# Patient Record
Sex: FEMALE | Race: WHITE | NOT HISPANIC OR LATINO | Employment: OTHER | URBAN - METROPOLITAN AREA
[De-identification: names, ages, dates, MRNs, and addresses within clinical notes are randomized per-mention and may not be internally consistent; named-entity substitution may affect disease eponyms.]

---

## 2017-02-03 ENCOUNTER — OFFICE VISIT (OUTPATIENT)
Dept: INTERNAL MEDICINE | Facility: CLINIC | Age: 82
End: 2017-02-03

## 2017-02-03 VITALS
BODY MASS INDEX: 26.5 KG/M2 | OXYGEN SATURATION: 98 % | HEIGHT: 63 IN | HEART RATE: 77 BPM | WEIGHT: 149.56 LBS | SYSTOLIC BLOOD PRESSURE: 122 MMHG | DIASTOLIC BLOOD PRESSURE: 70 MMHG | TEMPERATURE: 98.4 F

## 2017-02-03 DIAGNOSIS — R91.1 LUNG NODULE: Primary | ICD-10-CM

## 2017-02-03 DIAGNOSIS — J40 BRONCHITIS: ICD-10-CM

## 2017-02-03 DIAGNOSIS — Z00.00 MEDICARE ANNUAL WELLNESS VISIT, INITIAL: ICD-10-CM

## 2017-02-03 PROCEDURE — G0438 PPPS, INITIAL VISIT: HCPCS | Performed by: NURSE PRACTITIONER

## 2017-02-03 PROCEDURE — 99213 OFFICE O/P EST LOW 20 MIN: CPT | Performed by: NURSE PRACTITIONER

## 2017-02-03 RX ORDER — BENZONATATE 200 MG/1
200 CAPSULE ORAL 3 TIMES DAILY PRN
Qty: 30 CAPSULE | Refills: 0 | Status: SHIPPED | OUTPATIENT
Start: 2017-02-03 | End: 2017-04-05

## 2017-02-03 NOTE — PROGRESS NOTES
QUICK REFERENCE INFORMATION:  The ABCs of the Annual Wellness Visit    Initial Medicare Wellness Visit    HEALTH RISK ASSESSMENT    Recent Hospitalizations:  No recent hospitalization(s)..        Current Medical Providers:  Patient Care Team:  ED Mcgee as PCP - General (Family Medicine)  ED Mcgee as PCP - Internal Medicine (Internal Medicine)        Smoking Status:  History   Smoking Status   • Former Smoker   Smokeless Tobacco   • Never Used     Comment: years ago - too many years to remember       Alcohol Consumption:  History   Alcohol Use No       Depression Screen:   PHQ-9 Depression Screening 2/3/2017   Little interest or pleasure in doing things 0   Feeling down, depressed, or hopeless 2   Trouble falling or staying asleep, or sleeping too much 0   Feeling tired or having little energy 1   Poor appetite or overeating 0   Feeling bad about yourself - or that you are a failure or have let yourself or your family down 0   Trouble concentrating on things, such as reading the newspaper or watching television 0   Moving or speaking so slowly that other people could have noticed. Or the opposite - being so fidgety or restless that you have been moving around a lot more than usual 0   Thoughts that you would be better off dead, or of hurting yourself in some way 0   PHQ-9 Total Score 3   If you checked off any problems, how difficult have these problems made it for you to do your work, take care of things at home, or get along with other people? Somewhat difficult       Health Habits and Functional and Cognitive Screening:  Functional & Cognitive Status 2/3/2017   Do you have difficulty preparing food and eating? No   Do you have difficulty bathing yourself? No   Do you have difficulty getting dressed? No   Do you have difficulty using the toilet? No   Do you have difficulty moving around from place to place? No   In the past year have you fallen or experienced a near fall? No   Do you need help  using the phone?  No   Are you deaf or do you have serious difficulty hearing?  No   Do you need help with transportation? No   Do you need help shopping? No   Do you need help preparing meals?  No   Do you need help with housework?  No   Do you need help with laundry? No   Do you need help taking your medications? No   Do you need help managing money? No   Do you have difficulty concentrating, remembering or making decisions? No                  Does the patient have evidence of cognitive impairment? Yes    Asprin use counseling:yes    Finger Rub Hearing Test (right ear):passed  Finger Rub Hearing Test (left ear):passed    Recent Lab Results:    Visual Acuity:  No exam data present    Age-appropriate Screening Schedule:  Refer to the list below for future screening recommendations based on patient's age, sex and/or medical conditions. Orders for these recommended tests are listed in the plan section. The patient has been provided with a written plan.    Health Maintenance   Topic Date Due   • TDAP/TD VACCINES (1 - Tdap) 11/27/1954   • ZOSTER VACCINE  02/19/2016   • PNEUMOCOCCAL VACCINES (65+ LOW/MEDIUM RISK) (2 of 2 - PPSV23) 04/12/2017   • LIPID PANEL  10/14/2017   • MAMMOGRAM  12/19/2017   • COLONOSCOPY  03/16/2111   • INFLUENZA VACCINE  Completed        Subjective   History of Present Illness    Isabel Handy is a 81 y.o. female who presents for an Annual Wellness Visit. In addition, we addressed the following health issues:lung nodule, see attached note    The following portions of the patient's history were reviewed and updated as appropriate: allergies, current medications, past family history, past medical history, past social history, past surgical history and problem list.    Outpatient Medications Prior to Visit   Medication Sig Dispense Refill   • amitriptyline (ELAVIL) 25 MG tablet Take 1 tablet by mouth every night. 30 tablet 5   • aspirin 81 MG tablet Take 1 tablet by mouth daily.     •  atorvastatin (LIPITOR) 20 MG tablet Take 1 tablet by mouth Daily. 90 tablet 2   • estradiol (ESTRACE) 0.5 MG tablet Take 1/2 tablet daily 45 tablet 2   • levothyroxine (SYNTHROID, LEVOTHROID) 75 MCG tablet Take 1 tablet by mouth Daily. 90 tablet 2   • lisinopril (PRINIVIL,ZESTRIL) 10 MG tablet TAKE 1 TABLET BY MOUTH DAILY. 90 tablet 2   • meloxicam (MOBIC) 7.5 MG tablet Take 1 tablet by mouth daily as needed for mild pain (1-3). Take with food 30 tablet 3   • MULTIPLE VITAMINS PO Take 1 tablet by mouth daily.     • ondansetron (ZOFRAN) 4 MG tablet Take 4 mg by mouth Every 8 (Eight) Hours.     • Probiotic Product (SOLUBLE FIBER/PROBIOTICS) chewable tablet Chew 1 each Daily.     • promethazine (PHENERGAN) 25 MG tablet TAKE 1 TABLET BY MOUTH EVERY SIX HOURS AS NEEDED FOR 30 DAYS  5   • tobramycin-dexamethasone (TOBRADEX) ophthalmic ointment Apply 1 application to eye as needed.     • esomeprazole (NexIUM) 20 MG capsule Take 20 mg by mouth every morning before breakfast.     • clotrimazole (MYCELEX) 10 MG yonatan Take 1 tablet by mouth 5 (Five) Times a Day. 70 tablet 0   • oseltamivir (TAMIFLU) 75 MG capsule Take 1 capsule by mouth 2 (Two) Times a Day for 5 days. 10 capsule 0     No facility-administered medications prior to visit.        Patient Active Problem List   Diagnosis   • Gastroesophageal reflux disease   • Menopausal syndrome   • Osteoarthritis of cervical spine   • Carotid atherosclerosis   • Prediabetes   • Low back pain   • Insomnia   • Hypothyroidism   • Essential hypertension   • Hyperlipidemia   • Allergic rhinitis   • Palpitations   • Anxiety   • Vitamin D deficiency   • Lung nodule       Advanced Care Planning:  has NO advanced directive - information provided to the patient today    Identification of Risk Factors:  Risk factors include: cardiovascular risk.    Review of Systems    Compared to one year ago, the patient feels her physical health is the same.  Compared to one year ago, the patient feels  "her mental health is the same.    Objective     Physical Exam    Vitals:    02/03/17 1145   BP: 122/70   BP Location: Left arm   Patient Position: Sitting   Cuff Size: Adult   Pulse: 77   Temp: 98.4 °F (36.9 °C)   TempSrc: Oral   SpO2: 98%   Weight: 149 lb 9 oz (67.8 kg)   Height: 63\" (160 cm)       Body mass index is 26.49 kg/(m^2).  Discussed the patient's BMI with her. The BMI is above average; BMI management plan is completed.    Assessment/Plan   Patient Self-Management and Personalized Health Advice  The patient has been provided with information about: exercise and preventive services including:   · Advanced directives: has NO advanced directive - information provided to the patient today, Hepatitis B vaccine, TdaP vaccine, Zostavax vaccine (Herpes Zoster).  Prevnar 13- will get today    Visit Diagnoses:    ICD-10-CM ICD-9-CM   1. Lung nodule R91.1 793.11   2. Bronchitis J40 490       Orders Placed This Encounter   Procedures   • CT Chest With Contrast     Standing Status:   Future     Standing Expiration Date:   2/3/2018     Scheduling Instructions:      [facility]     Order Specific Question:   Reason for Exam:     Answer:   lung nodule   • Basic Metabolic Panel       Outpatient Encounter Prescriptions as of 2/3/2017   Medication Sig Dispense Refill   • amitriptyline (ELAVIL) 25 MG tablet Take 1 tablet by mouth every night. 30 tablet 5   • aspirin 81 MG tablet Take 1 tablet by mouth daily.     • atorvastatin (LIPITOR) 20 MG tablet Take 1 tablet by mouth Daily. 90 tablet 2   • estradiol (ESTRACE) 0.5 MG tablet Take 1/2 tablet daily 45 tablet 2   • levothyroxine (SYNTHROID, LEVOTHROID) 75 MCG tablet Take 1 tablet by mouth Daily. 90 tablet 2   • lisinopril (PRINIVIL,ZESTRIL) 10 MG tablet TAKE 1 TABLET BY MOUTH DAILY. 90 tablet 2   • meloxicam (MOBIC) 7.5 MG tablet Take 1 tablet by mouth daily as needed for mild pain (1-3). Take with food 30 tablet 3   • MULTIPLE VITAMINS PO Take 1 tablet by mouth daily.     • " ondansetron (ZOFRAN) 4 MG tablet Take 4 mg by mouth Every 8 (Eight) Hours.     • Probiotic Product (SOLUBLE FIBER/PROBIOTICS) chewable tablet Chew 1 each Daily.     • promethazine (PHENERGAN) 25 MG tablet TAKE 1 TABLET BY MOUTH EVERY SIX HOURS AS NEEDED FOR 30 DAYS  5   • tobramycin-dexamethasone (TOBRADEX) ophthalmic ointment Apply 1 application to eye as needed.     • benzonatate (TESSALON) 200 MG capsule Take 1 capsule by mouth 3 (Three) Times a Day As Needed for cough. 30 capsule 0   • esomeprazole (NexIUM) 20 MG capsule Take 20 mg by mouth every morning before breakfast.     • [DISCONTINUED] clotrimazole (MYCELEX) 10 MG yonatan Take 1 tablet by mouth 5 (Five) Times a Day. 70 tablet 0   • [DISCONTINUED] oseltamivir (TAMIFLU) 75 MG capsule Take 1 capsule by mouth 2 (Two) Times a Day for 5 days. 10 capsule 0     No facility-administered encounter medications on file as of 2/3/2017.        Reviewed use of high risk medication in the elderly: no  Reviewed for potential of harmful drug interactions in the elderly: no    Follow Up:  No Follow-up on file.     An After Visit Summary and PPPS with all of these plans were given to the patient.

## 2017-02-04 LAB
BUN SERPL-MCNC: 12 MG/DL (ref 8–23)
BUN/CREAT SERPL: 16 (ref 7–25)
CALCIUM SERPL-MCNC: 9.8 MG/DL (ref 8.6–10.5)
CHLORIDE SERPL-SCNC: 100 MMOL/L (ref 98–107)
CO2 SERPL-SCNC: 27.2 MMOL/L (ref 22–29)
CREAT SERPL-MCNC: 0.75 MG/DL (ref 0.57–1)
GLUCOSE SERPL-MCNC: 95 MG/DL (ref 65–99)
POTASSIUM SERPL-SCNC: 5.1 MMOL/L (ref 3.5–5.2)
SODIUM SERPL-SCNC: 139 MMOL/L (ref 136–145)

## 2017-02-06 ENCOUNTER — TELEPHONE (OUTPATIENT)
Dept: INTERNAL MEDICINE | Facility: CLINIC | Age: 82
End: 2017-02-06

## 2017-02-06 NOTE — TELEPHONE ENCOUNTER
----- Message from ED Mcgee sent at 2/6/2017  9:16 AM EST -----  Kidney function WNL, ok for CT scan  ----- Message -----     From: Interface, Reflab Results In     Sent: 2/4/2017   3:21 AM       To: ED Mcgee

## 2017-02-08 NOTE — PATIENT INSTRUCTIONS
Medicare Wellness  Personal Prevention Plan of Service     Date of Office Visit:  2017  Encounter Provider:  ED Morris  Place of Service:  [facility] INTERNAL MEDICINE  Patient Name: Isabel Handy  :  1935    As part of the Medicare Wellness portion of your visit today, we are providing you with this personalized preventive plan of services (PPPS). This plan is based upon recommendations of the [place] Preventive Services Task Force (USPSTF) and the Advisory Committee on Immunization Practices (ACIP).    This lists the preventive care services that should be considered, and provides dates of when you are due. Items listed as completed are up-to-date and do not require any further intervention.    Health Maintenance   Topic Date Due   • TDAP/TD VACCINES (1 - Tdap) 1956   • ZOSTER VACCINE  2016   • LIPID PANEL  10/14/2017   • MAMMOGRAM  2017   • MEDICARE ANNUAL WELLNESS  2018   • DXA SCAN  2018   • COLONOSCOPY  2019   • INFLUENZA VACCINE  Completed   • PNEUMOCOCCAL VACCINES (65+ LOW/MEDIUM RISK)  Completed         Isabel was seen today for annual exam and uri.    Diagnoses and all orders for this visit:    Lung nodule  -     CT Chest With Contrast; Future  -     Basic Metabolic Panel    Bronchitis  -     benzonatate (TESSALON) 200 MG capsule; Take 1 capsule by mouth 3 (Three) Times a Day As Needed for cough.        1.  Lung nodule-order CT chest with contrast, check BMP today  2.  Bronchitis-lungs clear, probably resolving bronchitis.  Recommend Mucinex OTC and will give Rx for Tessalon Perles.

## 2017-02-09 ENCOUNTER — HOSPITAL ENCOUNTER (OUTPATIENT)
Dept: CT IMAGING | Facility: HOSPITAL | Age: 82
Discharge: HOME | End: 2017-02-09
Admitting: NURSE PRACTITIONER

## 2017-02-09 DIAGNOSIS — R91.1 LUNG NODULE: ICD-10-CM

## 2017-02-09 LAB — CREAT BLDA-MCNC: 0.8 MG/DL (ref 0.6–1.3)

## 2017-02-09 PROCEDURE — 0 IOPAMIDOL 61 % SOLUTION: Performed by: PHYSICIAN ASSISTANT

## 2017-02-09 PROCEDURE — 71260 CT THORAX DX C+: CPT

## 2017-02-09 PROCEDURE — 82565 ASSAY OF CREATININE: CPT

## 2017-02-09 RX ADMIN — IOPAMIDOL 75 ML: 612 INJECTION, SOLUTION INTRAVENOUS at 15:20

## 2017-02-13 ENCOUNTER — TELEPHONE (OUTPATIENT)
Dept: INTERNAL MEDICINE | Facility: CLINIC | Age: 82
End: 2017-02-13

## 2017-02-13 DIAGNOSIS — D73.89 SPLENIC LESION: Primary | ICD-10-CM

## 2017-02-13 NOTE — TELEPHONE ENCOUNTER
----- Message from ED Mcgee sent at 2/13/2017 12:53 PM EST -----  Set pt up for MRI of abd w&w/o contrast for splenic lesion at [facility]  ----- Message -----     From: Chely, Rad Results [place] In     Sent: 2/13/2017  10:25 AM       To: ED Mcgee

## 2017-02-14 ENCOUNTER — TELEPHONE (OUTPATIENT)
Dept: INTERNAL MEDICINE | Facility: CLINIC | Age: 82
End: 2017-02-14

## 2017-02-14 RX ORDER — DIAZEPAM 2 MG/1
2 TABLET ORAL 2 TIMES DAILY PRN
Qty: 2 TABLET | Refills: 0 | OUTPATIENT
Start: 2017-02-14 | End: 2017-04-05

## 2017-02-14 NOTE — TELEPHONE ENCOUNTER
----- Message from ED Mcgee sent at 2/14/2017  3:18 PM EST -----  Regarding: FW: Patient Call  Contact: [phone]  Patient is nervous about MRI.  Okay to give Valium 2 mg by mouth ×1, 1hr prior to MRI.  Quantity #2.  No refills.  Patient needs to have someone that can drive her to and from the test.  ----- Message -----     From: Jannette Kamara     Sent: 2/14/2017   2:53 PM       To: ED Mcgee  Subject: FW: Patient Call                                 Do you know anything about the message to call 2 days prior to MRI?      ----- Message -----     From: Virginie Lindsay     Sent: 2/14/2017   2:35 PM       To: Jannette Kamara  Subject: Patient Call                                     Patient said she was told to call here two days prior to her MRI, which is scheduled for Thursday.

## 2017-02-14 NOTE — TELEPHONE ENCOUNTER
Patient is aware that Valium has been called in and she will need a  to & from MRI. Patient has rescheduled MRI for this coming Sunday.

## 2017-02-16 ENCOUNTER — APPOINTMENT (OUTPATIENT)
Dept: MRI IMAGING | Facility: HOSPITAL | Age: 82
End: 2017-02-16

## 2017-02-19 ENCOUNTER — HOSPITAL ENCOUNTER (OUTPATIENT)
Dept: MRI IMAGING | Facility: HOSPITAL | Age: 82
Discharge: HOME | End: 2017-02-19
Admitting: NURSE PRACTITIONER

## 2017-02-19 PROCEDURE — A9577 INJ MULTIHANCE: HCPCS | Performed by: NURSE PRACTITIONER

## 2017-02-19 PROCEDURE — 0 GADOBENATE DIMEGLUMINE 529 MG/ML SOLUTION: Performed by: NURSE PRACTITIONER

## 2017-02-19 PROCEDURE — 74183 MRI ABD W/O CNTR FLWD CNTR: CPT

## 2017-02-19 PROCEDURE — 82565 ASSAY OF CREATININE: CPT

## 2017-02-19 RX ADMIN — GADOBENATE DIMEGLUMINE 14 ML: 529 INJECTION, SOLUTION INTRAVENOUS at 09:27

## 2017-02-20 LAB — CREAT BLDA-MCNC: 0.7 MG/DL (ref 0.6–1.3)

## 2017-02-22 ENCOUNTER — TELEPHONE (OUTPATIENT)
Dept: INTERNAL MEDICINE | Facility: CLINIC | Age: 82
End: 2017-02-22

## 2017-02-22 NOTE — TELEPHONE ENCOUNTER
----- Message from Reshma Ivory sent at 2/21/2017  3:50 PM EST -----  Regarding: test result questions  Contact: [phone]  She wants someone to call her regarding the cyst that she has.

## 2017-02-22 NOTE — TELEPHONE ENCOUNTER
I CALLED PT. AND SHE STATES THAT SHE WANTS TO KNOW MORE ABOUT HER LUNG NODULE,  WHAT CAUSED HER CYST ON HER SPLEEN AND WHAT DOES SHE NEED TO DO ABOUT IT, IF SHE SHOULD BE CONCERNED ABOUT THESE RESULTS OF THE CT AND MRI OR NOT?  DOES SHE NEED A FOLLOW UP SOONER THAN April?  I SPOKE TO PT. IN DETAIL AND READ THE RESULTS TO HER EXPLAINING THE CT SHOWED CALCIFICATIONS (2) AND OTHERWISE UNREMARKABLE AND THE MRI WITH THE CYST THAT WAS ALSO UNREMARKABLE. PT. ASKED FOR A COPY TO BE MAILED OF EACH TEST RESULT.   PT. STATES THAT SHE FEELS BETTER NOW AFTER TALKING WITH ME ABOUT THE RESULTS AND JUST WANTED ZAHEER TO DOUBLE CHECK THE RESULTS AND TELL HER IF SHE NEEDS TO BE WORRIED ABOUT ANYTHING ELSE.

## 2017-02-24 NOTE — TELEPHONE ENCOUNTER
Both areas appear benign, she does not need follow up any sooner than April. No further work up needed.

## 2017-03-31 DIAGNOSIS — E55.9 VITAMIN D DEFICIENCY: ICD-10-CM

## 2017-03-31 DIAGNOSIS — M47.812 OSTEOARTHRITIS OF CERVICAL SPINE, UNSPECIFIED SPINAL OSTEOARTHRITIS COMPLICATION STATUS: ICD-10-CM

## 2017-03-31 DIAGNOSIS — R73.03 PREDIABETES: Primary | ICD-10-CM

## 2017-03-31 DIAGNOSIS — R73.9 HYPERGLYCEMIA: ICD-10-CM

## 2017-03-31 DIAGNOSIS — I10 ESSENTIAL HYPERTENSION: ICD-10-CM

## 2017-03-31 DIAGNOSIS — E03.9 HYPOTHYROIDISM, UNSPECIFIED TYPE: ICD-10-CM

## 2017-03-31 DIAGNOSIS — E78.5 HYPERLIPIDEMIA, UNSPECIFIED HYPERLIPIDEMIA TYPE: ICD-10-CM

## 2017-04-03 LAB
25(OH)D3+25(OH)D2 SERPL-MCNC: 39 NG/ML (ref 30–100)
ALBUMIN SERPL-MCNC: 4.4 G/DL (ref 3.5–5.2)
ALBUMIN/GLOB SERPL: 1.8 G/DL
ALP SERPL-CCNC: 75 U/L (ref 39–117)
ALT SERPL-CCNC: 16 U/L (ref 1–33)
AST SERPL-CCNC: 19 U/L (ref 1–32)
BASOPHILS # BLD AUTO: 0.03 10*3/MM3 (ref 0–0.2)
BASOPHILS NFR BLD AUTO: 0.4 % (ref 0–1.5)
BILIRUB SERPL-MCNC: 0.5 MG/DL (ref 0.1–1.2)
BUN SERPL-MCNC: 15 MG/DL (ref 8–23)
BUN/CREAT SERPL: 18.8 (ref 7–25)
CALCIUM SERPL-MCNC: 10.1 MG/DL (ref 8.6–10.5)
CHLORIDE SERPL-SCNC: 99 MMOL/L (ref 98–107)
CHOLEST SERPL-MCNC: 181 MG/DL (ref 0–200)
CO2 SERPL-SCNC: 28.6 MMOL/L (ref 22–29)
CREAT SERPL-MCNC: 0.8 MG/DL (ref 0.57–1)
EOSINOPHIL # BLD AUTO: 0.24 10*3/MM3 (ref 0–0.7)
EOSINOPHIL NFR BLD AUTO: 2.9 % (ref 0.3–6.2)
ERYTHROCYTE [DISTWIDTH] IN BLOOD BY AUTOMATED COUNT: 14.3 % (ref 11.7–13)
GLOBULIN SER CALC-MCNC: 2.5 GM/DL
GLUCOSE SERPL-MCNC: 100 MG/DL (ref 65–99)
HBA1C MFR BLD: 5.24 % (ref 4.8–5.6)
HCT VFR BLD AUTO: 48.3 % (ref 35.6–45.5)
HDLC SERPL-MCNC: 97 MG/DL (ref 40–60)
HGB BLD-MCNC: 15 G/DL (ref 11.9–15.5)
IMM GRANULOCYTES # BLD: 0 10*3/MM3 (ref 0–0.03)
IMM GRANULOCYTES NFR BLD: 0 % (ref 0–0.5)
LDLC SERPL CALC-MCNC: 68 MG/DL (ref 0–100)
LDLC/HDLC SERPL: 0.7 {RATIO}
LYMPHOCYTES # BLD AUTO: 3.05 10*3/MM3 (ref 0.9–4.8)
LYMPHOCYTES NFR BLD AUTO: 36.7 % (ref 19.6–45.3)
MCH RBC QN AUTO: 29.7 PG (ref 26.9–32)
MCHC RBC AUTO-ENTMCNC: 31.1 G/DL (ref 32.4–36.3)
MCV RBC AUTO: 95.6 FL (ref 80.5–98.2)
MONOCYTES # BLD AUTO: 0.61 10*3/MM3 (ref 0.2–1.2)
MONOCYTES NFR BLD AUTO: 7.3 % (ref 5–12)
NEUTROPHILS # BLD AUTO: 4.37 10*3/MM3 (ref 1.9–8.1)
NEUTROPHILS NFR BLD AUTO: 52.7 % (ref 42.7–76)
PLATELET # BLD AUTO: 288 10*3/MM3 (ref 140–500)
POTASSIUM SERPL-SCNC: 4.9 MMOL/L (ref 3.5–5.2)
PROT SERPL-MCNC: 6.9 G/DL (ref 6–8.5)
RBC # BLD AUTO: 5.05 10*6/MM3 (ref 3.9–5.2)
SODIUM SERPL-SCNC: 139 MMOL/L (ref 136–145)
TRIGL SERPL-MCNC: 79 MG/DL (ref 0–150)
TSH SERPL DL<=0.005 MIU/L-ACNC: 1.31 MIU/ML (ref 0.27–4.2)
VLDLC SERPL CALC-MCNC: 15.8 MG/DL (ref 5–40)
WBC # BLD AUTO: 8.3 10*3/MM3 (ref 4.5–10.7)

## 2017-04-04 PROBLEM — R73.9 HYPERGLYCEMIA: Status: ACTIVE | Noted: 2017-04-04

## 2017-04-05 ENCOUNTER — OFFICE VISIT (OUTPATIENT)
Dept: INTERNAL MEDICINE | Facility: CLINIC | Age: 82
End: 2017-04-05

## 2017-04-05 VITALS
HEART RATE: 81 BPM | WEIGHT: 152.5 LBS | BODY MASS INDEX: 27.02 KG/M2 | HEIGHT: 63 IN | SYSTOLIC BLOOD PRESSURE: 120 MMHG | OXYGEN SATURATION: 98 % | DIASTOLIC BLOOD PRESSURE: 68 MMHG

## 2017-04-05 DIAGNOSIS — I65.29 CAROTID ATHEROSCLEROSIS, UNSPECIFIED LATERALITY: ICD-10-CM

## 2017-04-05 DIAGNOSIS — E78.5 HYPERLIPIDEMIA, UNSPECIFIED HYPERLIPIDEMIA TYPE: Primary | ICD-10-CM

## 2017-04-05 DIAGNOSIS — I10 ESSENTIAL HYPERTENSION: ICD-10-CM

## 2017-04-05 DIAGNOSIS — E03.9 HYPOTHYROIDISM, UNSPECIFIED TYPE: ICD-10-CM

## 2017-04-05 PROCEDURE — 99214 OFFICE O/P EST MOD 30 MIN: CPT | Performed by: NURSE PRACTITIONER

## 2017-04-05 NOTE — PROGRESS NOTES
Subjective   Isabel Handy is a 81 y.o. female.     History of Present Illness   The patient is here today to F/U on lab work. Feeling well.     HTN- BP at home running syst 100-120s, no hypotensive symptoms.   HPL- At last visit Lipitor increased to 20 mg daily.  Hypothyroidism- Pt is doing well with current medication regimen, denies adverse reactions, compliant with medication schedule.     Moving from [place] farm to an apartment in [place].   The following portions of the patient's history were reviewed and updated as appropriate: allergies, current medications, past family history, past medical history, past social history, past surgical history and problem list.    Review of Systems   Constitutional: Negative.    Respiratory: Negative.    Cardiovascular: Negative.    Psychiatric/Behavioral: Negative.        Objective   Physical Exam   Constitutional: She appears well-developed and well-nourished.   Neck: Normal range of motion. Neck supple. No thyromegaly present.   Cardiovascular: Normal rate, regular rhythm, normal heart sounds and intact distal pulses.    Pulmonary/Chest: Effort normal and breath sounds normal.   Skin: Skin is warm and dry.   Psychiatric: She has a normal mood and affect. Her behavior is normal. Judgment and thought content normal.       Assessment/Plan   There are no diagnoses linked to this encounter.    1. HPL- continue lipitor 20 mg daily  2. Hypothryoidism- continue levo at  3. HTN- pt will monitor her BP and call for syst <110.  3. Carotid atherosclerosis- monitored per cardiology, to see back 5/2018    Zostavax- recommended

## 2017-04-11 ENCOUNTER — TELEPHONE (OUTPATIENT)
Dept: INTERNAL MEDICINE | Facility: CLINIC | Age: 82
End: 2017-04-11

## 2017-04-11 RX ORDER — NITROFURANTOIN 25; 75 MG/1; MG/1
100 CAPSULE ORAL 2 TIMES DAILY
Qty: 10 CAPSULE | Refills: 0 | Status: SHIPPED | OUTPATIENT
Start: 2017-04-11 | End: 2017-04-16

## 2017-04-11 NOTE — TELEPHONE ENCOUNTER
----- Message from ED Mcgee sent at 4/11/2017 12:34 PM EDT -----  Regarding: FW: Patient Call  Contact: [phone]  Strongly recommend she leave us a urine sample. Will give nitrofurantoin  mg PO BID X 5 days.   ----- Message -----     From: Perlita Mathew     Sent: 4/11/2017   9:14 AM       To: ED Mcgee  Subject: FW: Patient Call                                     ----- Message -----     From: Ne Treviño     Sent: 4/11/2017   8:14 AM       To: Perlita Mathew  Subject: Patient Call                                     Patient asked if Beth could call something in for a UTI.  I told her typically we would have a patient come in to be seen by the provider and run a urine specimen, but she stated she is moving and doesn't have time for all that.  I told her I would send a message back and ask.  She can either be reached at the above number, or her cell at   [phone].

## 2017-04-11 NOTE — TELEPHONE ENCOUNTER
I LEFT MESSAGE ON PT.'S VM EXPLAINING WE WOULD LIKE TO HAVE HER COME IN AND LEAVE A SAMPLE OF HER URINE AND THAT WE ARE SENDING IN A SCRIPT FOR MACROBID AND TO CALL US IF HER S/S DO NOT STOP AFTER SHE TAKES IT.

## 2017-04-11 NOTE — TELEPHONE ENCOUNTER
----- Message from Ne Treviño sent at 4/11/2017  8:14 AM EDT -----  Regarding: Patient Call  Contact: [phone]  Patient asked if Beth could call something in for a UTI.  I told her typically we would have a patient come in to be seen by the provider and run a urine specimen, but she stated she is moving and doesn't have time for all that.  I told her I would send a message back and ask.  She can either be reached at the above number, or her cell at   [phone].

## 2017-07-18 DIAGNOSIS — E78.5 HYPERLIPIDEMIA, UNSPECIFIED HYPERLIPIDEMIA TYPE: ICD-10-CM

## 2017-07-18 RX ORDER — ATORVASTATIN CALCIUM 20 MG/1
TABLET, FILM COATED ORAL
Qty: 90 TABLET | Refills: 2 | Status: SHIPPED | OUTPATIENT
Start: 2017-07-18 | End: 2017-10-19 | Stop reason: SDUPTHER

## 2017-08-23 ENCOUNTER — OFFICE (OUTPATIENT)
Dept: URBAN - METROPOLITAN AREA OTHER 6 | Facility: OTHER | Age: 82
End: 2017-08-23

## 2017-08-23 VITALS
HEIGHT: 63 IN | HEART RATE: 88 BPM | WEIGHT: 152 LBS | DIASTOLIC BLOOD PRESSURE: 70 MMHG | SYSTOLIC BLOOD PRESSURE: 122 MMHG

## 2017-08-23 DIAGNOSIS — K59.00 CONSTIPATION, UNSPECIFIED: ICD-10-CM

## 2017-08-23 DIAGNOSIS — K21.9 GASTRO-ESOPHAGEAL REFLUX DISEASE WITHOUT ESOPHAGITIS: ICD-10-CM

## 2017-08-23 PROCEDURE — 99212 OFFICE O/P EST SF 10 MIN: CPT | Performed by: INTERNAL MEDICINE

## 2017-10-05 ENCOUNTER — RESULTS ENCOUNTER (OUTPATIENT)
Dept: INTERNAL MEDICINE | Facility: CLINIC | Age: 82
End: 2017-10-05

## 2017-10-05 DIAGNOSIS — I10 ESSENTIAL HYPERTENSION: ICD-10-CM

## 2017-10-05 DIAGNOSIS — E03.9 HYPOTHYROIDISM, UNSPECIFIED TYPE: ICD-10-CM

## 2017-10-05 DIAGNOSIS — E78.5 HYPERLIPIDEMIA, UNSPECIFIED HYPERLIPIDEMIA TYPE: ICD-10-CM

## 2017-10-05 DIAGNOSIS — I65.29 CAROTID ATHEROSCLEROSIS, UNSPECIFIED LATERALITY: ICD-10-CM

## 2017-10-05 LAB
ALBUMIN SERPL-MCNC: 4.3 G/DL (ref 3.5–5.2)
ALBUMIN/GLOB SERPL: 1.9 G/DL
ALP SERPL-CCNC: 73 U/L (ref 39–117)
ALT SERPL-CCNC: 13 U/L (ref 1–33)
AST SERPL-CCNC: 16 U/L (ref 1–32)
BILIRUB SERPL-MCNC: 0.7 MG/DL (ref 0.1–1.2)
BUN SERPL-MCNC: 15 MG/DL (ref 8–23)
BUN/CREAT SERPL: 20.3 (ref 7–25)
CALCIUM SERPL-MCNC: 9.7 MG/DL (ref 8.6–10.5)
CHLORIDE SERPL-SCNC: 100 MMOL/L (ref 98–107)
CHOLEST SERPL-MCNC: 178 MG/DL (ref 0–200)
CO2 SERPL-SCNC: 22.6 MMOL/L (ref 22–29)
CREAT SERPL-MCNC: 0.74 MG/DL (ref 0.57–1)
GLOBULIN SER CALC-MCNC: 2.3 GM/DL
GLUCOSE SERPL-MCNC: 100 MG/DL (ref 65–99)
HDLC SERPL-MCNC: 95 MG/DL (ref 40–60)
LDLC SERPL CALC-MCNC: 67 MG/DL (ref 0–100)
LDLC/HDLC SERPL: 0.71 {RATIO}
POTASSIUM SERPL-SCNC: 4.8 MMOL/L (ref 3.5–5.2)
PROT SERPL-MCNC: 6.6 G/DL (ref 6–8.5)
SODIUM SERPL-SCNC: 138 MMOL/L (ref 136–145)
TRIGL SERPL-MCNC: 79 MG/DL (ref 0–150)
TSH SERPL DL<=0.005 MIU/L-ACNC: 0.28 MIU/ML (ref 0.27–4.2)
VLDLC SERPL CALC-MCNC: 15.8 MG/DL (ref 5–40)

## 2017-10-19 ENCOUNTER — OFFICE VISIT (OUTPATIENT)
Dept: INTERNAL MEDICINE | Facility: CLINIC | Age: 82
End: 2017-10-19

## 2017-10-19 VITALS
BODY MASS INDEX: 27.46 KG/M2 | HEART RATE: 77 BPM | DIASTOLIC BLOOD PRESSURE: 76 MMHG | HEIGHT: 63 IN | SYSTOLIC BLOOD PRESSURE: 134 MMHG | OXYGEN SATURATION: 98 % | WEIGHT: 155 LBS

## 2017-10-19 DIAGNOSIS — R11.0 NAUSEA: ICD-10-CM

## 2017-10-19 DIAGNOSIS — M47.812 OSTEOARTHRITIS OF CERVICAL SPINE, UNSPECIFIED SPINAL OSTEOARTHRITIS COMPLICATION STATUS: ICD-10-CM

## 2017-10-19 DIAGNOSIS — E03.9 HYPOTHYROIDISM, UNSPECIFIED TYPE: Primary | ICD-10-CM

## 2017-10-19 DIAGNOSIS — K21.9 GASTROESOPHAGEAL REFLUX DISEASE, ESOPHAGITIS PRESENCE NOT SPECIFIED: ICD-10-CM

## 2017-10-19 DIAGNOSIS — G47.00 INSOMNIA, UNSPECIFIED TYPE: ICD-10-CM

## 2017-10-19 DIAGNOSIS — N95.1 MENOPAUSAL SYNDROME: ICD-10-CM

## 2017-10-19 DIAGNOSIS — E78.5 HYPERLIPIDEMIA, UNSPECIFIED HYPERLIPIDEMIA TYPE: ICD-10-CM

## 2017-10-19 DIAGNOSIS — Z23 NEED FOR INFLUENZA VACCINATION: ICD-10-CM

## 2017-10-19 DIAGNOSIS — I10 ESSENTIAL HYPERTENSION: ICD-10-CM

## 2017-10-19 PROCEDURE — G0008 ADMIN INFLUENZA VIRUS VAC: HCPCS | Performed by: NURSE PRACTITIONER

## 2017-10-19 PROCEDURE — 99214 OFFICE O/P EST MOD 30 MIN: CPT | Performed by: NURSE PRACTITIONER

## 2017-10-19 RX ORDER — ESTRADIOL 0.5 MG/1
TABLET ORAL
Qty: 45 TABLET | Refills: 2 | Status: SHIPPED | OUTPATIENT
Start: 2017-10-19 | End: 2018-04-10 | Stop reason: SDUPTHER

## 2017-10-19 RX ORDER — ATORVASTATIN CALCIUM 20 MG/1
20 TABLET, FILM COATED ORAL NIGHTLY
Qty: 90 TABLET | Refills: 2 | Status: SHIPPED | OUTPATIENT
Start: 2017-10-19 | End: 2018-07-26 | Stop reason: SDUPTHER

## 2017-10-19 RX ORDER — LISINOPRIL 10 MG/1
10 TABLET ORAL DAILY
Qty: 90 TABLET | Refills: 2 | Status: SHIPPED | OUTPATIENT
Start: 2017-10-19 | End: 2018-09-12 | Stop reason: SDUPTHER

## 2017-10-19 RX ORDER — LEVOTHYROXINE SODIUM 0.07 MG/1
75 TABLET ORAL DAILY
Qty: 90 TABLET | Refills: 2 | Status: SHIPPED | OUTPATIENT
Start: 2017-10-19 | End: 2019-03-11

## 2017-10-19 RX ORDER — MELOXICAM 7.5 MG/1
7.5 TABLET ORAL DAILY PRN
Qty: 30 TABLET | Refills: 3 | Status: SHIPPED | OUTPATIENT
Start: 2017-10-19 | End: 2018-09-12 | Stop reason: SDUPTHER

## 2017-10-19 RX ORDER — AMITRIPTYLINE HYDROCHLORIDE 25 MG/1
25 TABLET, FILM COATED ORAL NIGHTLY
Qty: 90 TABLET | Refills: 2 | Status: SHIPPED | OUTPATIENT
Start: 2017-10-19 | End: 2018-10-25 | Stop reason: SDUPTHER

## 2017-10-19 RX ORDER — PROMETHAZINE HYDROCHLORIDE 25 MG/1
25 TABLET ORAL DAILY PRN
Qty: 30 TABLET | Refills: 3 | Status: SHIPPED | OUTPATIENT
Start: 2017-10-19 | End: 2018-04-22

## 2017-10-19 NOTE — PROGRESS NOTES
Subjective   Isabel Handy is a 81 y.o. female here for a follow up on hyperlipidemia.    History of Present Illness   The patient is here today to F/U on lab work. She is feeling well. Just had cataracts removed. Is doing well with her new apartment.   GERD- sees Dr. Be, he give her phenergan as needed for nausea.   Insomnia-Pt is doing well with current medication regimen, denies adverse reactions, compliant with medication schedule.   Osteoarthritis- needs refill on mobic, uses PRN  HPL-Pt is doing well with current medication regimen, denies adverse reactions, compliant with medication schedule.   HTN-Pt is doing well with current medication regimen, denies adverse reactions, compliant with medication schedule.   Hypothyroidism-Pt is doing well with current medication regimen, denies adverse reactions, compliant with medication schedule.       Carotid atherosclerosis- monitored per cardiology, to see back 5/2018  The following portions of the patient's history were reviewed and updated as appropriate: allergies, current medications, past family history, past medical history, past social history, past surgical history and problem list.    Review of Systems   Constitutional: Negative.    Respiratory: Negative.    Cardiovascular: Negative.    Psychiatric/Behavioral: Negative.        Objective   Physical Exam   Constitutional: She appears well-developed and well-nourished.   Neck: Normal range of motion. Neck supple. No thyromegaly present.   Cardiovascular: Normal rate, regular rhythm, normal heart sounds and intact distal pulses.    Right ankle with mild edema, left ankle trace edema   Pulmonary/Chest: Effort normal and breath sounds normal.   Skin: Skin is warm and dry.   Psychiatric: She has a normal mood and affect. Her behavior is normal. Judgment and thought content normal.     Vitals:    10/19/17 1031   BP: 134/76   Pulse: 77   SpO2: 98%       Current Outpatient Prescriptions:   •  amitriptyline  (ELAVIL) 25 MG tablet, Take 1 tablet by mouth every night., Disp: 30 tablet, Rfl: 5  •  aspirin 81 MG tablet, Take 1 tablet by mouth daily., Disp: , Rfl:   •  atorvastatin (LIPITOR) 20 MG tablet, TAKE 1 TABLET BY MOUTH DAILY., Disp: 90 tablet, Rfl: 2  •  estradiol (ESTRACE) 0.5 MG tablet, Take 1/2 tablet daily, Disp: 45 tablet, Rfl: 2  •  levothyroxine (SYNTHROID, LEVOTHROID) 75 MCG tablet, Take 1 tablet by mouth Daily., Disp: 90 tablet, Rfl: 2  •  lisinopril (PRINIVIL,ZESTRIL) 10 MG tablet, TAKE 1 TABLET BY MOUTH DAILY., Disp: 90 tablet, Rfl: 2  •  MULTIPLE VITAMINS PO, Take 1 tablet by mouth daily., Disp: , Rfl:   •  Probiotic Product (SOLUBLE FIBER/PROBIOTICS) chewable tablet, Chew 1 each Daily., Disp: , Rfl:   •  tobramycin-dexamethasone (TOBRADEX) ophthalmic ointment, Apply 1 application to eye as needed., Disp: , Rfl:   •  esomeprazole (NexIUM) 20 MG capsule, Take 20 mg by mouth every morning before breakfast., Disp: , Rfl:   •  meloxicam (MOBIC) 7.5 MG tablet, Take 1 tablet by mouth daily as needed for mild pain (1-3). Take with food, Disp: 30 tablet, Rfl: 3  •  promethazine (PHENERGAN) 25 MG tablet, TAKE 1 TABLET BY MOUTH EVERY SIX HOURS AS NEEDED FOR 30 DAYS, Disp: , Rfl: 5  This SmartLink has not been configured with any valid records.     Results Encounter on 10/05/2017   Component Date Value Ref Range Status   • Glucose 10/05/2017 100* 65 - 99 mg/dL Final   • BUN 10/05/2017 15  8 - 23 mg/dL Final   • Creatinine 10/05/2017 0.74  0.57 - 1.00 mg/dL Final   • eGFR Non  Am 10/05/2017 76  >60 mL/min/1.73 Final    Comment: The MDRD GFR formula is only valid for adults with stable  renal function between ages 18 and 70.     • eGFR  Am 10/05/2017 92  >60 mL/min/1.73 Final   • BUN/Creatinine Ratio 10/05/2017 20.3  7.0 - 25.0 Final   • Sodium 10/05/2017 138  136 - 145 mmol/L Final   • Potassium 10/05/2017 4.8  3.5 - 5.2 mmol/L Final   • Chloride 10/05/2017 100  98 - 107 mmol/L Final   • Total CO2  10/05/2017 22.6  22.0 - 29.0 mmol/L Final   • Calcium 10/05/2017 9.7  8.6 - 10.5 mg/dL Final   • Total Protein 10/05/2017 6.6  6.0 - 8.5 g/dL Final   • Albumin 10/05/2017 4.30  3.50 - 5.20 g/dL Final   • Globulin 10/05/2017 2.3  gm/dL Final   • A/G Ratio 10/05/2017 1.9  g/dL Final   • Total Bilirubin 10/05/2017 0.7  0.1 - 1.2 mg/dL Final   • Alkaline Phosphatase 10/05/2017 73  39 - 117 U/L Final   • AST (SGOT) 10/05/2017 16  1 - 32 U/L Final   • ALT (SGPT) 10/05/2017 13  1 - 33 U/L Final   • Total Cholesterol 10/05/2017 178  0 - 200 mg/dL Final   • Triglycerides 10/05/2017 79  0 - 150 mg/dL Final   • HDL Cholesterol 10/05/2017 95* 40 - 60 mg/dL Final   • VLDL Cholesterol 10/05/2017 15.8  5 - 40 mg/dL Final   • LDL Cholesterol  10/05/2017 67  0 - 100 mg/dL Final   • LDL/HDL Ratio 10/05/2017 0.71   Final   • TSH 10/05/2017 0.281  0.27 - 4.2 mIU/mL Final       Assessment/Plan   There are no diagnoses linked to this encounter.    1. Hypothyroidism- TSH a little low, recheck in 6 weeks if stll this low will decrease dose.   2. HPL- doing well with lipitor 20 mg daily  3. HTN- well controlled  4. GERD- will refill phenergan (she will not take and drive), zofran does not help, to see Dr. Be next year, due for C-scope next year  5. Insomnia- refill elavil  6. Osteoarthritis- refill mobic, take with food  7. Menopausal symptoms- pt is doing well with estrace, aware of risks of HRT at her age, she prefers to continue this.     Flu vaccine- 10/19/2017  Mammo in December.

## 2017-11-21 DIAGNOSIS — E03.9 HYPOTHYROIDISM, UNSPECIFIED TYPE: ICD-10-CM

## 2017-11-29 LAB — TSH SERPL DL<=0.005 MIU/L-ACNC: 0.35 MIU/ML (ref 0.27–4.2)

## 2017-12-07 DIAGNOSIS — E03.9 HYPOTHYROIDISM, UNSPECIFIED TYPE: ICD-10-CM

## 2017-12-07 RX ORDER — LEVOTHYROXINE SODIUM 0.07 MG/1
TABLET ORAL
Qty: 90 TABLET | Refills: 0 | Status: SHIPPED | OUTPATIENT
Start: 2017-12-07 | End: 2018-03-05 | Stop reason: SDUPTHER

## 2018-03-05 DIAGNOSIS — E03.9 HYPOTHYROIDISM, UNSPECIFIED TYPE: ICD-10-CM

## 2018-03-05 RX ORDER — LEVOTHYROXINE SODIUM 0.07 MG/1
TABLET ORAL
Qty: 90 TABLET | Refills: 0 | Status: SHIPPED | OUTPATIENT
Start: 2018-03-05 | End: 2018-04-05 | Stop reason: SDUPTHER

## 2018-04-10 DIAGNOSIS — N95.1 MENOPAUSAL SYNDROME: ICD-10-CM

## 2018-04-10 RX ORDER — ESTRADIOL 0.5 MG/1
TABLET ORAL
Qty: 45 TABLET | Refills: 2 | Status: SHIPPED | OUTPATIENT
Start: 2018-04-10 | End: 2018-09-05

## 2018-04-16 DIAGNOSIS — E03.4 HYPOTHYROIDISM DUE TO ACQUIRED ATROPHY OF THYROID: Primary | ICD-10-CM

## 2018-04-16 DIAGNOSIS — E03.9 HYPOTHYROIDISM, UNSPECIFIED TYPE: ICD-10-CM

## 2018-04-16 DIAGNOSIS — E78.5 HYPERLIPIDEMIA, UNSPECIFIED HYPERLIPIDEMIA TYPE: ICD-10-CM

## 2018-04-16 DIAGNOSIS — I10 ESSENTIAL HYPERTENSION: ICD-10-CM

## 2018-04-16 LAB
ALBUMIN SERPL-MCNC: 4.5 G/DL (ref 3.5–5.2)
ALBUMIN/GLOB SERPL: 1.7 G/DL
ALP SERPL-CCNC: 88 U/L (ref 39–117)
ALT SERPL-CCNC: 17 U/L (ref 1–33)
AST SERPL-CCNC: 19 U/L (ref 1–32)
BASOPHILS # BLD AUTO: 0.06 10*3/MM3 (ref 0–0.2)
BASOPHILS NFR BLD AUTO: 0.6 % (ref 0–1.5)
BILIRUB SERPL-MCNC: 0.5 MG/DL (ref 0.1–1.2)
BUN SERPL-MCNC: 14 MG/DL (ref 8–23)
BUN/CREAT SERPL: 17.1 (ref 7–25)
CALCIUM SERPL-MCNC: 9.4 MG/DL (ref 8.6–10.5)
CHLORIDE SERPL-SCNC: 101 MMOL/L (ref 98–107)
CHOLEST SERPL-MCNC: 181 MG/DL (ref 0–200)
CO2 SERPL-SCNC: 25.3 MMOL/L (ref 22–29)
CREAT SERPL-MCNC: 0.82 MG/DL (ref 0.57–1)
EOSINOPHIL # BLD AUTO: 0.46 10*3/MM3 (ref 0–0.7)
EOSINOPHIL NFR BLD AUTO: 4.4 % (ref 0.3–6.2)
ERYTHROCYTE [DISTWIDTH] IN BLOOD BY AUTOMATED COUNT: 14.2 % (ref 11.7–13)
GFR SERPLBLD CREATININE-BSD FMLA CKD-EPI: 67 ML/MIN/1.73
GFR SERPLBLD CREATININE-BSD FMLA CKD-EPI: 81 ML/MIN/1.73
GLOBULIN SER CALC-MCNC: 2.6 GM/DL
GLUCOSE SERPL-MCNC: 86 MG/DL (ref 65–99)
HCT VFR BLD AUTO: 49.9 % (ref 35.6–45.5)
HDLC SERPL-MCNC: 90 MG/DL (ref 40–60)
HGB BLD-MCNC: 15.7 G/DL (ref 11.9–15.5)
IMM GRANULOCYTES # BLD: 0.05 10*3/MM3 (ref 0–0.03)
IMM GRANULOCYTES NFR BLD: 0.5 % (ref 0–0.5)
LDLC SERPL CALC-MCNC: 78 MG/DL (ref 0–100)
LDLC/HDLC SERPL: 0.86 {RATIO}
LYMPHOCYTES # BLD AUTO: 3.45 10*3/MM3 (ref 0.9–4.8)
LYMPHOCYTES NFR BLD AUTO: 33.1 % (ref 19.6–45.3)
MCH RBC QN AUTO: 29.7 PG (ref 26.9–32)
MCHC RBC AUTO-ENTMCNC: 31.5 G/DL (ref 32.4–36.3)
MCV RBC AUTO: 94.5 FL (ref 80.5–98.2)
MONOCYTES # BLD AUTO: 0.77 10*3/MM3 (ref 0.2–1.2)
MONOCYTES NFR BLD AUTO: 7.4 % (ref 5–12)
NEUTROPHILS # BLD AUTO: 5.64 10*3/MM3 (ref 1.9–8.1)
NEUTROPHILS NFR BLD AUTO: 54 % (ref 42.7–76)
PLATELET # BLD AUTO: 297 10*3/MM3 (ref 140–500)
POTASSIUM SERPL-SCNC: 4.7 MMOL/L (ref 3.5–5.2)
PROT SERPL-MCNC: 7.1 G/DL (ref 6–8.5)
RBC # BLD AUTO: 5.28 10*6/MM3 (ref 3.9–5.2)
SODIUM SERPL-SCNC: 141 MMOL/L (ref 136–145)
TRIGL SERPL-MCNC: 66 MG/DL (ref 0–150)
TSH SERPL DL<=0.005 MIU/L-ACNC: 2.45 MIU/ML (ref 0.27–4.2)
VLDLC SERPL CALC-MCNC: 13.2 MG/DL (ref 5–40)
WBC # BLD AUTO: 10.43 10*3/MM3 (ref 4.5–10.7)

## 2018-04-18 ENCOUNTER — OFFICE VISIT (OUTPATIENT)
Dept: INTERNAL MEDICINE | Facility: CLINIC | Age: 83
End: 2018-04-18

## 2018-04-18 VITALS
DIASTOLIC BLOOD PRESSURE: 68 MMHG | SYSTOLIC BLOOD PRESSURE: 132 MMHG | OXYGEN SATURATION: 96 % | WEIGHT: 164 LBS | HEIGHT: 63 IN | HEART RATE: 73 BPM | BODY MASS INDEX: 29.06 KG/M2

## 2018-04-18 DIAGNOSIS — I10 ESSENTIAL HYPERTENSION: ICD-10-CM

## 2018-04-18 DIAGNOSIS — G47.00 INSOMNIA, UNSPECIFIED TYPE: Primary | ICD-10-CM

## 2018-04-18 DIAGNOSIS — E03.9 HYPOTHYROIDISM, UNSPECIFIED TYPE: ICD-10-CM

## 2018-04-18 DIAGNOSIS — N95.1 MENOPAUSAL SYNDROME: ICD-10-CM

## 2018-04-18 DIAGNOSIS — D58.2 ELEVATED HEMOGLOBIN (HCC): ICD-10-CM

## 2018-04-18 DIAGNOSIS — E78.5 HYPERLIPIDEMIA, UNSPECIFIED HYPERLIPIDEMIA TYPE: ICD-10-CM

## 2018-04-18 PROCEDURE — 99214 OFFICE O/P EST MOD 30 MIN: CPT | Performed by: NURSE PRACTITIONER

## 2018-04-18 NOTE — PROGRESS NOTES
Subjective   Isabel Handy is a 82 y.o. female here for a follow up on hyperlipidemia.    History of Present Illness   The patient is here today to F/U on lab work. Feeling well except with wt gain. She feels since she moved in to the city she has been less active.     Recent URI last week, better now, took keflex.     HTN-Pt is doing well with current medication regimen, denies adverse reactions, compliant with medication schedule.   Hypothyroidism- Pt is doing well with current medication regimen, denies adverse reactions, compliant with medication schedule. Taking 1/2 tab on weekends and whole tablet during the week.   GERD- taking nexium PRN  HPL- Pt is doing well with current medication regimen, denies adverse reactions, compliant with medication schedule.     The following portions of the patient's history were reviewed and updated as appropriate: allergies, current medications, past family history, past medical history, past social history, past surgical history and problem list.    Review of Systems   Constitutional: Negative.    Respiratory: Negative.    Cardiovascular: Negative.    Psychiatric/Behavioral: Positive for sleep disturbance. Negative for dysphoric mood. The patient is not nervous/anxious.        Objective   Physical Exam   Constitutional: She appears well-developed and well-nourished.   Neck: Normal range of motion. Neck supple. No thyromegaly present.   Cardiovascular: Normal rate, regular rhythm, normal heart sounds and intact distal pulses.    Pulmonary/Chest: Effort normal and breath sounds normal.   Skin: Skin is warm and dry.   Psychiatric: She has a normal mood and affect. Her behavior is normal. Judgment and thought content normal.       Assessment/Plan   There are no diagnoses linked to this encounter.    1. Insomnia- ok to take elavil at 25 mg daily  2. Menopausal symptoms- pt is doing well with estrace, aware of risks of HRT at her age, she prefers to continue this.   3. Elevated  hemoglobin- recheck in 1 month  4. HPL- continue same  5. Hypothyroidism- continue current dosing.   6. HTN- doing well, continue same

## 2018-05-18 ENCOUNTER — RESULTS ENCOUNTER (OUTPATIENT)
Dept: INTERNAL MEDICINE | Facility: CLINIC | Age: 83
End: 2018-05-18

## 2018-05-18 DIAGNOSIS — D58.2 ELEVATED HEMOGLOBIN (HCC): ICD-10-CM

## 2018-05-18 LAB
BASOPHILS # BLD AUTO: 0 X10E3/UL (ref 0–0.2)
BASOPHILS NFR BLD AUTO: 1 %
EOSINOPHIL # BLD AUTO: 0.4 X10E3/UL (ref 0–0.4)
EOSINOPHIL NFR BLD AUTO: 4 %
ERYTHROCYTE [DISTWIDTH] IN BLOOD BY AUTOMATED COUNT: 13.9 % (ref 12.3–15.4)
HCT VFR BLD AUTO: 46.6 % (ref 34–46.6)
HGB BLD-MCNC: 14.9 G/DL (ref 11.1–15.9)
IMM GRANULOCYTES # BLD: 0 X10E3/UL (ref 0–0.1)
IMM GRANULOCYTES NFR BLD: 0 %
LYMPHOCYTES # BLD AUTO: 3.6 X10E3/UL (ref 0.7–3.1)
LYMPHOCYTES NFR BLD AUTO: 41 %
MCH RBC QN AUTO: 29.2 PG (ref 26.6–33)
MCHC RBC AUTO-ENTMCNC: 32 G/DL (ref 31.5–35.7)
MCV RBC AUTO: 91 FL (ref 79–97)
MONOCYTES # BLD AUTO: 0.6 X10E3/UL (ref 0.1–0.9)
MONOCYTES NFR BLD AUTO: 7 %
NEUTROPHILS # BLD AUTO: 4.2 X10E3/UL (ref 1.4–7)
NEUTROPHILS NFR BLD AUTO: 47 %
PLATELET # BLD AUTO: 299 X10E3/UL (ref 150–379)
RBC # BLD AUTO: 5.11 X10E6/UL (ref 3.77–5.28)
WBC # BLD AUTO: 8.8 X10E3/UL (ref 3.4–10.8)

## 2018-06-04 ENCOUNTER — OFFICE VISIT (OUTPATIENT)
Dept: INTERNAL MEDICINE | Facility: CLINIC | Age: 83
End: 2018-06-04

## 2018-06-04 ENCOUNTER — HOSPITAL ENCOUNTER (OUTPATIENT)
Dept: GENERAL RADIOLOGY | Facility: HOSPITAL | Age: 83
Discharge: HOME | End: 2018-06-04
Admitting: NURSE PRACTITIONER

## 2018-06-04 VITALS
OXYGEN SATURATION: 96 % | HEART RATE: 79 BPM | TEMPERATURE: 98.4 F | BODY MASS INDEX: 29 KG/M2 | WEIGHT: 163.7 LBS | SYSTOLIC BLOOD PRESSURE: 120 MMHG | HEIGHT: 63 IN | DIASTOLIC BLOOD PRESSURE: 70 MMHG

## 2018-06-04 DIAGNOSIS — J18.9 PNEUMONIA OF RIGHT MIDDLE LOBE DUE TO INFECTIOUS ORGANISM: Primary | ICD-10-CM

## 2018-06-04 PROCEDURE — 71046 X-RAY EXAM CHEST 2 VIEWS: CPT

## 2018-06-04 PROCEDURE — 99213 OFFICE O/P EST LOW 20 MIN: CPT | Performed by: NURSE PRACTITIONER

## 2018-06-04 RX ORDER — CEFIXIME 400 MG/1
400 CAPSULE ORAL DAILY
COMMUNITY
End: 2018-06-11

## 2018-06-04 RX ORDER — DOXYCYCLINE HYCLATE 100 MG/1
100 TABLET, DELAYED RELEASE ORAL 2 TIMES DAILY
Qty: 14 TABLET | Refills: 0 | Status: SHIPPED | OUTPATIENT
Start: 2018-06-04 | End: 2018-06-11

## 2018-06-04 RX ORDER — ALBUTEROL SULFATE 90 UG/1
2 AEROSOL, METERED RESPIRATORY (INHALATION) EVERY 6 HOURS PRN
Refills: 0 | COMMUNITY
Start: 2018-06-01 | End: 2018-09-05

## 2018-06-04 NOTE — PROGRESS NOTES
Subjective   Isabel Handy is a 82 y.o. female here for cough and chest congestion. Patient went to urgent care and was diagnosed with pneumonia.    History of Present Illness   The patient is here today with complaints of cough and chest congestion.    Was seen at urgent care May 24 for sore throat, diagnosis acute pharyngitis, given Rx for amoxicillin, prednisone and cough syrup. Was then seen at [facility] [place] 6/1st with complaints of cough and chest congestion, diagnosis community-acquired pneumonia of right middle lobe of lung. Amoxil was discontinued and suprax was started, she is taking one daily for 10 days.   She is coughing up grey sputum. She was given an inhaler but doesn't feel a lot of relief. Yesterday 100.8. She did get a steroid injection on 6/1st.   She is taking probiotic daily.   The following portions of the patient's history were reviewed and updated as appropriate: allergies, current medications, past family history, past medical history, past social history, past surgical history and problem list.    Review of Systems   Constitutional: Positive for fever.   HENT: Negative.    Respiratory: Positive for cough, shortness of breath (intermittently) and wheezing.    Cardiovascular: Negative.        Objective   Physical Exam   Constitutional: She appears well-developed and well-nourished.   Neck: Normal range of motion. Neck supple. No thyromegaly present.   Cardiovascular: Normal rate, regular rhythm, normal heart sounds and intact distal pulses.    Pulmonary/Chest: Effort normal. She has wheezes in the left upper field, the left middle field and the left lower field. She has rhonchi in the right upper field, the right middle field and the right lower field. She has rales in the right upper field, the right middle field and the right lower field.   Skin: Skin is warm and dry.   Psychiatric: She has a normal mood and affect. Her behavior is normal. Judgment and thought content normal.       Assessment/Plan    There are no diagnoses linked to this encounter.    1. Pneumonia- continue the mucinex albuterol inhaler. Change from suprax to doxycyline X7 days. Hydrate well. If symptoms worsen go to the ER. Return in 1 week for recheck. Needs CXR in 6 weeks. Rest, vit C. Continue probiotic, notify for any diarrhea.

## 2018-06-11 ENCOUNTER — OFFICE VISIT (OUTPATIENT)
Dept: INTERNAL MEDICINE | Facility: CLINIC | Age: 83
End: 2018-06-11

## 2018-06-11 VITALS
TEMPERATURE: 97.9 F | HEART RATE: 74 BPM | SYSTOLIC BLOOD PRESSURE: 126 MMHG | BODY MASS INDEX: 28.92 KG/M2 | OXYGEN SATURATION: 97 % | WEIGHT: 163.2 LBS | HEIGHT: 63 IN | DIASTOLIC BLOOD PRESSURE: 72 MMHG

## 2018-06-11 DIAGNOSIS — J40 BRONCHITIS: Primary | ICD-10-CM

## 2018-06-11 PROCEDURE — 99213 OFFICE O/P EST LOW 20 MIN: CPT | Performed by: NURSE PRACTITIONER

## 2018-06-11 NOTE — PROGRESS NOTES
Subjective   Isabel Handy is a 82 y.o. female here for a follow up on pneumonia and cough.    History of Present Illness   The patient is here today to F/U on pneumonia. [place] [facility] [place] dx with RML pneumonia. CXR actually just shows a calcified granuloma, no pneumonia. Pt was treated with suprax and doxcycyline.     She does report that she is feeling better. Productive cough is now clear, no fevers or chills.   The following portions of the patient's history were reviewed and updated as appropriate: allergies, current medications, past family history, past medical history, past social history, past surgical history and problem list.    Review of Systems   Constitutional: Negative.    Respiratory: Positive for cough. Negative for shortness of breath and wheezing.    Cardiovascular: Negative.    Psychiatric/Behavioral: Negative.        Objective   Physical Exam   Constitutional: She appears well-developed and well-nourished.   Neck: Normal range of motion. Neck supple. No thyromegaly present.   Cardiovascular: Normal rate, regular rhythm, normal heart sounds and intact distal pulses.    Pulmonary/Chest: Effort normal and breath sounds normal.   Skin: Skin is warm and dry.   Psychiatric: She has a normal mood and affect. Her behavior is normal. Judgment and thought content normal.       Assessment/Plan   There are no diagnoses linked to this encounter.    1. Bronchitis- resolving, continue same

## 2018-06-28 DIAGNOSIS — E03.9 HYPOTHYROIDISM, UNSPECIFIED TYPE: ICD-10-CM

## 2018-06-28 RX ORDER — LEVOTHYROXINE SODIUM 0.07 MG/1
TABLET ORAL
Qty: 90 TABLET | Refills: 0 | Status: SHIPPED | OUTPATIENT
Start: 2018-06-28 | End: 2018-08-24

## 2018-07-26 DIAGNOSIS — E78.5 HYPERLIPIDEMIA, UNSPECIFIED HYPERLIPIDEMIA TYPE: ICD-10-CM

## 2018-07-26 RX ORDER — ATORVASTATIN CALCIUM 20 MG/1
20 TABLET, FILM COATED ORAL NIGHTLY
Qty: 90 TABLET | Refills: 0 | Status: SHIPPED | OUTPATIENT
Start: 2018-07-26 | End: 2018-09-05

## 2018-08-22 ENCOUNTER — OFFICE (OUTPATIENT)
Dept: URBAN - METROPOLITAN AREA CLINIC 66 | Facility: CLINIC | Age: 83
End: 2018-08-22

## 2018-08-22 VITALS
HEIGHT: 63 IN | WEIGHT: 165 LBS | SYSTOLIC BLOOD PRESSURE: 120 MMHG | HEART RATE: 88 BPM | DIASTOLIC BLOOD PRESSURE: 84 MMHG

## 2018-08-22 DIAGNOSIS — Z80.9 FAMILY HISTORY OF MALIGNANT NEOPLASM, UNSPECIFIED: ICD-10-CM

## 2018-08-22 DIAGNOSIS — R13.10 DYSPHAGIA, UNSPECIFIED: ICD-10-CM

## 2018-08-22 DIAGNOSIS — K21.9 GASTRO-ESOPHAGEAL REFLUX DISEASE WITHOUT ESOPHAGITIS: ICD-10-CM

## 2018-08-22 PROCEDURE — 99213 OFFICE O/P EST LOW 20 MIN: CPT | Performed by: INTERNAL MEDICINE

## 2018-08-24 ENCOUNTER — HOSPITAL ENCOUNTER (EMERGENCY)
Facility: HOSPITAL | Age: 83
Discharge: HOME | End: 2018-08-24
Attending: EMERGENCY MEDICINE | Admitting: EMERGENCY MEDICINE

## 2018-08-24 ENCOUNTER — APPOINTMENT (OUTPATIENT)
Dept: GENERAL RADIOLOGY | Facility: HOSPITAL | Age: 83
End: 2018-08-24

## 2018-08-24 VITALS
BODY MASS INDEX: 28.35 KG/M2 | RESPIRATION RATE: 18 BRPM | HEIGHT: 63 IN | DIASTOLIC BLOOD PRESSURE: 74 MMHG | OXYGEN SATURATION: 99 % | TEMPERATURE: 98.3 F | SYSTOLIC BLOOD PRESSURE: 129 MMHG | WEIGHT: 160 LBS | HEART RATE: 66 BPM

## 2018-08-24 DIAGNOSIS — R55 NEAR SYNCOPE: ICD-10-CM

## 2018-08-24 DIAGNOSIS — R42 LIGHTHEADEDNESS: Primary | ICD-10-CM

## 2018-08-24 PROCEDURE — 71046 X-RAY EXAM CHEST 2 VIEWS: CPT

## 2018-08-24 PROCEDURE — 84484 ASSAY OF TROPONIN QUANT: CPT | Performed by: PHYSICIAN ASSISTANT

## 2018-08-24 PROCEDURE — 83880 ASSAY OF NATRIURETIC PEPTIDE: CPT | Performed by: PHYSICIAN ASSISTANT

## 2018-08-24 PROCEDURE — 85379 FIBRIN DEGRADATION QUANT: CPT | Performed by: EMERGENCY MEDICINE

## 2018-08-24 PROCEDURE — 36415 COLL VENOUS BLD VENIPUNCTURE: CPT

## 2018-08-24 PROCEDURE — 80053 COMPREHEN METABOLIC PANEL: CPT | Performed by: PHYSICIAN ASSISTANT

## 2018-08-24 PROCEDURE — 99284 EMERGENCY DEPT VISIT MOD MDM: CPT

## 2018-08-24 PROCEDURE — 93010 ELECTROCARDIOGRAM REPORT: CPT | Performed by: INTERNAL MEDICINE

## 2018-08-24 PROCEDURE — 93005 ELECTROCARDIOGRAM TRACING: CPT | Performed by: EMERGENCY MEDICINE

## 2018-08-24 PROCEDURE — 93005 ELECTROCARDIOGRAM TRACING: CPT

## 2018-08-24 PROCEDURE — 85025 COMPLETE CBC W/AUTO DIFF WBC: CPT | Performed by: PHYSICIAN ASSISTANT

## 2018-08-24 RX ORDER — SODIUM CHLORIDE 0.9 % (FLUSH) 0.9 %
10 SYRINGE (ML) INJECTION AS NEEDED
Status: DISCONTINUED | OUTPATIENT
Start: 2018-08-24 | End: 2018-08-24 | Stop reason: HOSPADM

## 2018-09-05 ENCOUNTER — APPOINTMENT (OUTPATIENT)
Dept: MRI IMAGING | Facility: HOSPITAL | Age: 83
End: 2018-09-05

## 2018-09-05 ENCOUNTER — APPOINTMENT (OUTPATIENT)
Dept: CT IMAGING | Facility: HOSPITAL | Age: 83
End: 2018-09-05

## 2018-09-05 ENCOUNTER — HOSPITAL ENCOUNTER (OUTPATIENT)
Facility: HOSPITAL | Age: 83
Setting detail: OBSERVATION
Discharge: HOME | End: 2018-09-06
Attending: EMERGENCY MEDICINE | Admitting: EMERGENCY MEDICINE

## 2018-09-05 DIAGNOSIS — R51.9 ACUTE NONINTRACTABLE HEADACHE, UNSPECIFIED HEADACHE TYPE: Primary | ICD-10-CM

## 2018-09-05 DIAGNOSIS — R42 DIZZY: ICD-10-CM

## 2018-09-05 DIAGNOSIS — R00.2 PALPITATIONS: ICD-10-CM

## 2018-09-05 PROBLEM — R53.83 MALAISE AND FATIGUE: Status: ACTIVE | Noted: 2018-09-05

## 2018-09-05 PROBLEM — R53.81 MALAISE AND FATIGUE: Status: ACTIVE | Noted: 2018-09-05

## 2018-09-05 LAB
ALBUMIN SERPL-MCNC: 4.4 G/DL (ref 3.5–5.2)
ALBUMIN/GLOB SERPL: 1.8 G/DL
ALP SERPL-CCNC: 74 U/L (ref 39–117)
ALT SERPL W P-5'-P-CCNC: 17 U/L (ref 1–33)
ANION GAP SERPL CALCULATED.3IONS-SCNC: 11.9 MMOL/L
AST SERPL-CCNC: 20 U/L (ref 1–32)
BASOPHILS # BLD AUTO: 0.05 10*3/MM3 (ref 0–0.2)
BASOPHILS NFR BLD AUTO: 0.5 % (ref 0–1.5)
BILIRUB SERPL-MCNC: 0.4 MG/DL (ref 0.1–1.2)
BILIRUB UR QL STRIP: NEGATIVE
BUN BLD-MCNC: 12 MG/DL (ref 8–23)
BUN/CREAT SERPL: 13.6 (ref 7–25)
CALCIUM SPEC-SCNC: 9.4 MG/DL (ref 8.6–10.5)
CHLORIDE SERPL-SCNC: 106 MMOL/L (ref 98–107)
CLARITY UR: CLEAR
CO2 SERPL-SCNC: 24.1 MMOL/L (ref 22–29)
COLOR UR: YELLOW
CREAT BLD-MCNC: 0.88 MG/DL (ref 0.57–1)
DEPRECATED RDW RBC AUTO: 48.9 FL (ref 37–54)
EOSINOPHIL # BLD AUTO: 0.1 10*3/MM3 (ref 0–0.7)
EOSINOPHIL NFR BLD AUTO: 1 % (ref 0.3–6.2)
ERYTHROCYTE [DISTWIDTH] IN BLOOD BY AUTOMATED COUNT: 14 % (ref 11.7–13)
GFR SERPL CREATININE-BSD FRML MDRD: 62 ML/MIN/1.73
GLOBULIN UR ELPH-MCNC: 2.5 GM/DL
GLUCOSE BLD-MCNC: 93 MG/DL (ref 65–99)
GLUCOSE UR STRIP-MCNC: NEGATIVE MG/DL
HCT VFR BLD AUTO: 50.5 % (ref 35.6–45.5)
HGB BLD-MCNC: 15.6 G/DL (ref 11.9–15.5)
HGB UR QL STRIP.AUTO: NEGATIVE
IMM GRANULOCYTES # BLD: 0.02 10*3/MM3 (ref 0–0.03)
IMM GRANULOCYTES NFR BLD: 0.2 % (ref 0–0.5)
INR PPP: 1.01 (ref 0.9–1.1)
KETONES UR QL STRIP: NEGATIVE
LEUKOCYTE ESTERASE UR QL STRIP.AUTO: NEGATIVE
LIPASE SERPL-CCNC: 17 U/L (ref 13–60)
LYMPHOCYTES # BLD AUTO: 1.82 10*3/MM3 (ref 0.9–4.8)
LYMPHOCYTES NFR BLD AUTO: 19 % (ref 19.6–45.3)
MAGNESIUM SERPL-MCNC: 2.2 MG/DL (ref 1.6–2.4)
MCH RBC QN AUTO: 29.4 PG (ref 26.9–32)
MCHC RBC AUTO-ENTMCNC: 30.9 G/DL (ref 32.4–36.3)
MCV RBC AUTO: 95.1 FL (ref 80.5–98.2)
MONOCYTES # BLD AUTO: 0.61 10*3/MM3 (ref 0.2–1.2)
MONOCYTES NFR BLD AUTO: 6.4 % (ref 5–12)
NEUTROPHILS # BLD AUTO: 7 10*3/MM3 (ref 1.9–8.1)
NEUTROPHILS NFR BLD AUTO: 72.9 % (ref 42.7–76)
NITRITE UR QL STRIP: NEGATIVE
PH UR STRIP.AUTO: 7 [PH] (ref 5–8)
PLATELET # BLD AUTO: 244 10*3/MM3 (ref 140–500)
PMV BLD AUTO: 11.8 FL (ref 6–12)
POTASSIUM BLD-SCNC: 4 MMOL/L (ref 3.5–5.2)
PROT SERPL-MCNC: 6.9 G/DL (ref 6–8.5)
PROT UR QL STRIP: NEGATIVE
PROTHROMBIN TIME: 13.1 SECONDS (ref 11.7–14.2)
RBC # BLD AUTO: 5.31 10*6/MM3 (ref 3.9–5.2)
SODIUM BLD-SCNC: 142 MMOL/L (ref 136–145)
SP GR UR STRIP: <=1.005 (ref 1–1.03)
TROPONIN T SERPL-MCNC: <0.01 NG/ML (ref 0–0.03)
UROBILINOGEN UR QL STRIP: NORMAL
WBC NRBC COR # BLD: 9.6 10*3/MM3 (ref 4.5–10.7)

## 2018-09-05 PROCEDURE — 70549 MR ANGIOGRAPH NECK W/O&W/DYE: CPT

## 2018-09-05 PROCEDURE — 85610 PROTHROMBIN TIME: CPT | Performed by: EMERGENCY MEDICINE

## 2018-09-05 PROCEDURE — G0378 HOSPITAL OBSERVATION PER HR: HCPCS

## 2018-09-05 PROCEDURE — A9577 INJ MULTIHANCE: HCPCS | Performed by: INTERNAL MEDICINE

## 2018-09-05 PROCEDURE — 93005 ELECTROCARDIOGRAM TRACING: CPT | Performed by: EMERGENCY MEDICINE

## 2018-09-05 PROCEDURE — 81003 URINALYSIS AUTO W/O SCOPE: CPT | Performed by: EMERGENCY MEDICINE

## 2018-09-05 PROCEDURE — 80053 COMPREHEN METABOLIC PANEL: CPT | Performed by: EMERGENCY MEDICINE

## 2018-09-05 PROCEDURE — 83735 ASSAY OF MAGNESIUM: CPT | Performed by: EMERGENCY MEDICINE

## 2018-09-05 PROCEDURE — 99285 EMERGENCY DEPT VISIT HI MDM: CPT

## 2018-09-05 PROCEDURE — 96361 HYDRATE IV INFUSION ADD-ON: CPT

## 2018-09-05 PROCEDURE — 84484 ASSAY OF TROPONIN QUANT: CPT | Performed by: EMERGENCY MEDICINE

## 2018-09-05 PROCEDURE — 83690 ASSAY OF LIPASE: CPT | Performed by: EMERGENCY MEDICINE

## 2018-09-05 PROCEDURE — 93010 ELECTROCARDIOGRAM REPORT: CPT | Performed by: INTERNAL MEDICINE

## 2018-09-05 PROCEDURE — 25010000002 LORAZEPAM PER 2 MG: Performed by: EMERGENCY MEDICINE

## 2018-09-05 PROCEDURE — 70551 MRI BRAIN STEM W/O DYE: CPT

## 2018-09-05 PROCEDURE — 70450 CT HEAD/BRAIN W/O DYE: CPT

## 2018-09-05 PROCEDURE — 85025 COMPLETE CBC W/AUTO DIFF WBC: CPT | Performed by: EMERGENCY MEDICINE

## 2018-09-05 PROCEDURE — 70544 MR ANGIOGRAPHY HEAD W/O DYE: CPT

## 2018-09-05 PROCEDURE — 0 GADOBENATE DIMEGLUMINE 529 MG/ML SOLUTION: Performed by: INTERNAL MEDICINE

## 2018-09-05 PROCEDURE — 96374 THER/PROPH/DIAG INJ IV PUSH: CPT

## 2018-09-05 RX ORDER — LEVOTHYROXINE SODIUM 0.07 MG/1
75 TABLET ORAL
Status: DISCONTINUED | OUTPATIENT
Start: 2018-09-06 | End: 2018-09-06 | Stop reason: HOSPADM

## 2018-09-05 RX ORDER — SODIUM CHLORIDE 9 MG/ML
75 INJECTION, SOLUTION INTRAVENOUS CONTINUOUS
Status: DISCONTINUED | OUTPATIENT
Start: 2018-09-05 | End: 2018-09-06 | Stop reason: HOSPADM

## 2018-09-05 RX ORDER — NITROGLYCERIN 0.4 MG/1
0.4 TABLET SUBLINGUAL
Status: DISCONTINUED | OUTPATIENT
Start: 2018-09-05 | End: 2018-09-06 | Stop reason: HOSPADM

## 2018-09-05 RX ORDER — HYDROCODONE BITARTRATE AND ACETAMINOPHEN 5; 325 MG/1; MG/1
1 TABLET ORAL EVERY 4 HOURS PRN
Status: DISCONTINUED | OUTPATIENT
Start: 2018-09-05 | End: 2018-09-06 | Stop reason: HOSPADM

## 2018-09-05 RX ORDER — SODIUM CHLORIDE 0.9 % (FLUSH) 0.9 %
1-10 SYRINGE (ML) INJECTION AS NEEDED
Status: DISCONTINUED | OUTPATIENT
Start: 2018-09-05 | End: 2018-09-06 | Stop reason: HOSPADM

## 2018-09-05 RX ORDER — ATORVASTATIN CALCIUM 20 MG/1
20 TABLET, FILM COATED ORAL NIGHTLY
Status: DISCONTINUED | OUTPATIENT
Start: 2018-09-05 | End: 2018-09-06 | Stop reason: HOSPADM

## 2018-09-05 RX ORDER — L.ACID,PARA/B.BIFIDUM/S.THERM 8B CELL
1 CAPSULE ORAL DAILY
Status: DISCONTINUED | OUTPATIENT
Start: 2018-09-06 | End: 2018-09-06 | Stop reason: HOSPADM

## 2018-09-05 RX ORDER — MULTIPLE VITAMINS W/ MINERALS TAB 9MG-400MCG
1 TAB ORAL DAILY
COMMUNITY
End: 2022-11-17 | Stop reason: ALTCHOICE

## 2018-09-05 RX ORDER — ESTRADIOL 0.5 MG/1
0.25 TABLET ORAL DAILY
Status: ON HOLD | COMMUNITY
End: 2018-09-21

## 2018-09-05 RX ORDER — ATORVASTATIN CALCIUM 20 MG/1
20 TABLET, FILM COATED ORAL NIGHTLY
COMMUNITY
End: 2018-09-12 | Stop reason: SDUPTHER

## 2018-09-05 RX ORDER — ONDANSETRON 2 MG/ML
4 INJECTION INTRAMUSCULAR; INTRAVENOUS EVERY 6 HOURS PRN
Status: DISCONTINUED | OUTPATIENT
Start: 2018-09-05 | End: 2018-09-06 | Stop reason: HOSPADM

## 2018-09-05 RX ORDER — LISINOPRIL 10 MG/1
10 TABLET ORAL DAILY
Status: DISCONTINUED | OUTPATIENT
Start: 2018-09-06 | End: 2018-09-06 | Stop reason: HOSPADM

## 2018-09-05 RX ORDER — AMITRIPTYLINE HYDROCHLORIDE 25 MG/1
12.5 TABLET, FILM COATED ORAL NIGHTLY
Status: DISCONTINUED | OUTPATIENT
Start: 2018-09-05 | End: 2018-09-06 | Stop reason: HOSPADM

## 2018-09-05 RX ORDER — ASPIRIN 81 MG/1
81 TABLET ORAL DAILY
Status: DISCONTINUED | OUTPATIENT
Start: 2018-09-06 | End: 2018-09-06 | Stop reason: HOSPADM

## 2018-09-05 RX ORDER — ACETAMINOPHEN 325 MG/1
650 TABLET ORAL EVERY 4 HOURS PRN
Status: DISCONTINUED | OUTPATIENT
Start: 2018-09-05 | End: 2018-09-06 | Stop reason: HOSPADM

## 2018-09-05 RX ORDER — ONDANSETRON 4 MG/1
4 TABLET, FILM COATED ORAL EVERY 6 HOURS PRN
Status: DISCONTINUED | OUTPATIENT
Start: 2018-09-05 | End: 2018-09-06 | Stop reason: HOSPADM

## 2018-09-05 RX ORDER — SODIUM CHLORIDE 0.9 % (FLUSH) 0.9 %
10 SYRINGE (ML) INJECTION AS NEEDED
Status: DISCONTINUED | OUTPATIENT
Start: 2018-09-05 | End: 2018-09-06 | Stop reason: HOSPADM

## 2018-09-05 RX ORDER — PANTOPRAZOLE SODIUM 40 MG/1
40 TABLET, DELAYED RELEASE ORAL
Status: DISCONTINUED | OUTPATIENT
Start: 2018-09-06 | End: 2018-09-06 | Stop reason: HOSPADM

## 2018-09-05 RX ORDER — SENNA AND DOCUSATE SODIUM 50; 8.6 MG/1; MG/1
2 TABLET, FILM COATED ORAL 2 TIMES DAILY PRN
Status: DISCONTINUED | OUTPATIENT
Start: 2018-09-05 | End: 2018-09-06 | Stop reason: HOSPADM

## 2018-09-05 RX ORDER — ONDANSETRON 4 MG/1
4 TABLET, ORALLY DISINTEGRATING ORAL EVERY 6 HOURS PRN
Status: DISCONTINUED | OUTPATIENT
Start: 2018-09-05 | End: 2018-09-06 | Stop reason: HOSPADM

## 2018-09-05 RX ORDER — LORAZEPAM 2 MG/ML
0.5 INJECTION INTRAMUSCULAR ONCE
Status: COMPLETED | OUTPATIENT
Start: 2018-09-05 | End: 2018-09-05

## 2018-09-05 RX ADMIN — HYDROCODONE BITARTRATE AND ACETAMINOPHEN 1 TABLET: 5; 325 TABLET ORAL at 20:31

## 2018-09-05 RX ADMIN — SODIUM CHLORIDE 75 ML/HR: 9 INJECTION, SOLUTION INTRAVENOUS at 20:35

## 2018-09-05 RX ADMIN — LORAZEPAM 0.5 MG: 2 INJECTION INTRAMUSCULAR; INTRAVENOUS at 17:56

## 2018-09-05 RX ADMIN — GADOBENATE DIMEGLUMINE 15 ML: 529 INJECTION, SOLUTION INTRAVENOUS at 19:09

## 2018-09-05 NOTE — PROGRESS NOTES
Clinical Pharmacy Services: Medication History    Isabel Handy is a 82 y.o. female presenting to [facility] for   Chief Complaint   Patient presents with   • Weakness - Generalized     WITH HEAD PRESSURE AT THE BASE OF HER SKULL AND NAUSEA STARTING TODAY       She  has a past medical history of Arrhythmia; Hyperlipidemia; Hypertension; Hypothyroidism; and Subarachnoid hemorrhage (CMS/[facility]).    Allergies as of 09/05/2018 - Reviewed 09/05/2018   Allergen Reaction Noted   • Gatifloxacin Arrhythmia 03/15/2016   • Rosuvastatin Myalgia 03/15/2016       Medication information was obtained from: Patient  Pharmacy and Phone Number: [facility] [phone]    Prior to Admission Medications     Prescriptions Last Dose Informant Patient Reported? Taking?    amitriptyline (ELAVIL) 25 MG tablet  Self No Yes    Take 1 tablet by mouth Every Night.    Patient taking differently:  Take 12.5 mg by mouth Every Night.    aspirin 81 MG tablet  Self Yes Yes    Take 1 tablet by mouth daily.    atorvastatin (LIPITOR) 20 MG tablet  Self Yes Yes    Take 20 mg by mouth Every Night.    esomeprazole (NexIUM) 20 MG capsule  Self Yes Yes    Take 20 mg by mouth every morning before breakfast.    estradiol (ESTRACE) 0.5 MG tablet  Self Yes Yes    Take 0.25 mg by mouth Daily.    levothyroxine (SYNTHROID, LEVOTHROID) 75 MCG tablet  Self No Yes    Take 1 tablet by mouth Daily.    lisinopril (PRINIVIL,ZESTRIL) 10 MG tablet  Self No Yes    Take 1 tablet by mouth Daily.    meloxicam (MOBIC) 7.5 MG tablet Past Month Self No Yes    Take 1 tablet by mouth Daily As Needed for Mild Pain . Take with food    Multiple Vitamins-Minerals (MULTIVITAMIN WITH MINERALS) tablet tablet  Self Yes Yes    Take 1 tablet by mouth Daily.    Probiotic Product (SOLUBLE FIBER/PROBIOTICS) chewable tablet  Self Yes Yes    Chew 1 each Daily.            Medication notes: Removed Ventolin, patient says she was only using when she had bronchitis.    This medication list is  complete to the best of my knowledge as of 9/5/2018    Please call if questions.    Jannette Reyes, Medication History Technician  9/5/2018 6:27 PM

## 2018-09-05 NOTE — PROGRESS NOTES
Discharge Planning Assessment  [facility]     Patient Name: Isabel Handy  MRN: [id number]  Today's Date: 9/5/2018    Admit Date: 9/5/2018          Discharge Needs Assessment     Row Name 09/05/18 1933       Living Environment    Lives With alone    Current Living Arrangements home/apartment/condo    Duration at Residence 2 years    Primary Care Provided by self    Provides Primary Care For no one    Family Caregiver if Needed child(dilma), adult    Quality of Family Relationships supportive    Able to Return to Prior Arrangements yes    Living Arrangement Comments No steps outside of, or inside apartment       Resource/Environmental Concerns    Resource/Environmental Concerns none    Transportation Concerns car, none       Transition Planning    Patient/Family Anticipates Transition to home    Patient/Family Anticipated Services at Transition none    Transportation Anticipated car, drives self       Discharge Needs Assessment    Concerns to be Addressed no discharge needs identified    Equipment Currently Used at Home --   Has a walker, but does not use at present time    Anticipated Changes Related to Illness none    Equipment Needed After Discharge none    Offered/Gave Vendor List no            Discharge Plan     Row Name 09/05/18 1935       Plan    Plan Plane to return hung after d/c    Patient/Family in Agreement with Plan yes        Destination     No service coordination in this encounter.      Durable Medical Equipment     No service coordination in this encounter.      Dialysis/Infusion     No service coordination in this encounter.      Home Medical Care     No service coordination in this encounter.      Social Care     No service coordination in this encounter.                Demographic Summary    No documentation.           Functional Status    No documentation.           Psychosocial    No documentation.           Abuse/Neglect    No documentation.           Legal    No documentation.            Substance Abuse    No documentation.           Patient Forms    No documentation.         Rose Ott RN

## 2018-09-05 NOTE — ED PROVIDER NOTES
" EMERGENCY DEPARTMENT ENCOUNTER    CHIEF COMPLAINT  Chief Complaint: generalized weakness  History given by: pt and multiple family members  History limited by: none  Room Number: 29/29  PMD: Beth Figueredo APRN      HPI:  Pt is a 82 y.o. female who presents to the ED with generalized weakness since this morning 1100. Back of the pt head feels crampy and heart  palpitations.  She also felt a little dizzy and felt weak all over.  Patient had some palpitations in her chest when this was occurring as well.  Patient thought this was from low blood sugar and so she ate and drank with no resolution of her symptoms.  She had a similar episode of about 10 days ago in which she came to the emergency department but this was worse and it was not resolving.  She is not followed up with her doctor as of yet after visiting the ER.  Pt denies SOA, fever, chills and chest pain. Pt is had no new medicines and no recent infections or coughs or colds.    Duration:  Since this morning, 1100  Onset: gradual  Timing: constant  Quality: \"feels crampy\"   Intensity/Severity: Moderate  Progression: worsened  Associated Symptoms: crampy and heart palpatation    PAST MEDICAL HISTORY  Active Ambulatory Problems     Diagnosis Date Noted   • Gastroesophageal reflux disease 03/15/2016   • Menopausal syndrome 03/15/2016   • Osteoarthritis of cervical spine 03/15/2016   • Carotid atherosclerosis 03/15/2016   • Prediabetes 03/15/2016   • Low back pain 03/15/2016   • Insomnia 03/15/2016   • Hypothyroidism 03/15/2016   • Essential hypertension 03/15/2016   • Hyperlipidemia 03/15/2016   • Allergic rhinitis 03/15/2016   • Palpitations 03/15/2016   • Anxiety 04/12/2016   • Vitamin D deficiency 10/18/2016   • Lung nodule 02/03/2017   • Hyperglycemia 04/04/2017     Resolved Ambulatory Problems     Diagnosis Date Noted   • Coxitis 03/15/2016   • Hiatal hernia 03/15/2016   • Shortness of breath 03/15/2016   • 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   • Edema 05/16/2016 "     Past Medical History:   Diagnosis Date   • Arrhythmia    • Hyperlipidemia    • Hypertension    • Hypothyroidism    • Subarachnoid hemorrhage (CMS/HCC)        PAST SURGICAL HISTORY  Past Surgical History:   Procedure Laterality Date   • BLEPHAROPLASTY Bilateral    • FOOT SURGERY Bilateral    • HYSTERECTOMY         FAMILY HISTORY  Family History   Problem Relation Age of Onset   • Hypertension Mother    • Cancer Mother         Colon   • Heart disease Mother    • Arthritis Mother    • Hypertension Father    • Cancer Father         Prostate   • Heart disease Father    • COPD Sister    • Hypertension Brother    • Heart disease Brother          of heart attack (2 brothers)   • Tuberculosis Brother    • Diabetes Paternal Aunt        SOCIAL HISTORY  Social History     Social History   • Marital status:      Spouse name: N/A   • Number of children: N/A   • Years of education: N/A     Occupational History   • Not on file.     Social History Main Topics   • Smoking status: Former Smoker   • Smokeless tobacco: Never Used      Comment: years ago - too many years to remember   • Alcohol use No   • Drug use: No   • Sexual activity: Defer     Other Topics Concern   • Not on file     Social History Narrative   • No narrative on file       ALLERGIES  Gatifloxacin and Rosuvastatin    REVIEW OF SYSTEMS  Review of Systems   Constitutional: Negative for fever.        Crampy and doesn't feel well.   HENT: Negative for sore throat.    Eyes: Negative.    Respiratory: Negative for cough and shortness of breath.    Cardiovascular: Positive for palpitations. Negative for chest pain.   Gastrointestinal: Negative for abdominal pain, diarrhea and vomiting.   Genitourinary: Negative for dysuria.   Musculoskeletal: Negative for neck pain.   Skin: Negative for rash.   Allergic/Immunologic: Negative.    Neurological: Negative for weakness, numbness and headaches.   Hematological: Negative.    Psychiatric/Behavioral: Negative.    All  other systems reviewed and are negative.      PHYSICAL EXAM  ED Triage Vitals [09/05/18 1237]   Temp Heart Rate Resp BP SpO2   98.2 °F (36.8 °C) 95 18 178/75 99 %      Temp src Heart Rate Source Patient Position BP Location FiO2 (%)   Oral -- -- -- --       Physical Exam   Constitutional: She is oriented to person, place, and time. No distress.   HENT:   Head: Normocephalic and atraumatic.   Eyes: Pupils are equal, round, and reactive to light. EOM are normal.   Neck: Normal range of motion. Neck supple.   Cardiovascular: Normal rate, regular rhythm and normal heart sounds.    Pulmonary/Chest: Effort normal and breath sounds normal. No respiratory distress.   Abdominal: Soft. There is no tenderness. There is no rebound and no guarding.   Musculoskeletal: Normal range of motion. She exhibits no edema.   Neurological: She is alert and oriented to person, place, and time. She has normal sensation and normal strength.   CN 2-12 nml   Skin: Skin is warm and dry. No rash noted.   Psychiatric: Mood and affect normal.   Nursing note and vitals reviewed.      LAB RESULTS  Lab Results (last 24 hours)     Procedure Component Value Units Date/Time    Urinalysis With Microscopic If Indicated (No Culture) - Urine, Catheter [[id number]]  (Normal) Collected:  09/05/18 1342    Specimen:  Urine from Urine, Clean Catch Updated:  09/05/18 1351     Color, UA Yellow     Appearance, UA Clear     pH, UA 7.0     Specific Gravity, UA <=1.005     Glucose, UA Negative     Ketones, UA Negative     Bilirubin, UA Negative     Blood, UA Negative     Protein, UA Negative     Leuk Esterase, UA Negative     Nitrite, UA Negative     Urobilinogen, UA 0.2 E.U./dL    Narrative:       Urine microscopic not indicated.    CBC & Differential [[id number]] Collected:  09/05/18 1506    Specimen:  Blood Updated:  09/05/18 1518    Narrative:       The following orders were created for panel order CBC & Differential.  Procedure                                Abnormality         Status                     ---------                               -----------         ------                     CBC Auto Differential[[id number]]        Abnormal            Final result                 Please view results for these tests on the individual orders.    Comprehensive Metabolic Panel [[id number]] Collected:  09/05/18 1506    Specimen:  Blood Updated:  09/05/18 1541     Glucose 93 mg/dL      BUN 12 mg/dL      Creatinine 0.88 mg/dL      Sodium 142 mmol/L      Potassium 4.0 mmol/L      Chloride 106 mmol/L      CO2 24.1 mmol/L      Calcium 9.4 mg/dL      Total Protein 6.9 g/dL      Albumin 4.40 g/dL      ALT (SGPT) 17 U/L      AST (SGOT) 20 U/L      Alkaline Phosphatase 74 U/L      Total Bilirubin 0.4 mg/dL      eGFR Non African Amer 62 mL/min/1.73      Globulin 2.5 gm/dL      A/G Ratio 1.8 g/dL      BUN/Creatinine Ratio 13.6     Anion Gap 11.9 mmol/L     Narrative:       The MDRD GFR formula is only valid for adults with stable renal function between ages 18 and 70.    Protime-INR [[id number]]  (Normal) Collected:  09/05/18 1506    Specimen:  Blood Updated:  09/05/18 1536     Protime 13.1 Seconds      INR 1.01    Lipase [[id number]]  (Normal) Collected:  09/05/18 1506    Specimen:  Blood Updated:  09/05/18 1541     Lipase 17 U/L     Magnesium [[id number]]  (Normal) Collected:  09/05/18 1506    Specimen:  Blood Updated:  09/05/18 1541     Magnesium 2.2 mg/dL     Troponin [[id number]]  (Normal) Collected:  09/05/18 1506    Specimen:  Blood Updated:  09/05/18 1541     Troponin T <0.010 ng/mL     Narrative:       Troponin T Reference Ranges:  Less than 0.03 ng/mL:    Negative for AMI  0.03 to 0.09 ng/mL:      Indeterminant for AMI  Greater than 0.09 ng/mL: Positive for AMI    CBC Auto Differential [[id number]]  (Abnormal) Collected:  09/05/18 1506    Specimen:  Blood Updated:  09/05/18 1518     WBC 9.60 10*3/mm3      RBC 5.31 (H) 10*6/mm3      Hemoglobin 15.6 (H) g/dL      Hematocrit 50.5 (H) %       MCV 95.1 fL      MCH 29.4 pg      MCHC 30.9 (L) g/dL      RDW 14.0 (H) %      RDW-SD 48.9 fl      MPV 11.8 fL      Platelets 244 10*3/mm3      Neutrophil % 72.9 %      Lymphocyte % 19.0 (L) %      Monocyte % 6.4 %      Eosinophil % 1.0 %      Basophil % 0.5 %      Immature Grans % 0.2 %      Neutrophils, Absolute 7.00 10*3/mm3      Lymphocytes, Absolute 1.82 10*3/mm3      Monocytes, Absolute 0.61 10*3/mm3      Eosinophils, Absolute 0.10 10*3/mm3      Basophils, Absolute 0.05 10*3/mm3      Immature Grans, Absolute 0.02 10*3/mm3           I ordered the above labs and reviewed the results    RADIOLOGY  CT Head Without Contrast   Preliminary Result       1. There is minimal small vessel disease in the frontal periventricular   white matter, otherwise is a normal head CT. The etiology of the   dizziness and headaches is not established on this exam and if there   remains clinical suspicion of acute infarct an MRI of the brain could be   obtained for more complete assessment. Results were discussed with Dr. Arreola in the emergency room 09/05/2018 at 1:30 PM       Radiation dose reduction techniques were utilized, including automated   exposure control and exposure modulation based on body size.              MRI Brain Without Contrast    (Results Pending)   MRI Angiogram Head Without Contrast    (Results Pending)   MRI Angiogram Neck With & Without Contrast    (Results Pending)        I ordered the above noted radiological studies. Interpreted by radiologist. Discussed with radiologist (Dr. Garcia). Reviewed by me in PACS.       PROCEDURES  Procedures  EKG           EKG time: 1349  Rhythm/Rate: 83, NSR  P waves and ID: nml  QRS, axis: narrow complex, nml QT   ST and T waves: non specific ST changes    Interpreted Contemporaneously by me, independently viewed  unchanged compared to prior 8/24/2018      PROGRESS AND CONSULTS  ED Course as of Sep 05 1718   Wed Sep 05, 2018   1544 No significant change from baseline  Hemoglobin: (!) 15.6 [MM]   1712 5:12 PM  I spoke with Dr. Dang who agrees to admit the patient.  I will check an MRI and MRA of the brain before the patient goes upstairs.  The patient does have a history of a subarachnoid hemorrhage approximately 40 years ago which was atypical in presentation at that time.  I have a low index suspicion given the seriousness of that potential diagnosis I believe we need to do that test now.  I will admit her to a monitor bed and I inform the patient and the family of this plan.  I spoke with DR Tobin will follow up with the MRI results.  After the MRI he will ask for the bed  [MM]      ED Course User Index  [MM] Norberto Arreola MD    1305- Ordered CT head, Mag, Troponin, CMP, Protime, UA, Lipase, CBC, and EKG    1438- ambulate and is not orthostatic.    1546-Rechecked pt. Notified pt of labs and radiology that are not acute.. Discussed the plan to get a MRI of the head to get a better diagnosis, and to admit to the hospital Pt understands and agrees with the plan, all questions answered.  Spoke with daughter and son and explained at length my thought process and answered all questions.  They agree with the plan.  The patient seems to have some increased anxiety just as I was talking to her about the results of her test.  I will go ahead and give her a half milligram of Ativan IV.        MEDICAL DECISION MAKING  Results were reviewed/discussed with the patient and they were also made aware of online access. Pt also made aware that some labs, such as cultures, will not be resulted during ER visit and follow up with PMD is necessary.     MDM  Number of Diagnoses or Management Options     Amount and/or Complexity of Data Reviewed  Clinical lab tests: ordered and reviewed (Nothing acute)  Tests in the radiology section of CPT®: ordered and reviewed (CT head-negative)  Tests in the medicine section of CPT®: ordered and reviewed (See procedure note for EKG  )  Discussion of test  results with the performing providers: yes (Dr. Garcia  )  Decide to obtain previous medical records or to obtain history from someone other than the patient: yes  Review and summarize past medical records: yes (Seen on the 24 of august- complained of dizziness, lightheadedness, and palpitation. Dimer was negative and lab work was unremarkable. EKG was ok. )  Independent visualization of images, tracings, or specimens: yes           DIAGNOSIS  Final diagnoses:   Acute nonintractable headache, unspecified headache type   Palpitations   Dizzy       DISPOSITION  ADMISSION    Discussed treatment plan and reason for admission with pt/family and admitting physician.  Pt/family voiced understanding of the plan for admission for further testing/treatment as needed.         Latest Documented Vital Signs:  As of 5:18 PM  BP- 144/73 HR- 70 Temp- 98.2 °F (36.8 °C) (Oral) O2 sat- 99%    --  Documentation assistance provided by caesar Euceda for Dr. Arreola.  Information recorded by the scribe was done at my direction and has been verified and validated by me.        Paco Euceda  09/05/18 7770       Paco Euceda  09/05/18 7780       Norberto Arreola MD  09/05/18 5754

## 2018-09-05 NOTE — H&P
Name: Isabel Handy ADMIT: 2018   : 1935  PCP: Beth Figueredo APRN    MRN: [id number] LOS: 0 days   AGE/SEX: 82 y.o. female  ROOM:      Chief Complaint   Patient presents with   • Weakness - Generalized     WITH HEAD PRESSURE AT THE BASE OF HER SKULL AND NAUSEA STARTING TODAY       Subjective   Ms. Handy is a 82 y.o. female with a history of HTN and previous remote SAH who presents to [facility] with feelings of malaise and generalized weakness this morning at about 1100. She reports she just felt 'bad'. No vision changes or HA. No focal weakness or numbness. She did not eat breakfast before going to gym and [employer] so she had something to eat but the feeling persisted. She reports occipital head pain and neck tension. No fevers or chills. No neck stiffness. She also reports palpitations at this time. She did not feel she would be able to drive home so she called EMS and came to the ER. She reports her symptoms are somewhat improved now. No chest pain or pressure. No dyspnea. No arm pain or diaphoresis. She did not have word finding difficulty or altered speech. In addition, she reports no dysuria or urgency. No NVD and no issues with diet or appetite.    She had SAH many years ago with similar symptom or occipital headache although this is mild compared to that presentation. She reports 10 day stay in hospital and did not require surgical intervention. She follows with a cardiologist but has not seen for few years and does not recall why she was going there. Denies previous dx of afib or flutter.    Reviewed records and she saw Dr Mcmahon for PVCs.    History of Present Illness    Past Medical History:   Diagnosis Date   • Arrhythmia    • Hyperlipidemia    • Hypertension    • Hypothyroidism    • Subarachnoid hemorrhage (CMS/HCC)      Past Surgical History:   Procedure Laterality Date   • BLEPHAROPLASTY Bilateral    • FOOT SURGERY Bilateral    • HYSTERECTOMY       Family  History   Problem Relation Age of Onset   • Hypertension Mother    • Cancer Mother         Colon   • Heart disease Mother    • Arthritis Mother    • Hypertension Father    • Cancer Father         Prostate   • Heart disease Father    • COPD Sister    • Hypertension Brother    • Heart disease Brother          of heart attack (2 brothers)   • Tuberculosis Brother    • Diabetes Paternal Aunt      Social History   Substance Use Topics   • Smoking status: Former Smoker   • Smokeless tobacco: Never Used      Comment: years ago - too many years to remember   • Alcohol use No       (Not in a hospital admission)  Allergies:    Allergies   Allergen Reactions   • Gatifloxacin Arrhythmia   • Rosuvastatin Myalgia       Review of Systems   Constitutional: Negative for chills and fever.   HENT: Negative for sore throat and trouble swallowing.    Eyes: Negative for pain and visual disturbance.   Respiratory: Negative for cough and wheezing.    Cardiovascular: Positive for palpitations. Negative for chest pain and leg swelling.   Gastrointestinal: Negative for constipation, diarrhea, nausea and vomiting.   Endocrine: Negative for cold intolerance and heat intolerance.   Genitourinary: Negative for difficulty urinating and dysuria.   Musculoskeletal: Positive for neck pain. Negative for neck stiffness.   Skin: Negative for pallor and rash.   Allergic/Immunologic: Negative for environmental allergies and food allergies.   Neurological: Negative for seizures and syncope.   Hematological: Negative for adenopathy. Does not bruise/bleed easily.   Psychiatric/Behavioral: Negative for agitation and confusion.        Objective    Vital Signs  Temp:  [98.2 °F (36.8 °C)] 98.2 °F (36.8 °C)  Heart Rate:  [70-95] 70  Resp:  [18] 18  BP: (130-178)/(66-95) 150/74  SpO2:  [99 %] 99 %  on   ;   Device (Oxygen Therapy): room air  Body mass index is 29.11 kg/m².    Physical Exam   Constitutional: She appears well-developed. No distress.   HENT:    Head: Normocephalic and atraumatic.   Nose: Nose normal.   Eyes: Pupils are equal, round, and reactive to light. Conjunctivae and EOM are normal.   Neck: Normal range of motion. Neck supple. No neck rigidity. No Brudzinski's sign and no Kernig's sign noted.   Cardiovascular: Normal rate, regular rhythm and intact distal pulses.    Pulmonary/Chest: Effort normal and breath sounds normal. She has no wheezes.   Abdominal: Soft. Bowel sounds are normal. She exhibits no distension. There is no tenderness.   Musculoskeletal: Normal range of motion. She exhibits no edema.   Neurological: She is alert. No cranial nerve deficit. She exhibits normal muscle tone. Coordination normal.   Skin: Skin is warm and dry. She is not diaphoretic.   Psychiatric: She has a normal mood and affect. Her behavior is normal.   Nursing note and vitals reviewed.      Results Review:  I reviewed the patient's new clinical results.  I reviewed imaging, agree with interpretation.  I reviewed EKG/telemetry, sinus rhythm, normal intervals, no acute st change.  I reviewed prior records.    Lab Results (last 24 hours)     Procedure Component Value Units Date/Time    Urinalysis With Microscopic If Indicated (No Culture) - Urine, Catheter [[id number]]  (Normal) Collected:  09/05/18 1342    Specimen:  Urine from Urine, Clean Catch Updated:  09/05/18 1351     Color, UA Yellow     Appearance, UA Clear     pH, UA 7.0     Specific Gravity, UA <=1.005     Glucose, UA Negative     Ketones, UA Negative     Bilirubin, UA Negative     Blood, UA Negative     Protein, UA Negative     Leuk Esterase, UA Negative     Nitrite, UA Negative     Urobilinogen, UA 0.2 E.U./dL    Narrative:       Urine microscopic not indicated.    CBC & Differential [[id number]] Collected:  09/05/18 1506    Specimen:  Blood Updated:  09/05/18 1518    Narrative:       The following orders were created for panel order CBC & Differential.  Procedure                               Abnormality          Status                     ---------                               -----------         ------                     CBC Auto Differential[[id number]]        Abnormal            Final result                 Please view results for these tests on the individual orders.    Comprehensive Metabolic Panel [[id number]] Collected:  09/05/18 1506    Specimen:  Blood Updated:  09/05/18 1541     Glucose 93 mg/dL      BUN 12 mg/dL      Creatinine 0.88 mg/dL      Sodium 142 mmol/L      Potassium 4.0 mmol/L      Chloride 106 mmol/L      CO2 24.1 mmol/L      Calcium 9.4 mg/dL      Total Protein 6.9 g/dL      Albumin 4.40 g/dL      ALT (SGPT) 17 U/L      AST (SGOT) 20 U/L      Alkaline Phosphatase 74 U/L      Total Bilirubin 0.4 mg/dL      eGFR Non African Amer 62 mL/min/1.73      Globulin 2.5 gm/dL      A/G Ratio 1.8 g/dL      BUN/Creatinine Ratio 13.6     Anion Gap 11.9 mmol/L     Narrative:       The MDRD GFR formula is only valid for adults with stable renal function between ages 18 and 70.    Protime-INR [[id number]]  (Normal) Collected:  09/05/18 1506    Specimen:  Blood Updated:  09/05/18 1536     Protime 13.1 Seconds      INR 1.01    Lipase [[id number]]  (Normal) Collected:  09/05/18 1506    Specimen:  Blood Updated:  09/05/18 1541     Lipase 17 U/L     Magnesium [[id number]]  (Normal) Collected:  09/05/18 1506    Specimen:  Blood Updated:  09/05/18 1541     Magnesium 2.2 mg/dL     Troponin [[id number]]  (Normal) Collected:  09/05/18 1506    Specimen:  Blood Updated:  09/05/18 1541     Troponin T <0.010 ng/mL     Narrative:       Troponin T Reference Ranges:  Less than 0.03 ng/mL:    Negative for AMI  0.03 to 0.09 ng/mL:      Indeterminant for AMI  Greater than 0.09 ng/mL: Positive for AMI    CBC Auto Differential [[id number]]  (Abnormal) Collected:  09/05/18 1506    Specimen:  Blood Updated:  09/05/18 1518     WBC 9.60 10*3/mm3      RBC 5.31 (H) 10*6/mm3      Hemoglobin 15.6 (H) g/dL      Hematocrit 50.5 (H) %      MCV 95.1 fL       MCH 29.4 pg      MCHC 30.9 (L) g/dL      RDW 14.0 (H) %      RDW-SD 48.9 fl      MPV 11.8 fL      Platelets 244 10*3/mm3      Neutrophil % 72.9 %      Lymphocyte % 19.0 (L) %      Monocyte % 6.4 %      Eosinophil % 1.0 %      Basophil % 0.5 %      Immature Grans % 0.2 %      Neutrophils, Absolute 7.00 10*3/mm3      Lymphocytes, Absolute 1.82 10*3/mm3      Monocytes, Absolute 0.61 10*3/mm3      Eosinophils, Absolute 0.10 10*3/mm3      Basophils, Absolute 0.05 10*3/mm3      Immature Grans, Absolute 0.02 10*3/mm3           CT Head Without Contrast   Preliminary Result       1. There is minimal small vessel disease in the frontal periventricular   white matter, otherwise is a normal head CT. The etiology of the   dizziness and headaches is not established on this exam and if there   remains clinical suspicion of acute infarct an MRI of the brain could be   obtained for more complete assessment. Results were discussed with Dr. Arreola in the emergency room 09/05/2018 at 1:30 PM       Radiation dose reduction techniques were utilized, including automated   exposure control and exposure modulation based on body size.              MRI Brain Without Contrast    (Results Pending)   MRI Angiogram Head Without Contrast    (Results Pending)   MRI Angiogram Neck With & Without Contrast    (Results Pending)     Assessment/Plan      Active Hospital Problems    Diagnosis Date Noted   • **Occipital pain [R51] 09/05/2018   • Malaise and fatigue [R53.81, R53.83] 09/05/2018   • Palpitations [R00.2] 03/15/2016   • Essential hypertension [I10] 03/15/2016      Resolved Hospital Problems    Diagnosis Date Noted Date Resolved   No resolved problems to display.     - Occipital Pain: CT without acute bleed. MRI/A ordered in ER. Will follow these results. She has no fever or nuchal rigidity and her symptoms are improving. If the MRI is negative and her symptoms persist, then can consider LP. Will monitor for the time being.  -  Malaise/Fatigue: No infectious signs or anemia. Renal function is stable. She is hypothyroid and taking synthroid. Will check TSH to monitor. PT consult.  - Palpitations: EKG ok. Troponin negative. No chest pain. Will monitor on telemetry overnight and check BNP. Should issues arise, can consult Dr Mcmahon.  - HTN: Continue home regimen. Will check orthostatics.  - PPx: SCD  - Full Code    I discussed the patients findings and my recommendations with patient, family and consulting provider.    Gerardo Arce MD  [facility]  09/05/18  5:41 PM

## 2018-09-05 NOTE — PROGRESS NOTES
Clinical Pharmacy Services: Medication History    Isabel Handy is a 82 y.o. female presenting to [facility] for   Chief Complaint   Patient presents with   • Weakness - Generalized     WITH HEAD PRESSURE AT THE BASE OF HER SKULL AND NAUSEA STARTING TODAY       She  has a past medical history of Arrhythmia; Hyperlipidemia; Hypertension; Hypothyroidism; and Subarachnoid hemorrhage (CMS/HCC).    Allergies as of 09/05/2018 - Reviewed 09/05/2018   Allergen Reaction Noted   • Gatifloxacin Arrhythmia 03/15/2016   • Rosuvastatin Myalgia 03/15/2016       Medication information was obtained from: Patient  Pharmacy and Phone Number: [facility] [phone]    Prior to Admission Medications     Prescriptions Last Dose Informant Patient Reported? Taking?    amitriptyline (ELAVIL) 25 MG tablet  Self No Yes    Take 1 tablet by mouth Every Night.    Patient taking differently:  Take 12.5 mg by mouth Every Night.    aspirin 81 MG tablet  Self Yes Yes    Take 1 tablet by mouth daily.    atorvastatin (LIPITOR) 20 MG tablet  Self Yes Yes    Take 20 mg by mouth Every Night.    esomeprazole (NexIUM) 20 MG capsule  Self Yes Yes    Take 20 mg by mouth every morning before breakfast.    estradiol (ESTRACE) 0.5 MG tablet  Self Yes Yes    Take 0.25 mg by mouth Daily.    levothyroxine (SYNTHROID, LEVOTHROID) 75 MCG tablet  Self No Yes    Take 1 tablet by mouth Daily.    lisinopril (PRINIVIL,ZESTRIL) 10 MG tablet  Self No Yes    Take 1 tablet by mouth Daily.    meloxicam (MOBIC) 7.5 MG tablet Past Month Self No Yes    Take 1 tablet by mouth Daily As Needed for Mild Pain . Take with food    Multiple Vitamins-Minerals (MULTIVITAMIN WITH MINERALS) tablet tablet  Self Yes Yes    Take 1 tablet by mouth Daily.    Probiotic Product (SOLUBLE FIBER/PROBIOTICS) chewable tablet  Self Yes Yes    Chew 1 each Daily.    VENTOLIN  (90 Base) MCG/ACT inhaler  Self Yes Yes    Inhale 2 puffs Every 6 (Six) Hours As Needed.            Medication  notes: None    This medication list is complete to the best of my knowledge as of 9/5/2018    Please call if questions.    Jannette Reyes, Medication History Technician  9/5/2018 5:50 PM

## 2018-09-06 VITALS
RESPIRATION RATE: 16 BRPM | DIASTOLIC BLOOD PRESSURE: 50 MMHG | WEIGHT: 164.24 LBS | BODY MASS INDEX: 29.1 KG/M2 | HEIGHT: 63 IN | HEART RATE: 69 BPM | OXYGEN SATURATION: 96 % | SYSTOLIC BLOOD PRESSURE: 105 MMHG | TEMPERATURE: 97.9 F

## 2018-09-06 LAB
ANION GAP SERPL CALCULATED.3IONS-SCNC: 10.5 MMOL/L
BASOPHILS # BLD AUTO: 0.04 10*3/MM3 (ref 0–0.2)
BASOPHILS NFR BLD AUTO: 0.6 % (ref 0–1.5)
BUN BLD-MCNC: 13 MG/DL (ref 8–23)
BUN/CREAT SERPL: 17.6 (ref 7–25)
CALCIUM SPEC-SCNC: 8.3 MG/DL (ref 8.6–10.5)
CHLORIDE SERPL-SCNC: 108 MMOL/L (ref 98–107)
CO2 SERPL-SCNC: 22.5 MMOL/L (ref 22–29)
CREAT BLD-MCNC: 0.74 MG/DL (ref 0.57–1)
DEPRECATED RDW RBC AUTO: 50.6 FL (ref 37–54)
EOSINOPHIL # BLD AUTO: 0.39 10*3/MM3 (ref 0–0.7)
EOSINOPHIL NFR BLD AUTO: 5.9 % (ref 0.3–6.2)
ERYTHROCYTE [DISTWIDTH] IN BLOOD BY AUTOMATED COUNT: 14.5 % (ref 11.7–13)
GFR SERPL CREATININE-BSD FRML MDRD: 76 ML/MIN/1.73
GLUCOSE BLD-MCNC: 95 MG/DL (ref 65–99)
HCT VFR BLD AUTO: 43.5 % (ref 35.6–45.5)
HGB BLD-MCNC: 13.7 G/DL (ref 11.9–15.5)
IMM GRANULOCYTES # BLD: 0.01 10*3/MM3 (ref 0–0.03)
IMM GRANULOCYTES NFR BLD: 0.2 % (ref 0–0.5)
INR PPP: 1.1 (ref 0.9–1.1)
LYMPHOCYTES # BLD AUTO: 2.5 10*3/MM3 (ref 0.9–4.8)
LYMPHOCYTES NFR BLD AUTO: 37.8 % (ref 19.6–45.3)
MCH RBC QN AUTO: 29.8 PG (ref 26.9–32)
MCHC RBC AUTO-ENTMCNC: 31.5 G/DL (ref 32.4–36.3)
MCV RBC AUTO: 94.8 FL (ref 80.5–98.2)
MONOCYTES # BLD AUTO: 0.39 10*3/MM3 (ref 0.2–1.2)
MONOCYTES NFR BLD AUTO: 5.9 % (ref 5–12)
NEUTROPHILS # BLD AUTO: 3.29 10*3/MM3 (ref 1.9–8.1)
NEUTROPHILS NFR BLD AUTO: 49.8 % (ref 42.7–76)
NRBC BLD MANUAL-RTO: 0 /100 WBC (ref 0–0)
NT-PROBNP SERPL-MCNC: 175.9 PG/ML (ref 0–1800)
PLATELET # BLD AUTO: 202 10*3/MM3 (ref 140–500)
PMV BLD AUTO: 11.9 FL (ref 6–12)
POTASSIUM BLD-SCNC: 4.2 MMOL/L (ref 3.5–5.2)
PROTHROMBIN TIME: 14 SECONDS (ref 11.7–14.2)
RBC # BLD AUTO: 4.59 10*6/MM3 (ref 3.9–5.2)
SODIUM BLD-SCNC: 141 MMOL/L (ref 136–145)
TSH SERPL DL<=0.05 MIU/L-ACNC: 5.01 MIU/ML (ref 0.27–4.2)
WBC NRBC COR # BLD: 6.61 10*3/MM3 (ref 4.5–10.7)

## 2018-09-06 PROCEDURE — 83880 ASSAY OF NATRIURETIC PEPTIDE: CPT | Performed by: INTERNAL MEDICINE

## 2018-09-06 PROCEDURE — G8979 MOBILITY GOAL STATUS: HCPCS

## 2018-09-06 PROCEDURE — 84443 ASSAY THYROID STIM HORMONE: CPT | Performed by: INTERNAL MEDICINE

## 2018-09-06 PROCEDURE — G8980 MOBILITY D/C STATUS: HCPCS

## 2018-09-06 PROCEDURE — 80048 BASIC METABOLIC PNL TOTAL CA: CPT | Performed by: INTERNAL MEDICINE

## 2018-09-06 PROCEDURE — 85610 PROTHROMBIN TIME: CPT | Performed by: INTERNAL MEDICINE

## 2018-09-06 PROCEDURE — 97162 PT EVAL MOD COMPLEX 30 MIN: CPT

## 2018-09-06 PROCEDURE — 96361 HYDRATE IV INFUSION ADD-ON: CPT

## 2018-09-06 PROCEDURE — G0378 HOSPITAL OBSERVATION PER HR: HCPCS

## 2018-09-06 PROCEDURE — G8978 MOBILITY CURRENT STATUS: HCPCS

## 2018-09-06 PROCEDURE — 85025 COMPLETE CBC W/AUTO DIFF WBC: CPT | Performed by: INTERNAL MEDICINE

## 2018-09-06 RX ADMIN — ATORVASTATIN CALCIUM 20 MG: 20 TABLET, FILM COATED ORAL at 01:07

## 2018-09-06 RX ADMIN — LEVOTHYROXINE SODIUM 75 MCG: 75 TABLET ORAL at 06:16

## 2018-09-06 RX ADMIN — ASPIRIN 81 MG: 81 TABLET ORAL at 10:07

## 2018-09-06 RX ADMIN — LISINOPRIL 10 MG: 10 TABLET ORAL at 10:07

## 2018-09-06 RX ADMIN — HYDROCODONE BITARTRATE AND ACETAMINOPHEN 1 TABLET: 5; 325 TABLET ORAL at 00:25

## 2018-09-06 RX ADMIN — AMITRIPTYLINE HYDROCHLORIDE 12.5 MG: 25 TABLET, FILM COATED ORAL at 01:08

## 2018-09-06 RX ADMIN — Medication 1 CAPSULE: at 10:07

## 2018-09-06 RX ADMIN — PANTOPRAZOLE SODIUM 40 MG: 40 TABLET, DELAYED RELEASE ORAL at 06:16

## 2018-09-06 NOTE — THERAPY EVALUATION
Acute Care - Physical Therapy Initial Evaluation  [facility]     Patient Name: Isabel Handy  : 1935  MRN: [id number]  Today's Date: 2018   Onset of Illness/Injury or Date of Surgery: (P) 18  Date of Referral to PT: (P) 18  Referring Physician: (P) Gerardo Arce MD      Admit Date: 2018    Visit Dx:     ICD-10-CM ICD-9-CM   1. Acute nonintractable headache, unspecified headache type R51 784.0   2. Palpitations R00.2 785.1   3. Dizzy R42 780.4     Patient Active Problem List   Diagnosis   • Gastroesophageal reflux disease   • Menopausal syndrome   • Osteoarthritis of cervical spine   • Carotid atherosclerosis   • Prediabetes   • Low back pain   • Insomnia   • Hypothyroidism   • Essential hypertension   • Hyperlipidemia   • Allergic rhinitis   • Palpitations   • Anxiety   • Vitamin D deficiency   • Lung nodule   • Hyperglycemia   • Occipital pain   • Malaise and fatigue   • Acute nonintractable headache     Past Medical History:   Diagnosis Date   • Arrhythmia    • Hyperlipidemia    • Hypertension    • Hypothyroidism    • Subarachnoid hemorrhage (CMS/HCC)      Past Surgical History:   Procedure Laterality Date   • BLEPHAROPLASTY Bilateral    • FOOT SURGERY Bilateral    • HYSTERECTOMY          PT ASSESSMENT (last 12 hours)      Physical Therapy Evaluation     Row Name 18 09          PT Evaluation Time/Intention    Subjective Information (P)  complains of;pain   mild pain from IV placement  -DB     Document Type (P)  evaluation  -DB     Mode of Treatment (P)  physical therapy  -DB     Patient Effort (P)  excellent  -DB     Symptoms Noted During/After Treatment (P)  none  -DB     Row Name 18 09          General Information    Patient Profile Reviewed? (P)  yes  -DB     Onset of Illness/Injury or Date of Surgery (P)  18  -DB     Referring Physician (P)  Gerardo Arce MD  -DB     Patient Observations (P)  alert;cooperative  -DB     Patient/Family  Observations (P)  Pt. supine in bed, no signs of distress  -DB     Prior Level of Function (P)  independent:  -DB     Equipment Currently Used at Home (P)  none  -DB     Existing Precautions/Restrictions (P)  no known precautions/restrictions  -DB     Row Name 09/06/18 0903          Relationship/Environment    Lives With (P)  alone  -DB     Family Caregiver if Needed (P)  child(dilma), adult  -DB     Row Name 09/06/18 0903          Resource/Environmental Concerns    Current Living Arrangements (P)  home/apartment/condo  -DB     Resource/Environmental Concerns (P)  none  -DB     Transportation Concerns (P)  car, none  -DB     Row Name 09/06/18 0903          Cognitive Assessment/Intervention- PT/OT    Orientation Status (Cognition) (P)  oriented x 4  -DB     Follows Commands (Cognition) (P)  WNL  -DB     Row Name 09/06/18 0903          Bed Mobility Assessment/Treatment    Bed Mobility Assessment/Treatment (P)  supine-sit;sit-supine  -DB     Supine-Sit [place] (Bed Mobility) (P)  conditional independence;independent  -DB     Sit-Supine [place] (Bed Mobility) (P)  not tested  -DB     Row Name 09/06/18 0903          Transfer Assessment/Treatment    Transfer Assessment/Treatment (P)  sit-stand transfer;stand-sit transfer  -DB     Sit-Stand [place] (Transfers) (P)  independent  -DB     Stand-Sit [place] (Transfers) (P)  independent  -DB     Row Name 09/06/18 0903          Gait/Stairs Assessment/Training    [place] Level (Gait) (P)  independent;supervision  -DB     Distance in Feet (Gait) (P)  150 ft  -DB     Pattern (Gait) (P)  step-through  -DB     Comment (Gait/Stairs) (P)  pt. demonstrates functional gait and franky, no deficits noted  -DB     Row Name 09/06/18 0903          General ROM    GENERAL ROM COMMENTS (P)  BUE & BLE AROM WNL  -DB     Row Name 09/06/18 0903          MMT (Manual Muscle Testing)    General MMT Comments (P)  BUE and BLE MMT WNL  -DB     Row Name 09/06/18 0903          Motor  Assessment/Intervention    Additional Documentation (P)  Balance (Group)  -DB     Row Name 09/06/18 0903          Balance    Balance (P)  static sitting balance;static standing balance  -DB     Row Name 09/06/18 0903          Static Sitting Balance    Level of [place] (Unsupported Sitting, Static Balance) (P)  independent  -DB     Sitting Position (Unsupported Sitting, Static Balance) (P)  sitting on edge of bed  -DB     Time Able to Maintain Position (Unsupported Sitting, Static Balance) (P)  2 to 3 minutes  -DB     Row Name 09/06/18 0903          Static Standing Balance    Level of [place] (Unsupported Standing, Static Balance) (P)  independent  -DB     Time Able to Maintain Position (Unsupported Standing, Static Balance) (P)  1 to 2 minutes  -DB     Row Name 09/06/18 0903          Pain Assessment    Additional Documentation (P)  Pain Scale: Word Pre/Post-Treatment (Group)  -DB     Row Name 09/06/18 0903          Pain Scale: Numbers Pre/Post-Treatment    Pain Location - Side (P)  Right  -DB     Pain Location (P)  --   forearm, IV placement  -DB     Row Name 09/06/18 0903          Pain Scale: Word Pre/Post-Treatment    Pain: Word Scale, Pretreatment (P)  2 - mild pain  -DB     Row Name 09/06/18 0903          Physical Therapy Clinical Impression    Date of Referral to PT (P)  09/06/18  -DB     Functional Level at Time of Evaluation (PT Clinical Impression) (P)  Ind. with all functional mobility  -DB     Patient/Family Goals Statement (PT Clinical Impression) (P)  Return to home  -DB     Criteria for Skilled Interventions Met (PT Clinical Impression) (P)  no problems identified which require skilled intervention  -DB     Row Name 09/06/18 0903          Vital Signs    Pretreatment Heart Rate (beats/min) (P)  70  -DB     Pre SpO2 (%) (P)  98  -DB     Row Name 09/06/18 0903          Positioning and Restraints    Pre-Treatment Position (P)  in bed  -DB     Post Treatment Position (P)  chair  -DB     In Chair  (P)  reclined;call light within reach;encouraged to call for assist  -DB     Row Name 09/06/18 0903          Living Environment    Home Accessibility (P)  --   no stairs  -DB       User Key  (r) = Recorded By, (t) = Taken By, (c) = Cosigned By    Initials Name Provider Type    DB Hector Kevin, PT Student PT Student          Physical Therapy Education     Title: PT OT SLP Therapies (Done)     Topic: Physical Therapy (Done)     Point: Mobility training (Done)    Learning Progress Summary     Learner Status Readiness Method Response Comment Documented by    Patient Done Acceptance E,D DU  DB 09/06/18 0918                      User Key     Initials Effective Dates Name Provider Type Discipline    DB 08/17/18 -  Hector Kevin, PT Student PT Student PT                PT Recommendation and Plan  Anticipated Discharge Disposition (PT): (P) home  Therapy Frequency (PT Clinical Impression): (P) evaluation only  Outcome Summary/Treatment Plan (PT)  Anticipated Discharge Disposition (PT): (P) home  Outcome Summary: (P) Pt. presents as ind. with all functional mobility, therefore skilled PT is not warranted at this time. Pt. lived at home ind. in an apartment prior to admit, with plans to return home upon discharge. Pt. amb. 150 ft and performed all transfers independently, no supervision required.           Outcome Measures     Row Name 09/06/18 0900             How much help from another person do you currently need...    Turning from your back to your side while in flat bed without using bedrails? (P)  4  -DB      Moving from lying on back to sitting on the side of a flat bed without bedrails? (P)  4  -DB      Moving to and from a bed to a chair (including a wheelchair)? (P)  4  -DB      Standing up from a chair using your arms (e.g., wheelchair, bedside chair)? (P)  4  -DB      Climbing 3-5 steps with a railing? (P)  4  -DB      To walk in hospital room? (P)  4  -DB      AM-PAC 6 Clicks Score (P)  24  -DB          Functional Assessment    Outcome Measure Options (P)  AM-PAC 6 Clicks Basic Mobility (PT)  -DB        User Key  (r) = Recorded By, (t) = Taken By, (c) = Cosigned By    Initials Name Provider Type    Hector Stern, PT Student PT Student           Time Calculation:         PT Charges     Row Name 09/06/18 0922             Time Calculation    Start Time (P)  0846  -DB      Stop Time (P)  0901  -DB      Time Calculation (min) (P)  15 min  -DB      PT Received On (P)  09/06/18  -DB        User Key  (r) = Recorded By, (t) = Taken By, (c) = Cosigned By    Initials Name Provider Type    Hector Stern, PT Student PT Student        Therapy Suggested Charges     Code   Minutes Charges    None           Therapy Charges for Today     Code Description Service Date Service Provider Modifiers Qty    18041699311 HC PT EVAL MOD COMPLEXITY 1 9/6/2018 Hector Kevin, PT Student GP 1          PT G-Codes  Outcome Measure Options: (P) AM-PAC 6 Clicks Basic Mobility (PT)  AM-PAC 6 Clicks Score: (P) 24      Hector Kevin PT Student  9/6/2018

## 2018-09-06 NOTE — PLAN OF CARE
Problem: Fall Risk (Adult)  Goal: Identify Related Risk Factors and Signs and Symptoms  Outcome: Ongoing (interventions implemented as appropriate)    Goal: Absence of Fall  Outcome: Ongoing (interventions implemented as appropriate)      Problem: Patient Care Overview  Goal: Plan of Care Review  Outcome: Ongoing (interventions implemented as appropriate)   09/06/18 0431   Coping/Psychosocial   Plan of Care Reviewed With patient   Plan of Care Review   Progress improving   OTHER   Outcome Summary vitals stable. pt expressed pain. meds given per orders. possible discharge today. will continue to monitor.        Problem: Pain, Acute (Adult)  Goal: Identify Related Risk Factors and Signs and Symptoms  Outcome: Ongoing (interventions implemented as appropriate)

## 2018-09-06 NOTE — PLAN OF CARE
Problem: Patient Care Overview  Goal: Plan of Care Review   09/06/18 0960   OTHER   Outcome Summary Pt. presents as ind. with all functional mobility, therefore skilled PT is not warranted at this time. Pt. lived at home ind. in an apartment prior to admit, with plans to return home upon discharge. Pt. amb. 150 ft and performed all transfers independently, no supervision required.

## 2018-09-06 NOTE — DISCHARGE SUMMARY
[facility]    Date of Discharge:  9/6/2018    PCP: Beth Figueredo APRN    Discharge Diagnosis:   Active Hospital Problems    Diagnosis Date Noted   • **Occipital pain [R51] 09/05/2018   • Malaise and fatigue [R53.81, R53.83] 09/05/2018   • Acute nonintractable headache [R51] 09/05/2018   • Palpitations [R00.2] 03/15/2016   • Essential hypertension [I10] 03/15/2016      Resolved Hospital Problems    Diagnosis Date Noted Date Resolved   No resolved problems to display.     Procedures Performed       Consults     Date and Time Order Name Status Description    9/5/2018 8956 [facility] (on-call MD unless specified) Completed         Hospital Course  Please see history and physical for details. Patient is a 82 y.o. female initially admitted by one of my colleagues due to headache.  Patient denies any recent increased activity involving her upper extremities or neck.  Her headaches and previous complaints have resolved.  Ultimately she was admitted to rule out more serious underlying issues and MRI and MRA of the head and neck are negative.  Her lab work is also normal other that she has a mildly elevated TSH at 5.01 and I'll defer to PCP to recheck that at a later date as they may need to reduce her levothyroxine.  Patient very much wants to go home at this juncture and clinically feels back to baseline.  Physical therapy has evaluated the patient and she is completely neurologically intact.  I honestly feel perhaps aspects of anxiety could've composite compounded her above complaints.  Counseled her to follow-up with her PCP as currently already scheduled and if this becomes a persistent issue maybe consider pain management referral for occipital injections.  All questions answered to the patient the best my ability.  No family present at bedside so discussed case with RN to ensure smooth transition home.  No additional discharge needs warrant per CCP.        Condition on  Discharge: Improved.     Temp:  [97.4 °F (36.3 °C)-98.2 °F (36.8 °C)] 97.9 °F (36.6 °C)  Heart Rate:  [58-95] 69  Resp:  [16-18] 16  BP: (100-178)/(50-95) 105/50  Body mass index is 29.09 kg/m².    Physical Exam   Constitutional: She is oriented to person, place, and time. She appears well-developed and well-nourished.   HENT:   Head: Normocephalic and atraumatic.   Eyes: Pupils are equal, round, and reactive to light. EOM are normal.   Neck: Normal range of motion. Neck supple. No JVD present.   Cardiovascular: Normal rate and regular rhythm.    Pulmonary/Chest: Effort normal and breath sounds normal. No respiratory distress.   Abdominal: Soft. Bowel sounds are normal. She exhibits no distension. There is no tenderness.   Lymphadenopathy:     She has no cervical adenopathy.   Neurological: She is alert and oriented to person, place, and time. No cranial nerve deficit. She exhibits normal muscle tone.   Appropriate bilateral upper strength at 5 out of 5 as is    Psychiatric: She has a normal mood and affect. Her behavior is normal.        Discharge Medications      Changes to Medications      Instructions Start Date   amitriptyline 25 MG tablet  Commonly known as:  ELAVIL  What changed:  how much to take   25 mg, Oral, Nightly         Continue These Medications      Instructions Start Date   aspirin 81 MG tablet   1 tablet, Oral, Daily      atorvastatin 20 MG tablet  Commonly known as:  LIPITOR   20 mg, Oral, Nightly      esomeprazole 20 MG capsule  Commonly known as:  nexIUM   20 mg, Oral, Every Morning Before Breakfast      estradiol 0.5 MG tablet  Commonly known as:  ESTRACE   0.25 mg, Oral, Daily      levothyroxine 75 MCG tablet  Commonly known as:  SYNTHROID, LEVOTHROID   75 mcg, Oral, Daily      lisinopril 10 MG tablet  Commonly known as:  PRINIVIL,ZESTRIL   10 mg, Oral, Daily      meloxicam 7.5 MG tablet  Commonly known as:  MOBIC   7.5 mg, Oral, Daily PRN, Take with food      multivitamin with minerals  tablet tablet   1 tablet, Oral, Daily      SOLUBLE FIBER/PROBIOTICS chewable tablet   1 each, Oral, Daily              Additional Instructions for the Follow-ups that You Need to Schedule     Discharge Follow-up with PCP    As directed      Currently Documented PCP:  Beth Figueredo APRN  PCP Phone Number:  [phone]    Follow Up Details:  pcp as already scheduled           Follow-up Information     Beth Figueredo APRN .    Specialty:  Family Medicine  Why:  pcp as already scheduled  Contact information:  [facility][note 3][address] [address]  [phone]                 Test Results Pending at Discharge     Fabian Mcfarland MD  09/06/18  11:34 AM    Discharge time spent greater than 30 minutes.

## 2018-09-06 NOTE — PROGRESS NOTES
Discharge Planning Assessment  [facility]     Patient Name: Isabel Handy  MRN: [id number]  Today's Date: 9/6/2018    Admit Date: 9/5/2018          Discharge Needs Assessment     Row Name 09/06/18 1039       Living Environment    Lives With alone    Current Living Arrangements home/apartment/condo    Primary Care Provided by self    Provides Primary Care For no one    Family Caregiver if Needed none    Quality of Family Relationships helpful;involved;supportive    Able to Return to Prior Arrangements yes       Resource/Environmental Concerns    Resource/Environmental Concerns none    Transportation Concerns car, none       Transition Planning    Patient/Family Anticipates Transition to home    Patient/Family Anticipated Services at Transition none    Transportation Anticipated family or friend will provide       Discharge Needs Assessment    Readmission Within the Last 30 Days no previous admission in last 30 days    Concerns to be Addressed no discharge needs identified;denies needs/concerns at this time    Equipment Currently Used at Home none    Anticipated Changes Related to Illness none    Equipment Needed After Discharge none    Offered/Gave Vendor List no   N/A            Discharge Plan     Row Name 09/06/18 1034       Plan    Plan Home no needs- son to transport home    Patient/Family in Agreement with Plan yes    Plan Comments Spoke with patient at bedside, introduced self and explained CCP role. Verified facesheet and confirmed pharmacy is [facility] in [place], pt denies problems with medication costs. Pt was IADL prior to admission, denies any DME, HH or SNF. Pt states plan will be home and son to transport. MOON notice provided and copy given to patient. Noble ESCALONA/CCP         Destination     No service coordination in this encounter.      Durable Medical Equipment     No service coordination in this encounter.      Dialysis/Infusion     No service coordination in this encounter.       Home Medical Care     No service coordination in this encounter.      Social Care     No service coordination in this encounter.                Demographic Summary     Row Name 09/06/18 1033       General Information    Admission Type observation    Required Notices Provided Observation Status Notice    Referral Source admission list    Reason for Consult discharge planning    Preferred Language English     Used During This Interaction no       Contact Information    Permission Granted to Share Info With family/designee            Functional Status     Row Name 09/06/18 1039       Functional Status    Usual Activity Tolerance good    Current Activity Tolerance good       Functional Status, IADL    Medications independent    Meal Preparation independent    Housekeeping independent    Laundry independent    Shopping independent       Mental Status    General Appearance WDL WDL       Mental Status Summary    Recent Changes in Mental Status/Cognitive Functioning no changes       Employment/    Employment Status retired            Psychosocial    No documentation.           Abuse/Neglect    No documentation.           Legal     Row Name 09/06/18 1041       Financial/Legal    Finance Comments Pt denies advance directive.            Substance Abuse    No documentation.           Patient Forms     Row Name 09/06/18 1041       Patient Forms    Patient Observation Letter Delivered    Delivered to Patient    Method of delivery In person          Emy Kang RN

## 2018-09-12 ENCOUNTER — OFFICE VISIT (OUTPATIENT)
Dept: INTERNAL MEDICINE | Facility: CLINIC | Age: 83
End: 2018-09-12

## 2018-09-12 VITALS
HEART RATE: 74 BPM | BODY MASS INDEX: 29.02 KG/M2 | OXYGEN SATURATION: 98 % | DIASTOLIC BLOOD PRESSURE: 66 MMHG | SYSTOLIC BLOOD PRESSURE: 130 MMHG | HEIGHT: 63 IN | TEMPERATURE: 97.9 F | WEIGHT: 163.8 LBS

## 2018-09-12 DIAGNOSIS — Z00.00 MEDICARE ANNUAL WELLNESS VISIT, SUBSEQUENT: Primary | ICD-10-CM

## 2018-09-12 DIAGNOSIS — I10 ESSENTIAL HYPERTENSION: ICD-10-CM

## 2018-09-12 DIAGNOSIS — M47.812 OSTEOARTHRITIS OF CERVICAL SPINE, UNSPECIFIED SPINAL OSTEOARTHRITIS COMPLICATION STATUS: ICD-10-CM

## 2018-09-12 DIAGNOSIS — R51.9 INTRACTABLE HEADACHE, UNSPECIFIED CHRONICITY PATTERN, UNSPECIFIED HEADACHE TYPE: ICD-10-CM

## 2018-09-12 DIAGNOSIS — Z78.0 POST-MENOPAUSE: ICD-10-CM

## 2018-09-12 DIAGNOSIS — E78.5 HYPERLIPIDEMIA, UNSPECIFIED HYPERLIPIDEMIA TYPE: ICD-10-CM

## 2018-09-12 DIAGNOSIS — E03.9 HYPOTHYROIDISM, UNSPECIFIED TYPE: ICD-10-CM

## 2018-09-12 PROCEDURE — 99214 OFFICE O/P EST MOD 30 MIN: CPT | Performed by: NURSE PRACTITIONER

## 2018-09-12 PROCEDURE — G0439 PPPS, SUBSEQ VISIT: HCPCS | Performed by: NURSE PRACTITIONER

## 2018-09-12 RX ORDER — LISINOPRIL 10 MG/1
10 TABLET ORAL DAILY
Qty: 90 TABLET | Refills: 2 | Status: SHIPPED | OUTPATIENT
Start: 2018-09-12 | End: 2018-10-08

## 2018-09-12 RX ORDER — MELOXICAM 7.5 MG/1
7.5 TABLET ORAL DAILY PRN
Qty: 30 TABLET | Refills: 6 | Status: SHIPPED | OUTPATIENT
Start: 2018-09-12 | End: 2021-03-30 | Stop reason: SDUPTHER

## 2018-09-12 RX ORDER — ATORVASTATIN CALCIUM 20 MG/1
20 TABLET, FILM COATED ORAL NIGHTLY
Qty: 90 TABLET | Refills: 2 | Status: SHIPPED | OUTPATIENT
Start: 2018-09-12 | End: 2019-03-19 | Stop reason: SDUPTHER

## 2018-09-12 NOTE — PROGRESS NOTES
Subjective   Isabel Handy is a 82 y.o. female.     History of Present Illness   The patient is here today to F/U on lab work.  HTN- Pt is doing well with current medication regimen, denies adverse reactions, compliant with medication schedule.   Hypothyroidism- Pt is doing well with current medication regimen, denies adverse reactions, compliant with medication schedule.   HPL- Pt is doing well with current medication regimen, denies adverse reactions, compliant with medication schedule.   To see Dr. Mcmahon in October, she will discuss recheck in carotids with her.     HRT- she is aware of risks and benefits, believes benefits out weigh risks for her.     Recently hospitalized 9/5th for intractable headache. MRI/MRA negative, lab work neg other than abnormal TSH.  Was also hospitalized 8/24th with c/o lightheadedness and felt weak.  CXR, EKG and Lab work WNL.   The following portions of the patient's history were reviewed and updated as appropriate: allergies, current medications, past family history, past medical history, past social history, past surgical history and problem list.    Review of Systems   Constitutional: Negative.    Respiratory: Negative.    Cardiovascular: Positive for palpitations (occ for 1-2 seconds).   Psychiatric/Behavioral: Negative.        Objective   Physical Exam   Constitutional: She appears well-developed and well-nourished.   Neck: Normal range of motion. Neck supple. No thyromegaly present.   Cardiovascular: Normal rate, regular rhythm, normal heart sounds and intact distal pulses.   Occasional extrasystoles are present.   Pulmonary/Chest: Effort normal and breath sounds normal.   Skin: Skin is warm and dry.   Psychiatric: She has a normal mood and affect. Her behavior is normal. Judgment and thought content normal.       Assessment/Plan   There are no diagnoses linked to this encounter.    1. HTN- well controlled, continue same.   2. HPL- will check at next visit.   3.  Intractable headache- described as tension in her neck, she defers further work up as she feels fine since then.   4. Hypothyroidism- TSH up slightly, did decrease to 1/2 tab on sat and Sunday 9 months ago, she is asymptomatic. We will continue same for now, recheck in 6-8 weeks if still high resume whole tab on the weekends.      C-scope- scheduled 9/21st.

## 2018-09-12 NOTE — PATIENT INSTRUCTIONS
Medicare Wellness  Personal Prevention Plan of Service     Date of Office Visit:  2018  Encounter Provider:  ED Morris  Place of Service:  [facility] INTERNAL MEDICINE  Patient Name: Isabel Handy  :  1935    As part of the Medicare Wellness portion of your visit today, we are providing you with this personalized preventive plan of services (PPPS). This plan is based upon recommendations of the [place] Preventive Services Task Force (USPSTF) and the Advisory Committee on Immunization Practices (ACIP).    This lists the preventive care services that should be considered, and provides dates of when you are due. Items listed as completed are up-to-date and do not require any further intervention.    Health Maintenance   Topic Date Due   • TDAP/TD VACCINES (1 - Tdap) 1956   • ZOSTER VACCINE (1 of 2) 1987   • INFLUENZA VACCINE  2018   • DXA SCAN  2018   • MAMMOGRAM  2019   • LIPID PANEL  2019   • MEDICARE ANNUAL WELLNESS  2019   • COLONOSCOPY  2019   • PNEUMOCOCCAL VACCINES (65+ LOW/MEDIUM RISK)  Completed       Orders Placed This Encounter   Procedures   • DEXA Bone Density Axial     Scheduling Instructions:      [facility]     Order Specific Question:   Reason for Exam:     Answer:   post menopause   • TSH Rfx On Abnormal To Free T4     Standing Status:   Future     Standing Expiration Date:   2019       Return in about 6 months (around 3/12/2019).

## 2018-09-12 NOTE — PROGRESS NOTES
QUICK REFERENCE INFORMATION:  The ABCs of the Annual Wellness Visit    Subsequent Medicare Wellness Visit    HEALTH RISK ASSESSMENT    1935    Recent Hospitalizations:  Recently treated at the following:  [facility].  See HPI of today's lab work note.       Current Medical Providers:  Patient Care Team:  Beth Figueredo APRN as PCP - General (Family Medicine)  Beth Figueredo APRN as PCP - Internal Medicine (Internal Medicine)  Beth Figueredo APRN as PCP - Claims Attributed        Smoking Status:  History   Smoking Status   • Former Smoker   Smokeless Tobacco   • Never Used     Comment: years ago - too many years to remember       Alcohol Consumption:  History   Alcohol Use No       Depression Screen:   PHQ-2/PHQ-9 Depression Screening 9/12/2018   Little interest or pleasure in doing things 0   Feeling down, depressed, or hopeless 1   Trouble falling or staying asleep, or sleeping too much 1   Feeling tired or having little energy 0   Poor appetite or overeating 1   Feeling bad about yourself - or that you are a failure or have let yourself or your family down 0   Trouble concentrating on things, such as reading the newspaper or watching television 0   Moving or speaking so slowly that other people could have noticed. Or the opposite - being so fidgety or restless that you have been moving around a lot more than usual 1   Thoughts that you would be better off dead, or of hurting yourself in some way -   Total Score 4   If you checked off any problems, how difficult have these problems made it for you to do your work, take care of things at home, or get along with other people? -       Health Habits and Functional and Cognitive Screening:  Functional & Cognitive Status 9/12/2018   Do you have difficulty preparing food and eating? No   Do you have difficulty bathing yourself, getting dressed or grooming yourself? No   Do you have difficulty using the toilet? No   Do you have difficulty moving around  from place to place? No   Do you have trouble with steps or getting out of a bed or a chair? No   In the past year have you fallen or experienced a near fall? No   Current Diet Well Balanced Diet   Dental Exam Up to date   Eye Exam Up to date   Exercise (times per week) 3 times per week   Current Exercise Activities Include Cardiovasular Workout on Exercise Equipment   Do you need help using the phone?  No   Are you deaf or do you have serious difficulty hearing?  Yes   Do you need help with transportation? No   Do you need help shopping? No   Do you need help preparing meals?  No   Do you need help with housework?  No   Do you need help with laundry? No   Do you need help taking your medications? No   Do you need help managing money? No   Do you ever drive or ride in a car without wearing a seat belt? No   Have you felt unusual stress, anger or loneliness in the last month? Yes   Who do you live with? Alone   If you need help, do you have trouble finding someone available to you? No   Have you been bothered in the last four weeks by sexual problems? No   Do you have difficulty concentrating, remembering or making decisions? No           Does the patient have evidence of cognitive impairment? No    Aspirin use counseling: Taking ASA appropriately as indicated      Recent Lab Results:  CMP:  Lab Results   Component Value Date    GLU 86 04/16/2018    BUN 13 09/06/2018    CREATININE 0.74 09/06/2018    EGFRIFNONA 76 09/06/2018    EGFRIFAFRI 81 04/16/2018    BCR 17.6 09/06/2018     09/06/2018    K 4.2 09/06/2018    CO2 22.5 09/06/2018    CALCIUM 8.3 (L) 09/06/2018    PROTENTOTREF 7.1 04/16/2018    ALBUMIN 4.40 09/05/2018    LABGLOBREF 2.6 04/16/2018    LABIL2 1.7 04/16/2018    BILITOT 0.4 09/05/2018    ALKPHOS 74 09/05/2018    AST 20 09/05/2018    ALT 17 09/05/2018     Lipid Panel:  Lab Results   Component Value Date    TRIG 66 04/16/2018    HDL 90 (H) 04/16/2018    VLDL 13.2 04/16/2018    LDLHDL 0.86 04/16/2018      HbA1c:  Lab Results   Component Value Date    HGBA1C 5.24 04/03/2017       Visual Acuity:  No exam data present    Age-appropriate Screening Schedule:  Refer to the list below for future screening recommendations based on patient's age, sex and/or medical conditions. Orders for these recommended tests are listed in the plan section. The patient has been provided with a written plan.    Health Maintenance   Topic Date Due   • TDAP/TD VACCINES (1 - Tdap) 11/27/1956   • ZOSTER VACCINE (1 of 2) 11/27/1987   • INFLUENZA VACCINE  08/01/2018   • DXA SCAN  11/01/2018   • MAMMOGRAM  01/04/2019   • LIPID PANEL  04/16/2019   • COLONOSCOPY  09/25/2019   • PNEUMOCOCCAL VACCINES (65+ LOW/MEDIUM RISK)  Completed        Subjective   History of Present Illness    Isabel Handy is a 82 y.o. female who presents for an Subsequent Wellness Visit.    The following portions of the patient's history were reviewed and updated as appropriate: allergies, current medications, past family history, past medical history, past social history, past surgical history and problem list.    Outpatient Medications Prior to Visit   Medication Sig Dispense Refill   • amitriptyline (ELAVIL) 25 MG tablet Take 1 tablet by mouth Every Night. (Patient taking differently: Take 12.5 mg by mouth Every Night.) 90 tablet 2   • aspirin 81 MG tablet Take 1 tablet by mouth daily.     • esomeprazole (NexIUM) 20 MG capsule Take 20 mg by mouth every morning before breakfast.     • estradiol (ESTRACE) 0.5 MG tablet Take 0.25 mg by mouth Daily.     • levothyroxine (SYNTHROID, LEVOTHROID) 75 MCG tablet Take 1 tablet by mouth Daily. 90 tablet 2   • Multiple Vitamins-Minerals (MULTIVITAMIN WITH MINERALS) tablet tablet Take 1 tablet by mouth Daily.     • Probiotic Product (SOLUBLE FIBER/PROBIOTICS) chewable tablet Chew 1 each Daily.     • atorvastatin (LIPITOR) 20 MG tablet Take 20 mg by mouth Every Night.     • lisinopril (PRINIVIL,ZESTRIL) 10 MG tablet Take 1 tablet by  "mouth Daily. 90 tablet 2   • meloxicam (MOBIC) 7.5 MG tablet Take 1 tablet by mouth Daily As Needed for Mild Pain . Take with food 30 tablet 3     No facility-administered medications prior to visit.        Patient Active Problem List   Diagnosis   • Gastroesophageal reflux disease   • Menopausal syndrome   • Osteoarthritis of cervical spine   • Carotid atherosclerosis   • Prediabetes   • Low back pain   • Insomnia   • Hypothyroidism   • Essential hypertension   • Hyperlipidemia   • Allergic rhinitis   • Palpitations   • Anxiety   • Vitamin D deficiency   • Lung nodule   • Hyperglycemia   • Occipital pain   • Malaise and fatigue   • Acute nonintractable headache       Advance Care Planning:  has NO advance directive - information provided to the patient today    Identification of Risk Factors:  Risk factors include: weight  and cardiovascular risk.    Review of Systems    Compared to one year ago, the patient feels her physical health is the same.  Compared to one year ago, the patient feels her mental health is the same.    Objective     Physical Exam    Vitals:    09/12/18 1258   BP: 130/66   BP Location: Left arm   Pulse: 74   Temp: 97.9 °F (36.6 °C)   SpO2: 98%   Weight: 74.3 kg (163 lb 12.8 oz)   Height: 160 cm (62.99\")       Patient's Body mass index is 29.02 kg/m². BMI is above normal parameters. Recommendations include: exercise counseling and nutrition counseling.      Assessment/Plan   Patient Self-Management and Personalized Health Advice  The patient has been provided with information about: diet, exercise, weight management, prevention of cardiac or vascular disease and designing advance directives and [id number] services including:   · Advance directive, Bone densitometry screening, Counseling for cardiovascular disease risk reduction, Diabetes screening, see lab orders, Exercise counseling provided, Glaucoma screening recommended, Influenza vaccine, Nutrition counseling provided, shingrix.    Visit " Diagnoses:    ICD-10-CM ICD-9-CM   1. Hyperlipidemia, unspecified hyperlipidemia type E78.5 272.4   2. Osteoarthritis of cervical spine, unspecified spinal osteoarthritis complication status M47.812 721.0   3. Essential hypertension I10 401.9   4. Intractable headache, unspecified chronicity pattern, unspecified headache type R51 784.0   5. Hypothyroidism, unspecified type E03.9 244.9       No orders of the defined types were placed in this encounter.      Outpatient Encounter Prescriptions as of 9/12/2018   Medication Sig Dispense Refill   • amitriptyline (ELAVIL) 25 MG tablet Take 1 tablet by mouth Every Night. (Patient taking differently: Take 12.5 mg by mouth Every Night.) 90 tablet 2   • aspirin 81 MG tablet Take 1 tablet by mouth daily.     • atorvastatin (LIPITOR) 20 MG tablet Take 1 tablet by mouth Every Night. 90 tablet 2   • esomeprazole (NexIUM) 20 MG capsule Take 20 mg by mouth every morning before breakfast.     • estradiol (ESTRACE) 0.5 MG tablet Take 0.25 mg by mouth Daily.     • levothyroxine (SYNTHROID, LEVOTHROID) 75 MCG tablet Take 1 tablet by mouth Daily. 90 tablet 2   • lisinopril (PRINIVIL,ZESTRIL) 10 MG tablet Take 1 tablet by mouth Daily. 90 tablet 2   • Multiple Vitamins-Minerals (MULTIVITAMIN WITH MINERALS) tablet tablet Take 1 tablet by mouth Daily.     • Probiotic Product (SOLUBLE FIBER/PROBIOTICS) chewable tablet Chew 1 each Daily.     • [DISCONTINUED] atorvastatin (LIPITOR) 20 MG tablet Take 20 mg by mouth Every Night.     • [DISCONTINUED] lisinopril (PRINIVIL,ZESTRIL) 10 MG tablet Take 1 tablet by mouth Daily. 90 tablet 2   • meloxicam (MOBIC) 7.5 MG tablet Take 1 tablet by mouth Daily As Needed for Mild Pain . Take with food 30 tablet 6   • [DISCONTINUED] meloxicam (MOBIC) 7.5 MG tablet Take 1 tablet by mouth Daily As Needed for Mild Pain . Take with food 30 tablet 3     No facility-administered encounter medications on file as of 9/12/2018.        Reviewed use of high risk medication  in the elderly: yes  Reviewed for potential of harmful drug interactions in the elderly: not applicable    Follow Up:  No Follow-up on file.   DEXA- ordered  Shingrix -discussed  Flu - pt not ready yet.   Hep A- discussed  An After Visit Summary and PPPS with all of these plans were given to the patient.

## 2018-09-18 DIAGNOSIS — N95.1 MENOPAUSAL SYNDROME: ICD-10-CM

## 2018-09-19 RX ORDER — ESTRADIOL 0.5 MG/1
TABLET ORAL
Qty: 45 TABLET | Refills: 2 | Status: SHIPPED | OUTPATIENT
Start: 2018-09-19 | End: 2019-03-11

## 2018-09-21 ENCOUNTER — ON CAMPUS - OUTPATIENT (OUTPATIENT)
Dept: URBAN - METROPOLITAN AREA HOSPITAL 114 | Facility: HOSPITAL | Age: 83
End: 2018-09-21

## 2018-09-21 ENCOUNTER — ANESTHESIA (OUTPATIENT)
Dept: GASTROENTEROLOGY | Facility: HOSPITAL | Age: 83
End: 2018-09-21

## 2018-09-21 ENCOUNTER — ANESTHESIA EVENT (OUTPATIENT)
Dept: GASTROENTEROLOGY | Facility: HOSPITAL | Age: 83
End: 2018-09-21

## 2018-09-21 ENCOUNTER — HOSPITAL ENCOUNTER (OUTPATIENT)
Facility: HOSPITAL | Age: 83
Setting detail: HOSPITAL OUTPATIENT SURGERY
Discharge: HOME | End: 2018-09-21
Attending: INTERNAL MEDICINE | Admitting: INTERNAL MEDICINE

## 2018-09-21 VITALS
TEMPERATURE: 98.1 F | WEIGHT: 163.5 LBS | SYSTOLIC BLOOD PRESSURE: 143 MMHG | OXYGEN SATURATION: 98 % | HEART RATE: 71 BPM | RESPIRATION RATE: 18 BRPM | HEIGHT: 63 IN | DIASTOLIC BLOOD PRESSURE: 78 MMHG | BODY MASS INDEX: 28.97 KG/M2

## 2018-09-21 DIAGNOSIS — K22.2 ESOPHAGEAL OBSTRUCTION: ICD-10-CM

## 2018-09-21 DIAGNOSIS — R13.14 DYSPHAGIA, PHARYNGOESOPHAGEAL PHASE: ICD-10-CM

## 2018-09-21 DIAGNOSIS — D12.3 BENIGN NEOPLASM OF TRANSVERSE COLON: ICD-10-CM

## 2018-09-21 DIAGNOSIS — Z12.11 ENCOUNTER FOR SCREENING FOR MALIGNANT NEOPLASM OF COLON: ICD-10-CM

## 2018-09-21 DIAGNOSIS — Z80.0 FAMILY HISTORY OF MALIGNANT NEOPLASM OF DIGESTIVE ORGANS: ICD-10-CM

## 2018-09-21 DIAGNOSIS — K62.89 OTHER SPECIFIED DISEASES OF ANUS AND RECTUM: ICD-10-CM

## 2018-09-21 DIAGNOSIS — Z80.0 FAMILY HISTORY OF COLON CANCER: ICD-10-CM

## 2018-09-21 DIAGNOSIS — K44.9 DIAPHRAGMATIC HERNIA WITHOUT OBSTRUCTION OR GANGRENE: ICD-10-CM

## 2018-09-21 PROCEDURE — 45380 COLONOSCOPY AND BIOPSY: CPT | Mod: PT | Performed by: INTERNAL MEDICINE

## 2018-09-21 PROCEDURE — 43450 DILATE ESOPHAGUS 1/MULT PASS: CPT | Performed by: INTERNAL MEDICINE

## 2018-09-21 PROCEDURE — 25010000002 PROPOFOL 10 MG/ML EMULSION: Performed by: ANESTHESIOLOGY

## 2018-09-21 PROCEDURE — 88305 TISSUE EXAM BY PATHOLOGIST: CPT | Performed by: INTERNAL MEDICINE

## 2018-09-21 PROCEDURE — 43235 EGD DIAGNOSTIC BRUSH WASH: CPT | Performed by: INTERNAL MEDICINE

## 2018-09-21 RX ORDER — PROPOFOL 10 MG/ML
VIAL (ML) INTRAVENOUS CONTINUOUS PRN
Status: DISCONTINUED | OUTPATIENT
Start: 2018-09-21 | End: 2018-09-21 | Stop reason: SURG

## 2018-09-21 RX ORDER — PROPOFOL 10 MG/ML
VIAL (ML) INTRAVENOUS AS NEEDED
Status: DISCONTINUED | OUTPATIENT
Start: 2018-09-21 | End: 2018-09-21 | Stop reason: SURG

## 2018-09-21 RX ORDER — SODIUM CHLORIDE, SODIUM LACTATE, POTASSIUM CHLORIDE, CALCIUM CHLORIDE 600; 310; 30; 20 MG/100ML; MG/100ML; MG/100ML; MG/100ML
1000 INJECTION, SOLUTION INTRAVENOUS CONTINUOUS
Status: DISCONTINUED | OUTPATIENT
Start: 2018-09-21 | End: 2018-09-21 | Stop reason: HOSPADM

## 2018-09-21 RX ORDER — LIDOCAINE HYDROCHLORIDE 20 MG/ML
INJECTION, SOLUTION INFILTRATION; PERINEURAL AS NEEDED
Status: DISCONTINUED | OUTPATIENT
Start: 2018-09-21 | End: 2018-09-21 | Stop reason: SURG

## 2018-09-21 RX ADMIN — PROPOFOL 100 MG: 10 INJECTION, EMULSION INTRAVENOUS at 08:49

## 2018-09-21 RX ADMIN — LIDOCAINE HYDROCHLORIDE 40 MG: 20 INJECTION, SOLUTION INFILTRATION; PERINEURAL at 08:48

## 2018-09-21 RX ADMIN — SODIUM CHLORIDE, POTASSIUM CHLORIDE, SODIUM LACTATE AND CALCIUM CHLORIDE 1000 ML: 600; 310; 30; 20 INJECTION, SOLUTION INTRAVENOUS at 07:49

## 2018-09-21 RX ADMIN — SODIUM CHLORIDE, POTASSIUM CHLORIDE, SODIUM LACTATE AND CALCIUM CHLORIDE: 600; 310; 30; 20 INJECTION, SOLUTION INTRAVENOUS at 08:35

## 2018-09-21 RX ADMIN — PROPOFOL 140 MCG/KG/MIN: 10 INJECTION, EMULSION INTRAVENOUS at 08:49

## 2018-09-21 NOTE — H&P
Patient Care Team:  Beth Figueredo APRN as PCP - General (Family Medicine)  Beth Figueredo APRN as PCP - Internal Medicine (Internal Medicine)  Beth Figueredo APRN as PCP - Claims Attributed    Chief complaint difficulty swallowing,  Family history of colon cancer     Subjective     History of Present Illness  Patient has food hesitate in the esophagus and needs liquid to wash it down.   Review of Systems   HENT: Positive for trouble swallowing.    All other systems reviewed and are negative.       Past Medical History:   Diagnosis Date   • Arrhythmia    • Hyperlipidemia    • Hypertension    • Hypothyroidism    • Subarachnoid hemorrhage (CMS/HCC)      Past Surgical History:   Procedure Laterality Date   • BLEPHAROPLASTY Bilateral    • BREAST IMPLANT SURGERY     • BREAST SURGERY      to remove cysts   • COLONOSCOPY     • ENDOSCOPY     • FOOT SURGERY Bilateral    • HYSTERECTOMY       Family History   Problem Relation Age of Onset   • Hypertension Mother    • Cancer Mother         Colon   • Heart disease Mother    • Arthritis Mother    • Hypertension Father    • Cancer Father         Prostate   • Heart disease Father    • COPD Sister    • Hypertension Brother    • Heart disease Brother          of heart attack (2 brothers)   • Tuberculosis Brother    • Diabetes Paternal Aunt      Social History   Substance Use Topics   • Smoking status: Former Smoker   • Smokeless tobacco: Never Used      Comment: years ago - too many years to remember   • Alcohol use No     Prescriptions Prior to Admission   Medication Sig Dispense Refill Last Dose   • estradiol (ESTRACE) 0.5 MG tablet TAKE 1/2 TABLET BY MOUTH EVERY DAY 45 tablet 2 Past Week at Unknown time   • lisinopril (PRINIVIL,ZESTRIL) 10 MG tablet Take 1 tablet by mouth Daily. 90 tablet 2 2018 at Unknown time   • meloxicam (MOBIC) 7.5 MG tablet Take 1 tablet by mouth Daily As Needed for Mild Pain . Take with food 30 tablet 6 Past Month at Unknown time   • Multiple  Vitamins-Minerals (MULTIVITAMIN WITH MINERALS) tablet tablet Take 1 tablet by mouth Daily.   Past Week at Unknown time   • Probiotic Product (SOLUBLE FIBER/PROBIOTICS) chewable tablet Chew 1 each Daily.   Past Week at Unknown time   • amitriptyline (ELAVIL) 25 MG tablet Take 1 tablet by mouth Every Night. (Patient taking differently: Take 12.5 mg by mouth Every Night.) 90 tablet 2 9/19/2018   • aspirin 81 MG tablet Take 1 tablet by mouth daily.   9/16/2018   • atorvastatin (LIPITOR) 20 MG tablet Take 1 tablet by mouth Every Night. 90 tablet 2 9/19/2018   • esomeprazole (NexIUM) 20 MG capsule Take 20 mg by mouth every morning before breakfast.   9/19/2018   • levothyroxine (SYNTHROID, LEVOTHROID) 75 MCG tablet Take 1 tablet by mouth Daily. 90 tablet 2 9/19/2018     Allergies:  Gatifloxacin and Rosuvastatin    Objective      Vital Signs  Temp:  [97.8 °F (36.6 °C)] 97.8 °F (36.6 °C)  Heart Rate:  [71] 71  Resp:  [16] 16  BP: (149)/(75) 149/75    Physical Exam   Constitutional: She is oriented to person, place, and time. She appears well-developed and well-nourished.   HENT:   Mouth/Throat: Oropharynx is clear and moist.   Eyes: Conjunctivae are normal.   Neck: Neck supple.   Cardiovascular: Normal rate and regular rhythm.    Pulmonary/Chest: Effort normal and breath sounds normal.   Abdominal: Soft. Bowel sounds are normal.   Neurological: She is alert and oriented to person, place, and time.   Skin: Skin is warm and dry.   Psychiatric: She has a normal mood and affect.       Results Review:   I reviewed the patient's new clinical results.      Assessment/Plan     Active Problems:    * No active hospital problems. *      Assessment:  (Solid food dysphagia  Family history of colon cancer ).     Plan:   (Upper and lower tract endoscopy, risks, alternatives and benefits dicussed  with patient and patient is agreeable to proceed.  Plan an esophageal dilation as well. ).       I discussed the patients findings and my  recommendations with patient and nursing staff    Roly Be MD  09/21/18  8:46 AM

## 2018-09-21 NOTE — ANESTHESIA PREPROCEDURE EVALUATION
Anesthesia Evaluation     NPO Solid Status: > 8 hours  NPO Liquid Status: > 8 hours           Airway   Mallampati: II  Dental      Comment: Retainer    Pulmonary    Cardiovascular     ECG reviewed    (+) hypertension, hyperlipidemia,  carotid artery disease      Neuro/Psych  (+) psychiatric history,     GI/Hepatic/Renal/Endo      Musculoskeletal     Abdominal    Substance History      OB/GYN          Other                        Anesthesia Plan    ASA 3     MAC     intravenous induction

## 2018-09-21 NOTE — ANESTHESIA POSTPROCEDURE EVALUATION
"Patient: Isabel Hnady    Procedure Summary     Date:  09/21/18 Room / Location:  [facility] ENDOSCOPY 4 /  [facility]    Anesthesia Start:  0846 Anesthesia Stop:  0929    Procedures:       COLONOSCOPY into cecum and T.I. with cold biopsy polypectomy (N/A )      ESOPHAGOGASTRODUODENOSCOPY with esophageal dilation #54 Castellanos (N/A Esophagus) Diagnosis:      Surgeon:  oRly Be MD Provider:  Bartolome Negron MD    Anesthesia Type:  MAC ASA Status:  3          Anesthesia Type: MAC  Last vitals  BP   143/78 (09/21/18 0951)   Temp   36.7 °C (98.1 °F) (09/21/18 0938)   Pulse   71 (09/21/18 0951)   Resp   18 (09/21/18 0951)     SpO2   98 % (09/21/18 0951)     Post Anesthesia Care and Evaluation    Patient location during evaluation: bedside  Patient participation: complete - patient participated  Level of consciousness: awake and alert  Pain management: adequate  Anesthetic complications: No anesthetic complications    Cardiovascular status: acceptable  Respiratory status: acceptable  Hydration status: acceptable    Comments: /78   Pulse 71   Temp 36.7 °C (98.1 °F)   Resp 18   Ht 160 cm (63\")   Wt 74.2 kg (163 lb 8 oz)   SpO2 98%   BMI 28.96 kg/m²         "

## 2018-09-24 LAB
CYTO UR: NORMAL
LAB AP CASE REPORT: NORMAL
PATH REPORT.FINAL DX SPEC: NORMAL
PATH REPORT.GROSS SPEC: NORMAL

## 2018-10-08 ENCOUNTER — OFFICE VISIT (OUTPATIENT)
Dept: CARDIOLOGY | Facility: CLINIC | Age: 83
End: 2018-10-08

## 2018-10-08 VITALS
WEIGHT: 162 LBS | HEIGHT: 62 IN | HEART RATE: 76 BPM | DIASTOLIC BLOOD PRESSURE: 70 MMHG | SYSTOLIC BLOOD PRESSURE: 136 MMHG | BODY MASS INDEX: 29.81 KG/M2

## 2018-10-08 DIAGNOSIS — I49.3 PVC'S (PREMATURE VENTRICULAR CONTRACTIONS): Primary | ICD-10-CM

## 2018-10-08 DIAGNOSIS — E78.5 HYPERLIPIDEMIA, UNSPECIFIED HYPERLIPIDEMIA TYPE: ICD-10-CM

## 2018-10-08 DIAGNOSIS — I65.23 BILATERAL CAROTID ARTERY STENOSIS: ICD-10-CM

## 2018-10-08 DIAGNOSIS — I10 ESSENTIAL HYPERTENSION: ICD-10-CM

## 2018-10-08 PROCEDURE — 99214 OFFICE O/P EST MOD 30 MIN: CPT | Performed by: INTERNAL MEDICINE

## 2018-10-08 PROCEDURE — 93000 ELECTROCARDIOGRAM COMPLETE: CPT | Performed by: INTERNAL MEDICINE

## 2018-10-08 RX ORDER — LISINOPRIL 20 MG/1
20 TABLET ORAL DAILY
Qty: 90 TABLET | Refills: 3 | Status: SHIPPED | OUTPATIENT
Start: 2018-10-08 | End: 2020-02-18

## 2018-10-08 NOTE — PROGRESS NOTES
Date of Office Visit: 10/08/2018  Encounter Provider: Jannette Mcmahon MD  Place of Service: [facility] CARDIOLOGY  Patient Name: Isabel Handy  :1935      Patient ID:  Isabel Handy is a 82 y.o. female is here for  followup for PVCs.         History of Present Illness    She is followed for PVCs.      She woke with these on the morning of 3/15/2016.  She was sent to the chest pain center.  Her troponin was negative.  Her TSH was well-controlled.  They continued to cause issues for her as well some intermittent chest pain and pressure.  Because of her symptoms she was sent for dobutamine stress echocardiogram which was done 3/16/2016.  This showed no evidence of ischemia.  Her aortic valve had mild calcification on it.  Her baseline ejection fraction was 64%.  There was another valve disease.  She was sizes were normal.  He was a normal stress echocardiogram.  She also had a Holter recording done 3/15/2016.  This showed 100 PVCs per hour.  There were rare PACs.     She is a history of carotid stenosis diagnosed in the past.  She has thyroid disease which is normally control.  She is very strong family history of cardiac disease.  Both of her parents of myocardial infarctions and she's had 2 brothers with myocardial infarctions as well.    She had laboratory values in 2018 showing HDL 90, LDL 78.  labs done 2018 showing mildly high TSH of 5.01, normal BMP and CBC.    She is overall doing well.  She has some chronic lower extremity edema.  Her blood pressure has been running a bit high at home.  She checked since been 140s over 70s.  She's had no dizziness or syncope.  She had 2 visits to the emergency department where she just felt fatigued and weak and somewhat nauseated.  She was found to have an esophageal stricture and had normal normal colonoscopy.  The stricture was dilated and she's felt much better since then.  She has no exertional chest  tightness or pressure.  She's had no PND or orthopnea.    Carotid study 5/2016 with carotid atheromatous plaque and no significant stenosis.       Past Medical History:   Diagnosis Date   • Arrhythmia    • Hyperlipidemia    • Hypertension    • Hypothyroidism    • Subarachnoid hemorrhage (CMS/HCC)          Past Surgical History:   Procedure Laterality Date   • BLEPHAROPLASTY Bilateral    • BREAST IMPLANT SURGERY     • BREAST SURGERY      to remove cysts   • COLONOSCOPY     • COLONOSCOPY N/A 9/21/2018    Procedure: COLONOSCOPY into cecum and T.I. with cold biopsy polypectomy;  Surgeon: Roly Be MD;  Location: [facility] ENDOSCOPY;  Service: Gastroenterology   • ENDOSCOPY     • ENDOSCOPY N/A 9/21/2018    Procedure: ESOPHAGOGASTRODUODENOSCOPY with esophageal dilation #54 Castellanos;  Surgeon: Roly Be MD;  Location: [facility] ENDOSCOPY;  Service: Gastroenterology   • FOOT SURGERY Bilateral    • HYSTERECTOMY         Current Outpatient Prescriptions on File Prior to Visit   Medication Sig Dispense Refill   • amitriptyline (ELAVIL) 25 MG tablet Take 1 tablet by mouth Every Night. (Patient taking differently: Take 12.5 mg by mouth Every Night.) 90 tablet 2   • aspirin 81 MG tablet Take 1 tablet by mouth daily.     • atorvastatin (LIPITOR) 20 MG tablet Take 1 tablet by mouth Every Night. 90 tablet 2   • esomeprazole (NexIUM) 20 MG capsule Take 20 mg by mouth every morning before breakfast.     • estradiol (ESTRACE) 0.5 MG tablet TAKE 1/2 TABLET BY MOUTH EVERY DAY 45 tablet 2   • levothyroxine (SYNTHROID, LEVOTHROID) 75 MCG tablet Take 1 tablet by mouth Daily. 90 tablet 2   • lisinopril (PRINIVIL,ZESTRIL) 10 MG tablet Take 1 tablet by mouth Daily. 90 tablet 2   • meloxicam (MOBIC) 7.5 MG tablet Take 1 tablet by mouth Daily As Needed for Mild Pain . Take with food 30 tablet 6   • Multiple Vitamins-Minerals (MULTIVITAMIN WITH MINERALS) tablet tablet Take 1 tablet by mouth Daily.     • Probiotic Product (SOLUBLE  "FIBER/PROBIOTICS) chewable tablet Chew 1 each Daily.       No current facility-administered medications on file prior to visit.        Social History     Social History   • Marital status:      Spouse name: N/A   • Number of children: N/A   • Years of education: N/A     Occupational History   • Not on file.     Social History Main Topics   • Smoking status: Former Smoker   • Smokeless tobacco: Never Used      Comment: years ago - too many years to remember   • Alcohol use No   • Drug use: No   • Sexual activity: Defer     Other Topics Concern   • Not on file     Social History Narrative   • No narrative on file           Review of Systems   Constitution: Negative.   HENT: Negative for congestion.    Eyes: Negative for vision loss in left eye and vision loss in right eye.   Respiratory: Negative.  Negative for cough, hemoptysis, shortness of breath, sleep disturbances due to breathing, snoring, sputum production and wheezing.    Endocrine: Negative.    Hematologic/Lymphatic: Negative.    Skin: Negative for poor wound healing and rash.   Musculoskeletal: Negative for falls, gout, muscle cramps and myalgias.   Gastrointestinal: Negative for abdominal pain, diarrhea, dysphagia, hematemesis, melena, nausea and vomiting.   Neurological: Negative for excessive daytime sleepiness, dizziness, headaches, light-headedness, loss of balance, seizures and vertigo.   Psychiatric/Behavioral: Negative for depression and substance abuse. The patient is not nervous/anxious.        Procedures    ECG 12 Lead  Date/Time: 10/8/2018 2:17 PM  Performed by: CLAUS NICOLAS  Authorized by: CLAUS NICOLAS   Comparison: compared with previous ECG   Similar to previous ECG  Rhythm: sinus rhythm  Clinical impression: normal ECG                Objective:      Vitals:    10/08/18 1403   BP: 136/70   BP Location: Left arm   Patient Position: Sitting   Pulse: 76   Weight: 73.5 kg (162 lb)   Height: 157.5 cm (62\")     Body mass index " is 29.63 kg/m².    Physical Exam   Constitutional: She is oriented to person, place, and time. She appears well-developed and well-nourished. No distress.   HENT:   Head: Normocephalic and atraumatic.   Eyes: Conjunctivae are normal. No scleral icterus.   Neck: Neck supple. No JVD present. Carotid bruit is not present. No thyromegaly present.   Cardiovascular: Normal rate, regular rhythm, S1 normal, S2 normal, normal heart sounds and intact distal pulses.   No extrasystoles are present. PMI is not displaced.  Exam reveals no gallop.    No murmur heard.  Pulses:       Carotid pulses are 2+ on the right side, and 2+ on the left side with bruit.       Radial pulses are 2+ on the right side, and 2+ on the left side.        Dorsalis pedis pulses are 2+ on the right side, and 2+ on the left side.        Posterior tibial pulses are 2+ on the right side, and 2+ on the left side.   Pulmonary/Chest: Effort normal and breath sounds normal. No respiratory distress. She has no wheezes. She has no rhonchi. She has no rales. She exhibits no tenderness.   Abdominal: Soft. Bowel sounds are normal. She exhibits no distension, no abdominal bruit and no mass. There is no tenderness.   Musculoskeletal: She exhibits no edema or deformity.   Lymphadenopathy:     She has no cervical adenopathy.   Neurological: She is alert and oriented to person, place, and time. No cranial nerve deficit.   Skin: Skin is warm and dry. No rash noted. She is not diaphoretic. No cyanosis. No pallor. Nails show no clubbing.   Psychiatric: She has a normal mood and affect. Judgment normal.   Vitals reviewed.      Lab Review:       Assessment:      Diagnosis Plan   1. PVC's (premature ventricular contractions)     2. Essential hypertension     3. Hyperlipidemia, unspecified hyperlipidemia type       1. PVCs.  She doesn't have any today.  They're intermittent.  2. History of carotid stenosis a left carotid bruit.  Set up Carotid duplex study  3. Family history  cardiovascular disease.    4. Lower Extremity edema.  This is noncardiac.  5. Sedentary, encouraged exercise.      Plan:       See maral in 1 year, set up carotid study and increase lisinopril to 20mg daily.

## 2018-10-11 ENCOUNTER — HOSPITAL ENCOUNTER (OUTPATIENT)
Dept: BONE DENSITY | Facility: HOSPITAL | Age: 83
Discharge: HOME | End: 2018-10-11
Admitting: NURSE PRACTITIONER

## 2018-10-11 PROCEDURE — 77080 DXA BONE DENSITY AXIAL: CPT

## 2018-10-15 ENCOUNTER — HOSPITAL ENCOUNTER (OUTPATIENT)
Dept: CARDIOLOGY | Facility: HOSPITAL | Age: 83
Discharge: HOME | End: 2018-10-15
Attending: INTERNAL MEDICINE | Admitting: INTERNAL MEDICINE

## 2018-10-15 DIAGNOSIS — I65.23 BILATERAL CAROTID ARTERY STENOSIS: ICD-10-CM

## 2018-10-15 PROCEDURE — 93880 EXTRACRANIAL BILAT STUDY: CPT | Performed by: INTERNAL MEDICINE

## 2018-10-15 PROCEDURE — 93880 EXTRACRANIAL BILAT STUDY: CPT

## 2018-10-16 ENCOUNTER — FLU SHOT (OUTPATIENT)
Dept: INTERNAL MEDICINE | Facility: CLINIC | Age: 83
End: 2018-10-16

## 2018-10-16 LAB
BH CV XLRA MEAS LEFT DIST CCA EDV: 26 CM/SEC
BH CV XLRA MEAS LEFT DIST CCA PSV: 78.8 CM/SEC
BH CV XLRA MEAS LEFT DIST ICA EDV: -38.5 CM/SEC
BH CV XLRA MEAS LEFT DIST ICA PSV: -104.6 CM/SEC
BH CV XLRA MEAS LEFT ICA/CCA RATIO: 1.33
BH CV XLRA MEAS LEFT MID CCA EDV: 24.3 CM/SEC
BH CV XLRA MEAS LEFT MID CCA PSV: 91.8 CM/SEC
BH CV XLRA MEAS LEFT MID ICA EDV: -24.9 CM/SEC
BH CV XLRA MEAS LEFT MID ICA PSV: -66.2 CM/SEC
BH CV XLRA MEAS LEFT PROX CCA EDV: 23 CM/SEC
BH CV XLRA MEAS LEFT PROX CCA PSV: 80 CM/SEC
BH CV XLRA MEAS LEFT PROX ECA PSV: -85.8 CM/SEC
BH CV XLRA MEAS LEFT PROX ICA EDV: 26 CM/SEC
BH CV XLRA MEAS LEFT PROX ICA PSV: 75.4 CM/SEC
BH CV XLRA MEAS LEFT PROX SCLA PSV: 103.3 CM/SEC
BH CV XLRA MEAS LEFT VERTEBRAL A PSV: -52 CM/SEC
BH CV XLRA MEAS RIGHT DIST CCA EDV: 19.6 CM/SEC
BH CV XLRA MEAS RIGHT DIST CCA PSV: 72.2 CM/SEC
BH CV XLRA MEAS RIGHT DIST ICA EDV: -26.7 CM/SEC
BH CV XLRA MEAS RIGHT DIST ICA PSV: -72.2 CM/SEC
BH CV XLRA MEAS RIGHT ICA/CCA RATIO: 1
BH CV XLRA MEAS RIGHT MID CCA EDV: 16.1 CM/SEC
BH CV XLRA MEAS RIGHT MID CCA PSV: 68.2 CM/SEC
BH CV XLRA MEAS RIGHT MID ICA EDV: -18.2 CM/SEC
BH CV XLRA MEAS RIGHT MID ICA PSV: -64.5 CM/SEC
BH CV XLRA MEAS RIGHT PROX CCA EDV: 17 CM/SEC
BH CV XLRA MEAS RIGHT PROX CCA PSV: 79.9 CM/SEC
BH CV XLRA MEAS RIGHT PROX ECA PSV: -68.1 CM/SEC
BH CV XLRA MEAS RIGHT PROX ICA EDV: 23.8 CM/SEC
BH CV XLRA MEAS RIGHT PROX ICA PSV: 71.5 CM/SEC
BH CV XLRA MEAS RIGHT PROX SCLA PSV: 173.1 CM/SEC
BH CV XLRA MEAS RIGHT VERTEBRAL A PSV: -44.8 CM/SEC

## 2018-10-16 PROCEDURE — G0008 ADMIN INFLUENZA VIRUS VAC: HCPCS | Performed by: NURSE PRACTITIONER

## 2018-10-16 PROCEDURE — 90662 IIV NO PRSV INCREASED AG IM: CPT | Performed by: NURSE PRACTITIONER

## 2018-10-22 DIAGNOSIS — E03.9 HYPOTHYROIDISM, UNSPECIFIED TYPE: ICD-10-CM

## 2018-10-22 RX ORDER — LEVOTHYROXINE SODIUM 0.07 MG/1
TABLET ORAL
Qty: 90 TABLET | Refills: 0 | Status: SHIPPED | OUTPATIENT
Start: 2018-10-22 | End: 2019-01-23 | Stop reason: SDUPTHER

## 2018-10-24 ENCOUNTER — RESULTS ENCOUNTER (OUTPATIENT)
Dept: INTERNAL MEDICINE | Facility: CLINIC | Age: 83
End: 2018-10-24

## 2018-10-24 DIAGNOSIS — E03.9 HYPOTHYROIDISM, UNSPECIFIED TYPE: ICD-10-CM

## 2018-10-25 DIAGNOSIS — G47.00 INSOMNIA, UNSPECIFIED TYPE: ICD-10-CM

## 2018-10-26 RX ORDER — AMITRIPTYLINE HYDROCHLORIDE 25 MG/1
25 TABLET, FILM COATED ORAL NIGHTLY
Qty: 90 TABLET | Refills: 1 | Status: SHIPPED | OUTPATIENT
Start: 2018-10-26 | End: 2019-09-19 | Stop reason: SDUPTHER

## 2018-11-20 DIAGNOSIS — K21.9 GASTROESOPHAGEAL REFLUX DISEASE, ESOPHAGITIS PRESENCE NOT SPECIFIED: ICD-10-CM

## 2018-11-20 DIAGNOSIS — R11.0 NAUSEA: ICD-10-CM

## 2018-11-20 LAB — TSH SERPL DL<=0.005 MIU/L-ACNC: 0.9 MIU/ML (ref 0.27–4.2)

## 2018-11-20 RX ORDER — PROMETHAZINE HYDROCHLORIDE 25 MG/1
25 TABLET ORAL DAILY PRN
Qty: 30 TABLET | Refills: 0 | Status: SHIPPED | OUTPATIENT
Start: 2018-11-20 | End: 2019-09-19 | Stop reason: SDUPTHER

## 2018-11-20 RX ORDER — PROMETHAZINE HYDROCHLORIDE 25 MG/1
25 TABLET ORAL DAILY PRN
Qty: 30 TABLET | Refills: 0 | Status: SHIPPED | OUTPATIENT
Start: 2018-11-20 | End: 2018-11-20 | Stop reason: SDUPTHER

## 2019-01-23 ENCOUNTER — OFFICE VISIT (OUTPATIENT)
Dept: INTERNAL MEDICINE | Facility: CLINIC | Age: 84
End: 2019-01-23

## 2019-01-23 VITALS
SYSTOLIC BLOOD PRESSURE: 130 MMHG | DIASTOLIC BLOOD PRESSURE: 70 MMHG | WEIGHT: 163.4 LBS | TEMPERATURE: 98.1 F | BODY MASS INDEX: 30.07 KG/M2 | HEART RATE: 97 BPM | OXYGEN SATURATION: 98 % | HEIGHT: 62 IN

## 2019-01-23 DIAGNOSIS — N64.59 ABNORMAL BREAST EXAM: ICD-10-CM

## 2019-01-23 DIAGNOSIS — L02.91 ABSCESS: ICD-10-CM

## 2019-01-23 DIAGNOSIS — N64.4 BREAST TENDERNESS: Primary | ICD-10-CM

## 2019-01-23 PROCEDURE — 99214 OFFICE O/P EST MOD 30 MIN: CPT | Performed by: NURSE PRACTITIONER

## 2019-01-23 NOTE — PROGRESS NOTES
Subjective   Isabel Handy is a 83 y.o. female.     History of Present Illness   The patient is here today with c/o breast tenderness on the left side. She is due for her mammo and was told to come here for evaluation. She has had a lump in her left axillary area for months.   Hx of right breast ca with breast implant and reconstruction.  Patient reports right breast with nipple changes present for several years.  The following portions of the patient's history were reviewed and updated as appropriate: allergies, current medications, past family history, past medical history, past social history, past surgical history and problem list.    Review of Systems   Constitutional: Negative.    Respiratory: Negative.    Cardiovascular: Negative.    Psychiatric/Behavioral: Negative for dysphoric mood. The patient is nervous/anxious.        Objective   Physical Exam   Constitutional: She appears well-developed and well-nourished.   Neck: Normal range of motion. Neck supple. No thyromegaly present.   Cardiovascular: Normal rate, regular rhythm, normal heart sounds and intact distal pulses.   Pulmonary/Chest: Effort normal and breath sounds normal. Right breast exhibits inverted nipple, skin change and tenderness. Right breast exhibits no mass and no nipple discharge. Left breast exhibits tenderness. Left breast exhibits no inverted nipple, no mass and no nipple discharge. Breasts are asymmetrical.   Breast tenderness bilaterally on lateral sides, no lumps or masses felt    Patient does have an abscess in left axillary region, approximately 1 cm    Scarring and nipple changes on left breast       Skin: Skin is warm and dry.   Psychiatric: She has a normal mood and affect. Her behavior is normal. Judgment and thought content normal.       Assessment/Plan   There are no diagnoses linked to this encounter.    1. Breast tenderness- check diagnostic mammo with US PRN  2. Abnormal breast exam- right breast with nipple changes,  check US  3. Abscess- warm compresses, if continues may need antibiotic vs I&D

## 2019-02-05 DIAGNOSIS — E03.9 HYPOTHYROIDISM, UNSPECIFIED TYPE: ICD-10-CM

## 2019-02-05 RX ORDER — LEVOTHYROXINE SODIUM 0.07 MG/1
TABLET ORAL
Qty: 90 TABLET | Refills: 0 | Status: SHIPPED | OUTPATIENT
Start: 2019-02-05 | End: 2019-03-19 | Stop reason: SDUPTHER

## 2019-02-13 ENCOUNTER — APPOINTMENT (OUTPATIENT)
Dept: WOMENS IMAGING | Facility: HOSPITAL | Age: 84
End: 2019-02-13

## 2019-02-13 PROCEDURE — 77065 DX MAMMO INCL CAD UNI: CPT | Performed by: RADIOLOGY

## 2019-02-13 PROCEDURE — 76641 ULTRASOUND BREAST COMPLETE: CPT | Performed by: RADIOLOGY

## 2019-02-13 PROCEDURE — G0279 TOMOSYNTHESIS, MAMMO: HCPCS | Performed by: RADIOLOGY

## 2019-02-13 PROCEDURE — MDREVSPNEW: Performed by: RADIOLOGY

## 2019-02-14 DIAGNOSIS — N95.1 MENOPAUSAL SYNDROME: ICD-10-CM

## 2019-02-14 RX ORDER — ESTRADIOL 0.5 MG/1
TABLET ORAL
Qty: 45 TABLET | Refills: 2 | OUTPATIENT
Start: 2019-02-14

## 2019-03-11 ENCOUNTER — TELEPHONE (OUTPATIENT)
Dept: INTERNAL MEDICINE | Facility: CLINIC | Age: 84
End: 2019-03-11

## 2019-03-11 NOTE — TELEPHONE ENCOUNTER
PT IS WILLING TO GO .    ----- Message from Michaela Alcocer sent at 3/11/2019  1:30 PM EDT -----  Patient wants to know if she can be called in something for a UTI. She says her urine is discolored and she has pain with urination. We do not have any appointments for today and ba does not have anything tomorrow and she would rather see ba if anything. I told her she would need to be seen but she said it is not convenient since she lives in [place].

## 2019-03-14 DIAGNOSIS — E78.5 HYPERLIPIDEMIA, UNSPECIFIED HYPERLIPIDEMIA TYPE: ICD-10-CM

## 2019-03-14 DIAGNOSIS — E03.9 HYPOTHYROIDISM, UNSPECIFIED TYPE: ICD-10-CM

## 2019-03-14 DIAGNOSIS — I10 ESSENTIAL HYPERTENSION: Primary | ICD-10-CM

## 2019-03-15 LAB
ALBUMIN SERPL-MCNC: 4.4 G/DL (ref 3.5–5.2)
ALBUMIN/GLOB SERPL: 1.7 G/DL
ALP SERPL-CCNC: 79 U/L (ref 39–117)
ALT SERPL-CCNC: 11 U/L (ref 1–33)
AST SERPL-CCNC: 10 U/L (ref 1–32)
BASOPHILS # BLD AUTO: 0.07 10*3/MM3 (ref 0–0.2)
BASOPHILS NFR BLD AUTO: 0.7 % (ref 0–1.5)
BILIRUB SERPL-MCNC: 0.4 MG/DL (ref 0.1–1.2)
BUN SERPL-MCNC: 13 MG/DL (ref 8–23)
BUN/CREAT SERPL: 16 (ref 7–25)
CALCIUM SERPL-MCNC: 9.9 MG/DL (ref 8.6–10.5)
CHLORIDE SERPL-SCNC: 99 MMOL/L (ref 98–107)
CHOLEST SERPL-MCNC: 186 MG/DL (ref 0–200)
CO2 SERPL-SCNC: 25.4 MMOL/L (ref 22–29)
CREAT SERPL-MCNC: 0.81 MG/DL (ref 0.57–1)
EOSINOPHIL # BLD AUTO: 0.42 10*3/MM3 (ref 0–0.4)
EOSINOPHIL NFR BLD AUTO: 4.2 % (ref 0.3–6.2)
ERYTHROCYTE [DISTWIDTH] IN BLOOD BY AUTOMATED COUNT: 13.4 % (ref 12.3–15.4)
GLOBULIN SER CALC-MCNC: 2.6 GM/DL
GLUCOSE SERPL-MCNC: 95 MG/DL (ref 65–99)
HCT VFR BLD AUTO: 51.1 % (ref 34–46.6)
HDLC SERPL-MCNC: 94 MG/DL (ref 40–60)
HGB BLD-MCNC: 15.8 G/DL (ref 12–15.9)
IMM GRANULOCYTES # BLD AUTO: 0.05 10*3/MM3 (ref 0–0.05)
IMM GRANULOCYTES NFR BLD AUTO: 0.5 % (ref 0–0.5)
LDLC SERPL CALC-MCNC: 77 MG/DL (ref 0–100)
LDLC/HDLC SERPL: 0.82 {RATIO}
LYMPHOCYTES # BLD AUTO: 3.82 10*3/MM3 (ref 0.7–3.1)
LYMPHOCYTES NFR BLD AUTO: 38.1 % (ref 19.6–45.3)
MCH RBC QN AUTO: 29.7 PG (ref 26.6–33)
MCHC RBC AUTO-ENTMCNC: 30.9 G/DL (ref 31.5–35.7)
MCV RBC AUTO: 96.1 FL (ref 79–97)
MONOCYTES # BLD AUTO: 0.77 10*3/MM3 (ref 0.1–0.9)
MONOCYTES NFR BLD AUTO: 7.7 % (ref 5–12)
NEUTROPHILS # BLD AUTO: 4.89 10*3/MM3 (ref 1.4–7)
NEUTROPHILS NFR BLD AUTO: 48.8 % (ref 42.7–76)
NRBC BLD AUTO-RTO: 0 /100 WBC (ref 0–0)
PLATELET # BLD AUTO: 284 10*3/MM3 (ref 140–450)
POTASSIUM SERPL-SCNC: 4.9 MMOL/L (ref 3.5–5.2)
PROT SERPL-MCNC: 7 G/DL (ref 6–8.5)
RBC # BLD AUTO: 5.32 10*6/MM3 (ref 3.77–5.28)
SODIUM SERPL-SCNC: 138 MMOL/L (ref 136–145)
TRIGL SERPL-MCNC: 76 MG/DL (ref 0–150)
TSH SERPL DL<=0.005 MIU/L-ACNC: 1.45 MIU/ML (ref 0.27–4.2)
VLDLC SERPL CALC-MCNC: 15.2 MG/DL (ref 5–40)
WBC # BLD AUTO: 10.02 10*3/MM3 (ref 3.4–10.8)

## 2019-03-19 ENCOUNTER — OFFICE VISIT (OUTPATIENT)
Dept: INTERNAL MEDICINE | Facility: CLINIC | Age: 84
End: 2019-03-19

## 2019-03-19 VITALS
HEART RATE: 79 BPM | DIASTOLIC BLOOD PRESSURE: 68 MMHG | SYSTOLIC BLOOD PRESSURE: 120 MMHG | TEMPERATURE: 98 F | BODY MASS INDEX: 29.39 KG/M2 | WEIGHT: 165.9 LBS | HEIGHT: 63 IN | OXYGEN SATURATION: 98 %

## 2019-03-19 DIAGNOSIS — E03.9 HYPOTHYROIDISM, UNSPECIFIED TYPE: ICD-10-CM

## 2019-03-19 DIAGNOSIS — E66.3 OVERWEIGHT (BMI 25.0-29.9): ICD-10-CM

## 2019-03-19 DIAGNOSIS — E78.5 HYPERLIPIDEMIA, UNSPECIFIED HYPERLIPIDEMIA TYPE: ICD-10-CM

## 2019-03-19 DIAGNOSIS — R92.8 ABNORMAL MAMMOGRAM: Primary | ICD-10-CM

## 2019-03-19 DIAGNOSIS — R71.8 ELEVATED HEMATOCRIT: Primary | ICD-10-CM

## 2019-03-19 DIAGNOSIS — I10 ESSENTIAL HYPERTENSION: ICD-10-CM

## 2019-03-19 DIAGNOSIS — N95.1 MENOPAUSAL SYNDROME: ICD-10-CM

## 2019-03-19 PROCEDURE — 99214 OFFICE O/P EST MOD 30 MIN: CPT | Performed by: NURSE PRACTITIONER

## 2019-03-19 RX ORDER — LEVOTHYROXINE SODIUM 0.07 MG/1
TABLET ORAL
Qty: 90 TABLET | Refills: 1 | Status: SHIPPED | OUTPATIENT
Start: 2019-03-19 | End: 2019-09-19 | Stop reason: SDUPTHER

## 2019-03-19 RX ORDER — ESTRADIOL 0.5 MG/1
0.5 TABLET ORAL DAILY
Qty: 90 TABLET | Refills: 2 | Status: SHIPPED | OUTPATIENT
Start: 2019-03-19 | End: 2019-12-19

## 2019-03-19 RX ORDER — ATORVASTATIN CALCIUM 20 MG/1
20 TABLET, FILM COATED ORAL NIGHTLY
Qty: 90 TABLET | Refills: 2 | Status: SHIPPED | OUTPATIENT
Start: 2019-03-19 | End: 2020-05-11

## 2019-03-19 NOTE — PROGRESS NOTES
Subjective   Isabel Handy is a 83 y.o. female.     History of Present Illness   The patient is here today to follow-up on lab work. She is feeling well.   Recently with complaints of nipple discharge, diagnostic mammogram and ultrasound negative. However patient does have cyst that need to be monitored.    Would like a refill of estradiol, has been out for a few weeks. Pt reports she has had some aches and pains since stopping. Would like refill.   The following portions of the patient's history were reviewed and updated as appropriate: allergies, current medications, past family history, past medical history, past social history, past surgical history and problem list.    Review of Systems   Constitutional: Negative.    Respiratory: Negative.    Cardiovascular: Negative.    Psychiatric/Behavioral: Negative.        Objective   Physical Exam   Constitutional: She appears well-developed and well-nourished.   Neck: Normal range of motion. Neck supple. No thyromegaly present.   Cardiovascular: Normal rate, regular rhythm, normal heart sounds and intact distal pulses.   Pulmonary/Chest: Effort normal and breath sounds normal.   Skin: Skin is warm and dry.   Psychiatric: She has a normal mood and affect. Her behavior is normal. Judgment and thought content normal.       Assessment/Plan   There are no diagnoses linked to this encounter.    1. Elevated hematocrit- baseline for pt but higher than usual, recheck in 1 month if still elevated see hematology  2. Menopausal symptoms- pt is doing well with estrace, aware of risks of HRT at her age, she prefers to continue this.   3. Hypothyroidism- continue levo at 75   4. HTN- well controlled  5. HPL- continue lipitor 20 mg daily  6. Overweight- could try belviq, she will research this. Discussed calorie tracking.    Shingrix- discussed

## 2019-04-11 ENCOUNTER — OFFICE VISIT (OUTPATIENT)
Dept: INTERNAL MEDICINE | Facility: CLINIC | Age: 84
End: 2019-04-11

## 2019-04-11 VITALS
WEIGHT: 165.1 LBS | HEIGHT: 63 IN | OXYGEN SATURATION: 96 % | HEART RATE: 81 BPM | DIASTOLIC BLOOD PRESSURE: 68 MMHG | TEMPERATURE: 97.8 F | SYSTOLIC BLOOD PRESSURE: 118 MMHG | BODY MASS INDEX: 29.25 KG/M2

## 2019-04-11 DIAGNOSIS — R10.11 RUQ ABDOMINAL PAIN: ICD-10-CM

## 2019-04-11 DIAGNOSIS — L02.91 ABSCESS: Primary | ICD-10-CM

## 2019-04-11 LAB
ALBUMIN SERPL-MCNC: 4.3 G/DL (ref 3.5–5.2)
ALBUMIN/GLOB SERPL: 2 G/DL
ALP SERPL-CCNC: 64 U/L (ref 39–117)
ALT SERPL-CCNC: 11 U/L (ref 1–33)
AST SERPL-CCNC: 15 U/L (ref 1–32)
BASOPHILS # BLD AUTO: 0.07 10*3/MM3 (ref 0–0.2)
BASOPHILS NFR BLD AUTO: 0.6 % (ref 0–1.5)
BILIRUB BLD-MCNC: NEGATIVE MG/DL
BILIRUB SERPL-MCNC: 0.9 MG/DL (ref 0.2–1.2)
BUN SERPL-MCNC: 13 MG/DL (ref 8–23)
BUN/CREAT SERPL: 15.5 (ref 7–25)
CALCIUM SERPL-MCNC: 9.4 MG/DL (ref 8.6–10.5)
CHLORIDE SERPL-SCNC: 98 MMOL/L (ref 98–107)
CLARITY, POC: CLEAR
CO2 SERPL-SCNC: 22.1 MMOL/L (ref 22–29)
COLOR UR: YELLOW
CREAT SERPL-MCNC: 0.84 MG/DL (ref 0.57–1)
EOSINOPHIL # BLD AUTO: 0.27 10*3/MM3 (ref 0–0.4)
EOSINOPHIL NFR BLD AUTO: 2.4 % (ref 0.3–6.2)
ERYTHROCYTE [DISTWIDTH] IN BLOOD BY AUTOMATED COUNT: 14 % (ref 12.3–15.4)
GLOBULIN SER CALC-MCNC: 2.2 GM/DL
GLUCOSE SERPL-MCNC: 100 MG/DL (ref 65–99)
GLUCOSE UR STRIP-MCNC: NEGATIVE MG/DL
HCT VFR BLD AUTO: 47.5 % (ref 34–46.6)
HGB BLD-MCNC: 14.9 G/DL (ref 12–15.9)
IMM GRANULOCYTES # BLD AUTO: 0.04 10*3/MM3 (ref 0–0.05)
IMM GRANULOCYTES NFR BLD AUTO: 0.4 % (ref 0–0.5)
KETONES UR QL: NEGATIVE
LEUKOCYTE EST, POC: NEGATIVE
LYMPHOCYTES # BLD AUTO: 3.39 10*3/MM3 (ref 0.7–3.1)
LYMPHOCYTES NFR BLD AUTO: 30.7 % (ref 19.6–45.3)
MCH RBC QN AUTO: 29.2 PG (ref 26.6–33)
MCHC RBC AUTO-ENTMCNC: 31.4 G/DL (ref 31.5–35.7)
MCV RBC AUTO: 93.1 FL (ref 79–97)
MONOCYTES # BLD AUTO: 0.83 10*3/MM3 (ref 0.1–0.9)
MONOCYTES NFR BLD AUTO: 7.5 % (ref 5–12)
NEUTROPHILS # BLD AUTO: 6.44 10*3/MM3 (ref 1.4–7)
NEUTROPHILS NFR BLD AUTO: 58.4 % (ref 42.7–76)
NITRITE UR-MCNC: NEGATIVE MG/ML
NRBC BLD AUTO-RTO: 0 /100 WBC (ref 0–0)
PH UR: 7 [PH] (ref 5–8)
PLATELET # BLD AUTO: 286 10*3/MM3 (ref 140–450)
POTASSIUM SERPL-SCNC: 4.5 MMOL/L (ref 3.5–5.2)
PROT SERPL-MCNC: 6.5 G/DL (ref 6–8.5)
PROT UR STRIP-MCNC: NEGATIVE MG/DL
RBC # BLD AUTO: 5.1 10*6/MM3 (ref 3.77–5.28)
RBC # UR STRIP: ABNORMAL /UL
SODIUM SERPL-SCNC: 135 MMOL/L (ref 136–145)
SP GR UR: 1.01 (ref 1–1.03)
UROBILINOGEN UR QL: NORMAL
WBC # BLD AUTO: 11.04 10*3/MM3 (ref 3.4–10.8)

## 2019-04-11 PROCEDURE — 99213 OFFICE O/P EST LOW 20 MIN: CPT | Performed by: NURSE PRACTITIONER

## 2019-04-11 PROCEDURE — 81003 URINALYSIS AUTO W/O SCOPE: CPT | Performed by: NURSE PRACTITIONER

## 2019-04-11 NOTE — PROGRESS NOTES
Subjective   Isabel Handy is a 83 y.o. female.     History of Present Illness    The patient is here today with c/o abd RUQ pain and mid back pain. Started 2 nights ago. Still with nausea. She had meat loaf at [employer] before the pain started. This has never occurred before. Denies fever or chills. Back pain is gone now. She really hasn't eaten much the last few days and now feeling some better.   Abscess- worse lately, left axilla, would like to have I&D  The following portions of the patient's history were reviewed and updated as appropriate: allergies, current medications, past family history, past medical history, past social history, past surgical history and problem list.    Review of Systems   Constitutional: Negative for chills and fever.   Respiratory: Negative.    Cardiovascular: Negative.    Gastrointestinal: Positive for abdominal distention (yest), abdominal pain, GERD and indigestion (yest).   Genitourinary: Negative for dysuria, flank pain, hematuria and urgency.   Musculoskeletal: Positive for back pain.       Objective   Physical Exam   Constitutional: She appears well-developed and well-nourished.   Neck: Normal range of motion. Neck supple. No thyromegaly present.   Cardiovascular: Normal rate, regular rhythm, normal heart sounds and intact distal pulses.   Pulmonary/Chest: Effort normal and breath sounds normal.   Abdominal: Soft. Normal appearance, normal aorta and bowel sounds are normal. There is no hepatosplenomegaly. There is no tenderness. There is no CVA tenderness.   Lymphadenopathy:     She has no cervical adenopathy.   Skin: Skin is warm and dry.   Left axilla with abscess present approx 1.5 cm, erythematous, no drainage, tender to palpation   Psychiatric: She has a normal mood and affect. Her behavior is normal. Judgment and thought content normal.       Assessment/Plan   Isabel was seen today for nausea and back pain.    Diagnoses and all orders for this visit:    Abscess  -      CBC & Differential  -     Ambulatory Referral to General Surgery    RUQ abdominal pain  -     CBC & Differential  -     Comprehensive Metabolic Panel  -     US abdomen limited  -     POCT urinalysis dipstick, automated  -     Urine Culture - Urine, Urine, Clean Catch                 1. Abscess - will place referral to gen surgery, hold off on antibiotic currently due to GI complaints  2. RUQ abd pain- check UA, CBC, CMP, abd US, if symptoms persist check HIDA, take nexium daily

## 2019-04-13 LAB
BACTERIA UR CULT: NO GROWTH
BACTERIA UR CULT: NORMAL

## 2019-04-17 ENCOUNTER — OFFICE VISIT (OUTPATIENT)
Dept: SURGERY | Facility: CLINIC | Age: 84
End: 2019-04-17

## 2019-04-17 VITALS — OXYGEN SATURATION: 96 % | WEIGHT: 164 LBS | HEART RATE: 90 BPM | BODY MASS INDEX: 29.06 KG/M2 | HEIGHT: 63 IN

## 2019-04-17 DIAGNOSIS — L72.3 SEBACEOUS CYST: Primary | ICD-10-CM

## 2019-04-17 PROCEDURE — 99212 OFFICE O/P EST SF 10 MIN: CPT | Performed by: SURGERY

## 2019-04-17 RX ORDER — SULFAMETHOXAZOLE AND TRIMETHOPRIM 800; 160 MG/1; MG/1
1 TABLET ORAL 2 TIMES DAILY
Qty: 20 TABLET | Refills: 0 | Status: SHIPPED | OUTPATIENT
Start: 2019-04-17 | End: 2019-05-01

## 2019-04-17 RX ORDER — SULFAMETHOXAZOLE AND TRIMETHOPRIM 800; 160 MG/1; MG/1
1 TABLET ORAL 2 TIMES DAILY
Qty: 20 TABLET | Refills: 0 | Status: SHIPPED | OUTPATIENT
Start: 2019-04-17 | End: 2019-04-17

## 2019-04-17 NOTE — PROGRESS NOTES
Cc: Painful infected cyst left axilla    History of presenting illness:   This is a delightful 83-year-old lady who says that for about the last 6 months she has had a small knot in her left axilla which for the most part was not bothering her, but about a week ago it became larger and more painful in a couple of days ago began to drain.  The drainage has slowed down but is still uncomfortable.  There is no radiation of any discomfort.    Past Medical History: Hypertension, gastroesophageal reflux disease, hypothyroidism, hyperlipidemia    Past Surgical History: Hysterectomy, appendectomy, colonoscopy, cataract surgery    Medications: Reviewed and reconciled in epic.  She does not take any anticoagulants outside of a low-dose aspirin    Allergies: Gatifloxacin, rosuvastatin    Social History: Patient is retired, she does not smoke or use alcohol    Family History: Colon cancer in her mother    Review of Systems:  Constitutional: Negative for fevers, chills, change in weight or appetite  Neck: no swollen glands or dysphagia or odynophagia  Respiratory: negative for SOB, cough, hemoptysis or wheezing  Cardiovascular: negative for chest pain, palpitations or peripheral edema  Gastrointestinal: Negative for nausea, vomiting, abdominal pain      Physical Exam:   Body mass index 29.1  General: alert and oriented, appropriate, no acute distress  Neck: Supple without lymphadenopathy or thyromegaly, trachea is in the midline  Respiratory: Lungs are clear bilaterally without wheezing, no use of accessory muscles is noted  Cardiovascular: Regular rate and rhythm without murmur, no peripheral edema  Gastrointestinal: Soft, benign, no hernia or hepatosplenomegaly  Skin: In the left axilla there is an approximately 1 cm inflamed sebaceous cyst with a small amount of drainage seen.  No surrounding cellulitis.    Laboratory data: None    Imaging data: None      Assessment and plan:   -Infected sebaceous cyst left axilla  -I will  start her on antibiotics and plan to bring her back for excision of this cyst in a couple of weeks.    Tone Schultz MD  General and Endoscopic Surgery  [facility]    [address], [address]  [place], [place], [address]  P: [phone]  F: [phone]          Toen Schultz MD, [facility]  General, Minimally Invasive and Endoscopic Surgery  [facility]    [address], [address]  [place], [place], [address]  P: [phone]  F: [phone]

## 2019-04-23 ENCOUNTER — HOSPITAL ENCOUNTER (OUTPATIENT)
Dept: ULTRASOUND IMAGING | Facility: HOSPITAL | Age: 84
Discharge: HOME | End: 2019-04-23
Admitting: NURSE PRACTITIONER

## 2019-04-23 PROCEDURE — 76705 ECHO EXAM OF ABDOMEN: CPT

## 2019-05-01 ENCOUNTER — PROCEDURE VISIT (OUTPATIENT)
Dept: SURGERY | Facility: CLINIC | Age: 84
End: 2019-05-01

## 2019-05-01 DIAGNOSIS — L72.3 SEBACEOUS CYST: Primary | ICD-10-CM

## 2019-05-01 PROCEDURE — 88304 TISSUE EXAM BY PATHOLOGIST: CPT | Performed by: SURGERY

## 2019-05-01 PROCEDURE — 11402 EXC TR-EXT B9+MARG 1.1-2 CM: CPT | Performed by: SURGERY

## 2019-05-01 PROCEDURE — 12031 INTMD RPR S/A/T/EXT 2.5 CM/<: CPT | Performed by: SURGERY

## 2019-05-01 NOTE — PROGRESS NOTES
Procedure note    Preoperative diagnosis: Sebaceous cyst left axilla    Postoperative diagnosis: Same    Procedure performed: Excision of infected sebaceous cyst from left axilla, size approximately 1 x 2 cm    Surgeon: Tone Schultz MD    Anesthesia: Local    Estimated blood loss: Less than 1 cc    Specimen: Sebaceous cyst left axilla    Complications: None    Description of procedure:  After obtaining informed consent, patient was brought to the procedure room and positioned appropriately.  Her left axilla was sterilely prepped and draped.  I then anesthetized the area with a mixture of lidocaine and Marcaine.  I made an elliptical skin incision around this ruptured cyst and dissected through the subcutaneous tissue.  I was able to identify the cyst wall and dissected circumferentially around it with a knife as well as with curved iris scissors and pickups.  The cyst was removed in its entirety and it was approximately 1 x 2 cm.  The Hyfrecator was used for bleeding control of some underlying subcutaneous vessels.  The wound was then irrigated and I then closed the deep tissues with 3-0 Vicryl and the skin was closed with interrupted 4-0 nylon sutures.  Sterile dressing was applied.  Patient tolerated this well.    Tone Schultz MD  General and Endoscopic Surgery  [facility]    [address], [address]  [place], [place], [address]  P: [phone]  F: [phone]

## 2019-05-03 LAB
CYTO UR: NORMAL
LAB AP CASE REPORT: NORMAL
LAB AP CLINICAL INFORMATION: NORMAL
PATH REPORT.FINAL DX SPEC: NORMAL
PATH REPORT.GROSS SPEC: NORMAL

## 2019-05-13 DIAGNOSIS — E03.9 HYPOTHYROIDISM, UNSPECIFIED TYPE: ICD-10-CM

## 2019-05-13 RX ORDER — LEVOTHYROXINE SODIUM 0.07 MG/1
TABLET ORAL
Qty: 60 TABLET | Refills: 0 | OUTPATIENT
Start: 2019-05-13

## 2019-05-15 ENCOUNTER — OFFICE VISIT (OUTPATIENT)
Dept: SURGERY | Facility: CLINIC | Age: 84
End: 2019-05-15

## 2019-05-15 DIAGNOSIS — L72.3 SEBACEOUS CYST: Primary | ICD-10-CM

## 2019-05-15 PROCEDURE — 99212 OFFICE O/P EST SF 10 MIN: CPT | Performed by: SURGERY

## 2019-05-15 NOTE — PROGRESS NOTES
Follow-up excision left axillary sebaceous cyst    Subjective:  No complaints.  No pain at incision site    Objective:  Left axillary incision site examined and there is no sign of infection.  Sutures are removed.    Assessment and plan:  -Sebaceous cyst left axilla, status post excision and closure  -Sutures removed today  -Ibuprofen as needed for discomfort  -Follow-up as needed      Tone Schultz MD  General and Endoscopic Surgery  [facility]    [address], [address]  [place], [place], [address]  P: [phone]  F: [phone]

## 2019-06-12 DIAGNOSIS — E03.9 HYPOTHYROIDISM, UNSPECIFIED TYPE: ICD-10-CM

## 2019-06-13 RX ORDER — LEVOTHYROXINE SODIUM 0.07 MG/1
TABLET ORAL
Qty: 60 TABLET | Refills: 0 | OUTPATIENT
Start: 2019-06-13

## 2019-08-14 ENCOUNTER — APPOINTMENT (OUTPATIENT)
Dept: WOMENS IMAGING | Facility: HOSPITAL | Age: 84
End: 2019-08-14

## 2019-08-14 PROCEDURE — 76641 ULTRASOUND BREAST COMPLETE: CPT | Performed by: RADIOLOGY

## 2019-08-14 PROCEDURE — MDREVIEWSP: Performed by: RADIOLOGY

## 2019-09-11 DIAGNOSIS — I10 ESSENTIAL HYPERTENSION: Primary | ICD-10-CM

## 2019-09-11 DIAGNOSIS — E78.5 HYPERLIPIDEMIA, UNSPECIFIED HYPERLIPIDEMIA TYPE: ICD-10-CM

## 2019-09-11 DIAGNOSIS — E03.9 HYPOTHYROIDISM, UNSPECIFIED TYPE: ICD-10-CM

## 2019-09-11 DIAGNOSIS — E55.9 VITAMIN D DEFICIENCY: ICD-10-CM

## 2019-09-13 LAB
25(OH)D3+25(OH)D2 SERPL-MCNC: 33.7 NG/ML (ref 30–100)
ALBUMIN SERPL-MCNC: 4.3 G/DL (ref 3.5–5.2)
ALBUMIN/GLOB SERPL: 2.3 G/DL
ALP SERPL-CCNC: 69 U/L (ref 39–117)
ALT SERPL-CCNC: 13 U/L (ref 1–33)
AST SERPL-CCNC: 17 U/L (ref 1–32)
BILIRUB SERPL-MCNC: 0.7 MG/DL (ref 0.2–1.2)
BUN SERPL-MCNC: 16 MG/DL (ref 8–23)
BUN/CREAT SERPL: 16.8 (ref 7–25)
CALCIUM SERPL-MCNC: 9.1 MG/DL (ref 8.6–10.5)
CHLORIDE SERPL-SCNC: 99 MMOL/L (ref 98–107)
CHOLEST SERPL-MCNC: 167 MG/DL (ref 0–200)
CO2 SERPL-SCNC: 21.5 MMOL/L (ref 22–29)
CREAT SERPL-MCNC: 0.95 MG/DL (ref 0.57–1)
GLOBULIN SER CALC-MCNC: 1.9 GM/DL
GLUCOSE SERPL-MCNC: 85 MG/DL (ref 65–99)
HDLC SERPL-MCNC: 83 MG/DL (ref 40–60)
LDLC SERPL CALC-MCNC: 68 MG/DL (ref 0–100)
LDLC/HDLC SERPL: 0.82 {RATIO}
POTASSIUM SERPL-SCNC: 4.4 MMOL/L (ref 3.5–5.2)
PROT SERPL-MCNC: 6.2 G/DL (ref 6–8.5)
SODIUM SERPL-SCNC: 136 MMOL/L (ref 136–145)
TRIGL SERPL-MCNC: 78 MG/DL (ref 0–150)
TSH SERPL DL<=0.005 MIU/L-ACNC: 1.54 UIU/ML (ref 0.27–4.2)
VLDLC SERPL CALC-MCNC: 15.6 MG/DL

## 2019-09-18 DIAGNOSIS — R92.8 ABNORMAL MAMMOGRAM: ICD-10-CM

## 2019-09-19 ENCOUNTER — OFFICE VISIT (OUTPATIENT)
Dept: INTERNAL MEDICINE | Facility: CLINIC | Age: 84
End: 2019-09-19

## 2019-09-19 VITALS
BODY MASS INDEX: 29.2 KG/M2 | TEMPERATURE: 97.7 F | OXYGEN SATURATION: 97 % | HEART RATE: 71 BPM | SYSTOLIC BLOOD PRESSURE: 112 MMHG | WEIGHT: 164.8 LBS | DIASTOLIC BLOOD PRESSURE: 60 MMHG | HEIGHT: 63 IN

## 2019-09-19 DIAGNOSIS — N95.1 MENOPAUSAL SYNDROME: ICD-10-CM

## 2019-09-19 DIAGNOSIS — G47.00 INSOMNIA, UNSPECIFIED TYPE: ICD-10-CM

## 2019-09-19 DIAGNOSIS — E78.5 HYPERLIPIDEMIA, UNSPECIFIED HYPERLIPIDEMIA TYPE: ICD-10-CM

## 2019-09-19 DIAGNOSIS — K21.9 GASTROESOPHAGEAL REFLUX DISEASE, ESOPHAGITIS PRESENCE NOT SPECIFIED: ICD-10-CM

## 2019-09-19 DIAGNOSIS — I65.29 CAROTID ATHEROSCLEROSIS, UNSPECIFIED LATERALITY: ICD-10-CM

## 2019-09-19 DIAGNOSIS — Z00.00 MEDICARE ANNUAL WELLNESS VISIT, SUBSEQUENT: Primary | ICD-10-CM

## 2019-09-19 DIAGNOSIS — I10 ESSENTIAL HYPERTENSION: ICD-10-CM

## 2019-09-19 DIAGNOSIS — E03.9 HYPOTHYROIDISM, UNSPECIFIED TYPE: ICD-10-CM

## 2019-09-19 DIAGNOSIS — R11.0 NAUSEA: ICD-10-CM

## 2019-09-19 PROCEDURE — G0439 PPPS, SUBSEQ VISIT: HCPCS | Performed by: NURSE PRACTITIONER

## 2019-09-19 PROCEDURE — 99214 OFFICE O/P EST MOD 30 MIN: CPT | Performed by: NURSE PRACTITIONER

## 2019-09-19 RX ORDER — LEVOTHYROXINE SODIUM 0.07 MG/1
75 TABLET ORAL DAILY
Qty: 90 TABLET | Refills: 1 | Status: SHIPPED | OUTPATIENT
Start: 2019-09-19 | End: 2020-04-27

## 2019-09-19 RX ORDER — PROMETHAZINE HYDROCHLORIDE 25 MG/1
25 TABLET ORAL DAILY PRN
Qty: 30 TABLET | Refills: 0 | Status: SHIPPED | OUTPATIENT
Start: 2019-09-19 | End: 2020-01-12

## 2019-09-19 RX ORDER — AMITRIPTYLINE HYDROCHLORIDE 25 MG/1
25 TABLET, FILM COATED ORAL NIGHTLY
Qty: 90 TABLET | Refills: 1 | Status: SHIPPED | OUTPATIENT
Start: 2019-09-19 | End: 2020-11-02 | Stop reason: SDUPTHER

## 2019-09-19 NOTE — PROGRESS NOTES
"Subjective   Isabel Handy is a 83 y.o. female.      History of Present Illness   The patient is here today to F/U on lab work. She is feeling well.     HTN-Pt is doing well with current medication regimen, denies adverse reactions, compliant with medication schedule.   HPL-Pt is doing well with current medication regimen, denies adverse reactions, compliant with medication schedule.   Hypothyroidism-   \"Once in a great while I have little nauseated feelings, like I've had for a few years.\" Takes promethazine PRN. Keeps her from having vomiting episodes.     Rarely takes mobic. Does take ibuprofen PRN.     Fell getting in to her son's truck, got an US and x-ray. Thought she may have had a blood clot, was negative.     Was hospitalized at [facility] in April after taking Bactrim DARIA and dehydration.     Has a hard time remember people's names. \"I dont forget any of my daily activities or my bills.\"  The following portions of the patient's history were reviewed and updated as appropriate: allergies, current medications, past family history, past medical history, past social history, past surgical history and problem list.    Review of Systems   Constitutional: Negative for chills and fever.   Respiratory: Negative.    Cardiovascular: Negative.    Psychiatric/Behavioral: Positive for sleep disturbance. Negative for dysphoric mood and suicidal ideas. The patient is not nervous/anxious.        Objective   Physical Exam   Constitutional: She appears well-developed and well-nourished.   Neck: Normal range of motion. Neck supple. No thyromegaly present.   Cardiovascular: Normal rate, regular rhythm, normal heart sounds and intact distal pulses.   Pulmonary/Chest: Effort normal and breath sounds normal.   Lymphadenopathy:     She has no cervical adenopathy.   Skin: Skin is warm and dry.   Psychiatric: She has a normal mood and affect. Her behavior is normal. Judgment and thought content normal.       Assessment/Plan   There " are no diagnoses linked to this encounter.           1. HPL- doing well, continue same  2. Hypothyroidism-continue 75 mcg daily  3. HTN- well controlled  4. HRT- pt aware of risks and benefits and prefers to continue estrogen  5. Nausea- will refill PRN promethazine  6. Carotid artery atherosclerosis- to see cardiology, check ASA with them

## 2019-09-19 NOTE — PROGRESS NOTES
The ABCs of the Annual Wellness Visit  Subsequent Medicare Wellness Visit    Chief Complaint   Patient presents with   • Medicare Wellness-subsequent       Subjective   History of Present Illness:  Isabel Handy is a 83 y.o. female who presents for a Subsequent Medicare Wellness Visit.    HEALTH RISK ASSESSMENT    Recent Hospitalizations:  Recently treated at the following:  Other: [place]    Current Medical Providers:  Patient Care Team:  Beth Figueredo APRN as PCP - General (Family Medicine)  Beth Figueredo APRN as PCP - Internal Medicine (Internal Medicine)  Beth Figueredo APRN as PCP - Claims Attributed    Smoking Status:  Social History     Tobacco Use   Smoking Status Former Smoker   Smokeless Tobacco Never Used   Tobacco Comment    years ago - too many years to remember       Alcohol Consumption:  Social History     Substance and Sexual Activity   Alcohol Use No       Depression Screen:   PHQ-2/PHQ-9 Depression Screening 9/19/2019   Little interest or pleasure in doing things 0   Feeling down, depressed, or hopeless 0   Trouble falling or staying asleep, or sleeping too much 1   Feeling tired or having little energy 0   Poor appetite or overeating 0   Feeling bad about yourself - or that you are a failure or have let yourself or your family down 0   Trouble concentrating on things, such as reading the newspaper or watching television 0   Moving or speaking so slowly that other people could have noticed. Or the opposite - being so fidgety or restless that you have been moving around a lot more than usual 0   Thoughts that you would be better off dead, or of hurting yourself in some way 0   Total Score 1   If you checked off any problems, how difficult have these problems made it for you to do your work, take care of things at home, or get along with other people? Not difficult at all       Fall Risk Screen:  STEADI Fall Risk Assessment was completed, and patient is at LOW risk for falls.Assessment completed  on:9/19/2019    Health Habits and Functional and Cognitive Screening:  Functional & Cognitive Status 9/19/2019   Do you have difficulty preparing food and eating? No   Do you have difficulty bathing yourself, getting dressed or grooming yourself? No   Do you have difficulty using the toilet? No   Do you have difficulty moving around from place to place? No   Do you have trouble with steps or getting out of a bed or a chair? Yes   Current Diet Well Balanced Diet   Dental Exam Up to date   Eye Exam Up to date   Exercise (times per week) 3 times per week   Current Exercise Activities Include Walking   Do you need help using the phone?  No   Are you deaf or do you have serious difficulty hearing?  Yes   Do you need help with transportation? No   Do you need help shopping? No   Do you need help preparing meals?  No   Do you need help with housework?  No   Do you need help with laundry? No   Do you need help taking your medications? No   Do you need help managing money? No   Do you ever drive or ride in a car without wearing a seat belt? No   Have you felt unusual stress, anger or loneliness in the last month? No   Who do you live with? Alone   If you need help, do you have trouble finding someone available to you? No   Have you been bothered in the last four weeks by sexual problems? No   Do you have difficulty concentrating, remembering or making decisions? Yes         Does the patient have evidence of cognitive impairment? No    Asprin use counseling:Taking ASA appropriately as indicated  To discuss ASA with cardiology, hx of carotid stenosis.   Age-appropriate Screening Schedule:  Refer to the list below for future screening recommendations based on patient's age, sex and/or medical conditions. Orders for these recommended tests are listed in the plan section. The patient has been provided with a written plan.    Health Maintenance   Topic Date Due   • TDAP/TD VACCINES (1 - Tdap) 11/27/1954   • ZOSTER VACCINE (1 of 2)  11/27/1987   • INFLUENZA VACCINE  08/01/2019   • MAMMOGRAM  03/19/2020   • LIPID PANEL  09/12/2020   • COLONOSCOPY  09/21/2023   • DXA SCAN  10/11/2023   • PNEUMOCOCCAL VACCINES (65+ LOW/MEDIUM RISK)  Completed          The following portions of the patient's history were reviewed and updated as appropriate: allergies, current medications, past family history, past medical history, past social history, past surgical history and problem list.    Outpatient Medications Prior to Visit   Medication Sig Dispense Refill   • amitriptyline (ELAVIL) 25 MG tablet TAKE 1 TABLET BY MOUTH EVERY NIGHT. 90 tablet 1   • aspirin 81 MG tablet Take 1 tablet by mouth Daily. Taking every other day     • atorvastatin (LIPITOR) 20 MG tablet Take 1 tablet by mouth Every Night. 90 tablet 2   • Docusate Calcium (STOOL SOFTENER PO) Take 1 tablet by mouth Daily As Needed.     • esomeprazole (NexIUM) 20 MG capsule Take 20 mg by mouth every morning before breakfast.     • estradiol (ESTRACE) 0.5 MG tablet Take 1 tablet by mouth Daily. 90 tablet 2   • levothyroxine (SYNTHROID, LEVOTHROID) 75 MCG tablet TAKE 1 TABLET BY MOUTH EVERY DAY 90 tablet 1   • lisinopril (PRINIVIL,ZESTRIL) 20 MG tablet Take 1 tablet by mouth Daily. 90 tablet 3   • meloxicam (MOBIC) 7.5 MG tablet Take 1 tablet by mouth Daily As Needed for Mild Pain . Take with food 30 tablet 6   • Multiple Vitamins-Minerals (MULTIVITAMIN WITH MINERALS) tablet tablet Take 1 tablet by mouth Daily.     • Probiotic Product (SOLUBLE FIBER/PROBIOTICS) chewable tablet Chew 1 each Daily.     • promethazine (PHENERGAN) 25 MG tablet Take 1 tablet by mouth Daily As Needed for Nausea or Vomiting. 30 tablet 0     No facility-administered medications prior to visit.        Patient Active Problem List   Diagnosis   • Gastroesophageal reflux disease   • Menopausal syndrome   • Osteoarthritis of cervical spine   • Carotid atherosclerosis   • Prediabetes   • Low back pain   • Insomnia   • Hypothyroidism   •  "Essential hypertension   • Hyperlipidemia   • Allergic rhinitis   • Palpitations   • Anxiety   • PVC's (premature ventricular contractions)   • Vitamin D deficiency   • Lung nodule   • Hyperglycemia   • Occipital pain   • Malaise and fatigue   • Acute nonintractable headache   • Elevated hematocrit       Advanced Care Planning:  Patient does not have an advance directive - not interested in additional information    Review of Systems    Compared to one year ago, the patient feels her physical health is the same.  Compared to one year ago, the patient feels her mental health is the same.    Reviewed chart for potential of high risk medication in the elderly: yes  Reviewed chart for potential of harmful drug interactions in the elderly:yes    Objective         Vitals:    09/19/19 1310   BP: 112/60   Pulse: 71   Temp: 97.7 °F (36.5 °C)   SpO2: 97%   Weight: 74.8 kg (164 lb 12.8 oz)   Height: 160 cm (62.99\")   PainSc: 0-No pain       Body mass index is 29.2 kg/m².  Discussed the patient's BMI with her. The BMI is above average; BMI management plan is completed.    Physical Exam    Lab Results   Component Value Date    GLU 85 09/12/2019    CHLPL 167 09/12/2019    TRIG 78 09/12/2019    HDL 83 (H) 09/12/2019    LDL 68 09/12/2019    VLDL 15.6 09/12/2019        Assessment/Plan   Medicare Risks and Personalized Health Plan  CMS Preventative Services Quick Reference  Advance Directive Discussion  Breast Cancer/Mammogram Screening  Cardiovascular risk  Depression/Dysphoria  Diabetic Lab Screening   Fall Risk  Glaucoma Risk  Immunizations Discussed/Encouraged (specific immunizations; Influenza and Shingrix )  Inactivity/Sedentary  Obesity/Overweight   Polypharmacy    The above risks/problems have been discussed with the patient.  Pertinent information has been shared with the patient in the After Visit Summary.  Follow up plans and orders are seen below in the Assessment/Plan Section.    Diagnoses and all orders for this " visit:    1. Hypothyroidism, unspecified type    2. Insomnia, unspecified type    3. Gastroesophageal reflux disease, esophagitis presence not specified    4. Nausea      Follow Up:  No Follow-up on file.   Discussed shingrix and flu vaccine  An After Visit Summary and PPPS were given to the patient.

## 2019-10-10 ENCOUNTER — OFFICE VISIT (OUTPATIENT)
Dept: CARDIOLOGY | Facility: CLINIC | Age: 84
End: 2019-10-10

## 2019-10-10 VITALS
WEIGHT: 166 LBS | HEART RATE: 70 BPM | BODY MASS INDEX: 29.41 KG/M2 | HEIGHT: 63 IN | SYSTOLIC BLOOD PRESSURE: 120 MMHG | DIASTOLIC BLOOD PRESSURE: 60 MMHG

## 2019-10-10 DIAGNOSIS — E78.5 HYPERLIPIDEMIA, UNSPECIFIED HYPERLIPIDEMIA TYPE: ICD-10-CM

## 2019-10-10 DIAGNOSIS — I49.3 PVC'S (PREMATURE VENTRICULAR CONTRACTIONS): ICD-10-CM

## 2019-10-10 DIAGNOSIS — I65.29 CAROTID ATHEROSCLEROSIS, UNSPECIFIED LATERALITY: Primary | ICD-10-CM

## 2019-10-10 DIAGNOSIS — I10 ESSENTIAL HYPERTENSION: ICD-10-CM

## 2019-10-10 PROCEDURE — 99214 OFFICE O/P EST MOD 30 MIN: CPT | Performed by: NURSE PRACTITIONER

## 2019-10-10 PROCEDURE — 93000 ELECTROCARDIOGRAM COMPLETE: CPT | Performed by: NURSE PRACTITIONER

## 2019-10-10 NOTE — PROGRESS NOTES
Subjective:     Encounter Date:10/10/2019      Patient ID: Isabel Handy is a 83 y.o. female.    Chief Complaint: annual cardiac evaluation  History of Present Illness   She is a new patient to me and I have reviewed her past medical records.  She is a patient of Dr. Mcmahon.  She is followed for PVCs.    She was originally diagnosed with multiple PVCs March 2016.  They actually woke her up in the morning.  She was referred to the chest pain unit.  Her troponin was negative and her TSH was well controlled.  She continued to feel ill and had some intermittent chest pain and pressure.  She had a dobutamine stress echocardiogram 3/16/2016.  This showed no evidence of ischemia.  Aortic valve had mild calcification.  Her baseline EF was 64%.  There was no other valvular disease.  Her chamber sizes were normal.  It was a normal stress echo.  She had a Holter performed 3/15/2016 that showed 100 PVCs per hour.  There were rare PACs.     She is a history of carotid stenosis diagnosed in the past.  She has thyroid disease which is normally control.  She is very strong family history of cardiac disease.  Both of her parents of myocardial infarctions and she's had 2 brothers with myocardial infarctions as well.     She had labs performed 9/12/2019 that revealed a total cholesterol 167, triglycerides 78, HDL 83, LDL 68.  TSH was within normal limits.  CMP was unremarkable.  Vitamin D 33.7.    Over the past year she has felt very well.  She continues to stay very active.  She walks daily.  She denies any palpitations, shortness of breath, chest pain or chest tightness.  She is had no episode of lightheadedness or dizziness.  She continues to volunteer and is looking forward to working at the [employer] for the upcoming election.  She states at the end of the day on some day she will have some lower extremity edema that does resolve at night.  She is taking her medications as instructed.    Carotid study 5/2016 with  carotid atheromatous plaque and no significant stenosis.   She had normal bilateral carotid ultrasounds performed 10/18.     The following portions of the patient's history were reviewed and updated as appropriate: allergies, current medications, past family history, past medical history, past social history, past surgical history and problem list.    Review of Systems   Constitution: Negative for weakness and malaise/fatigue.   HENT: Negative for congestion, hoarse voice and sore throat.    Eyes: Negative for blurred vision, double vision, photophobia, vision loss in left eye and vision loss in right eye.   Cardiovascular: Negative for chest pain, dyspnea on exertion, irregular heartbeat, leg swelling, near-syncope, orthopnea, palpitations, paroxysmal nocturnal dyspnea and syncope.   Respiratory: Negative for cough, hemoptysis, shortness of breath, sleep disturbances due to breathing, snoring, sputum production and wheezing.    Endocrine: Negative.    Hematologic/Lymphatic: Does not bruise/bleed easily.   Skin: Negative for color change, dry skin, poor wound healing and rash.   Musculoskeletal: Negative for back pain, falls, gout, joint pain, joint swelling, muscle cramps and muscle weakness.   Gastrointestinal: Negative for abdominal pain, constipation, diarrhea, dysphagia, melena, nausea and vomiting.   Neurological: Negative for excessive daytime sleepiness, dizziness, headaches, light-headedness, loss of balance, numbness, paresthesias, seizures and vertigo.   Psychiatric/Behavioral: Negative for depression and substance abuse. The patient is not nervous/anxious.        ECG 12 Lead  Date/Time: 10/10/2019 2:45 PM  Performed by: Eve Degroot APRN  Authorized by: Eve Degroot APRN   Comparison: compared with previous ECG from 10/8/2018  Similar to previous ECG  Rhythm: sinus rhythm  Rate: normal  Conduction: conduction normal  ST Segments: ST segments normal  T flattening: V2  QRS axis: normal  Other findings:  low voltage and poor R wave progression    Clinical impression: non-specific ECG               Objective:         Physical Exam   Constitutional: He is oriented to person, place, and time. Vital signs are normal. He appears well-developed and well-nourished. No distress.   HENT:   Head: Normocephalic and atraumatic.   Right Ear: Hearing normal.   Left Ear: Hearing normal.   Eyes: Conjunctivae and lids are normal.   Neck: Normal range of motion. Neck supple. No JVD present. Carotid bruit is not present. No thyromegaly present.   Cardiovascular: Normal rate, regular rhythm, S1 normal, S2 normal, normal heart sounds and intact distal pulses.  PMI is not displaced.  Exam reveals no gallop.    No murmur heard.  Pulses:       Carotid pulses are 2+ on the right side, and 2+ on the left side.       Radial pulses are 2+ on the right side, and 2+ on the left side.        Dorsalis pedis pulses are 2+ on the right side, and 2+ on the left side.        Posterior tibial pulses are 2+ on the right side, and 2+ on the left side.   Pulmonary/Chest: Effort normal and breath sounds normal. No respiratory distress. He has no wheezes. He has no rhonchi. He has no rales. He exhibits no tenderness.   Abdominal: Soft. Normal appearance and bowel sounds are normal. He exhibits no distension, no abdominal bruit and no mass. There is no tenderness.   Musculoskeletal: Normal range of motion.   Exhibits no edema or deformity   Lymphadenopathy:     He has no cervical adenopathy.   Neurological: He is alert and oriented to person, place, and time. No cranial nerve deficit. Coordination and gait normal.   Oriented to person, place and time.   Skin: Skin is warm, dry and intact. No rash noted. He is not diaphoretic. No cyanosis. Nails show no clubbing.   Psychiatric: He has a normal mood and affect. His speech is normal and behavior is normal. Judgment and thought content normal. Cognition and memory are normal.     Vitals:    10/10/19 1426   BP:  "120/60   BP Location: Left arm   Pulse: 70   Weight: 75.3 kg (166 lb)   Height: 159.8 cm (62.9\")           Lab Review:       Assessment:          Diagnosis Plan   1. Carotid atherosclerosis, unspecified laterality     2. Essential hypertension     3. Hyperlipidemia, unspecified hyperlipidemia type     4. PVC's (premature ventricular contractions)            Plan:       1.  Carotid atherosclerosis- normal carotid ultrasound 10/18  2.  Essential HTN - controlled  3.  Hyperlipidemia - continue lipid lowering therapy  4.  PVCs - stable, intermittent    RTO in 1 year with RM    ED [facility]      Current Outpatient Medications:   •  amitriptyline (ELAVIL) 25 MG tablet, Take 1 tablet by mouth Every Night., Disp: 90 tablet, Rfl: 1  •  aspirin 81 MG tablet, Take 1 tablet by mouth Daily. Taking every other day, Disp: , Rfl:   •  atorvastatin (LIPITOR) 20 MG tablet, Take 1 tablet by mouth Every Night., Disp: 90 tablet, Rfl: 2  •  Docusate Calcium (STOOL SOFTENER PO), Take 1 tablet by mouth Daily As Needed., Disp: , Rfl:   •  esomeprazole (NexIUM) 20 MG capsule, Take 20 mg by mouth every morning before breakfast., Disp: , Rfl:   •  estradiol (ESTRACE) 0.5 MG tablet, Take 1 tablet by mouth Daily., Disp: 90 tablet, Rfl: 2  •  levothyroxine (SYNTHROID, LEVOTHROID) 75 MCG tablet, Take 1 tablet by mouth Daily., Disp: 90 tablet, Rfl: 1  •  lisinopril (PRINIVIL,ZESTRIL) 20 MG tablet, Take 1 tablet by mouth Daily., Disp: 90 tablet, Rfl: 3  •  meloxicam (MOBIC) 7.5 MG tablet, Take 1 tablet by mouth Daily As Needed for Mild Pain . Take with food, Disp: 30 tablet, Rfl: 6  •  Multiple Vitamins-Minerals (MULTIVITAMIN WITH MINERALS) tablet tablet, Take 1 tablet by mouth Daily., Disp: , Rfl:   •  Probiotic Product (SOLUBLE FIBER/PROBIOTICS) chewable tablet, Chew 1 each Daily., Disp: , Rfl:   •  promethazine (PHENERGAN) 25 MG tablet, Take 1 tablet by mouth Daily As Needed for Nausea or Vomiting., Disp: 30 tablet, Rfl: 0           "

## 2019-10-28 ENCOUNTER — FLU SHOT (OUTPATIENT)
Dept: INTERNAL MEDICINE | Facility: CLINIC | Age: 84
End: 2019-10-28

## 2019-10-28 DIAGNOSIS — Z23 NEED FOR INFLUENZA VACCINATION: ICD-10-CM

## 2019-10-28 PROCEDURE — G0008 ADMIN INFLUENZA VIRUS VAC: HCPCS | Performed by: NURSE PRACTITIONER

## 2019-10-28 PROCEDURE — 90653 IIV ADJUVANT VACCINE IM: CPT | Performed by: NURSE PRACTITIONER

## 2019-12-19 RX ORDER — ESTRADIOL 0.5 MG/1
0.5 TABLET ORAL DAILY
Qty: 90 TABLET | Refills: 1 | Status: SHIPPED | OUTPATIENT
Start: 2019-12-19 | End: 2020-06-18

## 2020-01-16 ENCOUNTER — OFFICE VISIT (OUTPATIENT)
Dept: INTERNAL MEDICINE | Facility: CLINIC | Age: 85
End: 2020-01-16

## 2020-01-16 ENCOUNTER — HOSPITAL ENCOUNTER (OUTPATIENT)
Dept: GENERAL RADIOLOGY | Facility: HOSPITAL | Age: 85
Discharge: HOME | End: 2020-01-16
Admitting: NURSE PRACTITIONER

## 2020-01-16 VITALS
DIASTOLIC BLOOD PRESSURE: 76 MMHG | BODY MASS INDEX: 29.32 KG/M2 | SYSTOLIC BLOOD PRESSURE: 124 MMHG | OXYGEN SATURATION: 96 % | TEMPERATURE: 98 F | HEIGHT: 63 IN | HEART RATE: 84 BPM | WEIGHT: 165.5 LBS

## 2020-01-16 DIAGNOSIS — J18.9 PNEUMONIA OF LEFT LOWER LOBE DUE TO INFECTIOUS ORGANISM: Primary | ICD-10-CM

## 2020-01-16 PROCEDURE — 71046 X-RAY EXAM CHEST 2 VIEWS: CPT

## 2020-01-16 PROCEDURE — 99213 OFFICE O/P EST LOW 20 MIN: CPT | Performed by: NURSE PRACTITIONER

## 2020-01-16 RX ORDER — AZITHROMYCIN 250 MG/1
TABLET, FILM COATED ORAL
Qty: 6 TABLET | Refills: 0 | Status: SHIPPED | OUTPATIENT
Start: 2020-01-16 | End: 2020-01-20

## 2020-01-16 NOTE — PROGRESS NOTES
Subjective   Isabel Handy is a 84 y.o. female.     History of Present Illness    The patient is here today with c/o continued cough, wheezing. Productive cough clear, small amt. She does not feel SOA.   Patient was seen in urgent care January 6- diagnosed with sore throat laryngitis, Rx Amoxil  Urgent care January 7- flu- Tamiflu Rx, now completed  Urgent care January 12 with complaints of dysuria, UTI, Rx Rx Augmentin, guaifenesin, Pyridium, UTI symptoms resolved, finishing antibiotic.   Taking probiotic.   The following portions of the patient's history were reviewed and updated as appropriate: allergies, current medications, past family history, past medical history, past social history, past surgical history and problem list.    Review of Systems   Constitutional: Negative.    HENT: Positive for rhinorrhea (creamy colored). Negative for ear pain, postnasal drip and sore throat.    Respiratory: Positive for cough and wheezing. Negative for shortness of breath.    Cardiovascular: Negative.        Objective   Physical Exam   Constitutional: She appears well-developed and well-nourished.   HENT:   Right Ear: Hearing, external ear and ear canal normal. A middle ear effusion is present.   Left Ear: Hearing, tympanic membrane, external ear and ear canal normal.   Nose: Nose normal.   Mouth/Throat: Uvula is midline, oropharynx is clear and moist and mucous membranes are normal.   Neck: Normal range of motion. Neck supple. No thyromegaly present.   Cardiovascular: Normal rate, regular rhythm, normal heart sounds and intact distal pulses.   Pulmonary/Chest: Effort normal. She has rhonchi in the right upper field, the right middle field, the right lower field, the left upper field and the left middle field. She has rales in the left lower field.   Lymphadenopathy:     She has no cervical adenopathy.   Skin: Skin is warm and dry.   Psychiatric: She has a normal mood and affect. Her behavior is normal. Judgment and  thought content normal.       Vitals:    01/16/20 1401   BP: 124/76   Pulse: 84   Temp: 98 °F (36.7 °C)   SpO2: 96%     Body mass index is 29.32 kg/m².      Assessment/Plan   Isabel was seen today for urgent care follow up.    Diagnoses and all orders for this visit:    Pneumonia of left lower lobe due to infectious organism (CMS/HCC)    Other orders  -     azithromycin (ZITHROMAX) 250 MG tablet; Take 2 tablets the first day, then 1 tablet daily for 4 days.                 1. Pneumonia- check CXR, finish augmentin, add z-pack, continue probiotic, hydrate, mucinex and rest  To ER with persistent or worsening symptoms  F/U in 1 week

## 2020-01-23 ENCOUNTER — OFFICE VISIT (OUTPATIENT)
Dept: INTERNAL MEDICINE | Facility: CLINIC | Age: 85
End: 2020-01-23

## 2020-01-23 VITALS
WEIGHT: 163.5 LBS | DIASTOLIC BLOOD PRESSURE: 66 MMHG | TEMPERATURE: 97.7 F | BODY MASS INDEX: 28.97 KG/M2 | SYSTOLIC BLOOD PRESSURE: 120 MMHG | OXYGEN SATURATION: 97 % | HEIGHT: 63 IN | HEART RATE: 78 BPM

## 2020-01-23 DIAGNOSIS — B37.0 THRUSH: ICD-10-CM

## 2020-01-23 DIAGNOSIS — N30.00 ACUTE CYSTITIS WITHOUT HEMATURIA: ICD-10-CM

## 2020-01-23 DIAGNOSIS — J40 BRONCHITIS: Primary | ICD-10-CM

## 2020-01-23 PROCEDURE — 99213 OFFICE O/P EST LOW 20 MIN: CPT | Performed by: NURSE PRACTITIONER

## 2020-01-23 NOTE — PROGRESS NOTES
"Subjective   Isabel Handy is a 84 y.o. female.     History of Present Illness    The patient is here today to F/U on bronchitis/pneumonia, finished augmentin and z-pack. Went to [facility] [place], dx with UTI started on bactrim. Still taking probiotic, now with thrush on nystatin. Denies diarrhea. Mouth is dry now.     \"Im feeling better.\"  The following portions of the patient's history were reviewed and updated as appropriate: allergies, current medications, past family history, past medical history, past social history, past surgical history and problem list.    Review of Systems   Constitutional: Negative.    HENT: Positive for postnasal drip and rhinorrhea. Negative for ear pain and sore throat.    Respiratory: Positive for cough (in the morning some white sputum only). Negative for shortness of breath.    Cardiovascular: Negative.        Objective   Physical Exam   Constitutional: She appears well-developed and well-nourished.   HENT:   Right Ear: Hearing, external ear and ear canal normal. Tympanic membrane is retracted.   Left Ear: Hearing, tympanic membrane, external ear and ear canal normal.   Nose: Nose normal.   Mouth/Throat: Uvula is midline, oropharynx is clear and moist and mucous membranes are normal.   Neck: Normal range of motion. Neck supple. No thyromegaly present.   Cardiovascular: Normal rate, regular rhythm, normal heart sounds and intact distal pulses.   Pulmonary/Chest: Effort normal. She has rales (trace) in the left lower field.   Lymphadenopathy:     She has no cervical adenopathy.   Skin: Skin is warm and dry.   Psychiatric: She has a normal mood and affect. Her behavior is normal. Judgment and thought content normal.       Vitals:    01/23/20 1259   BP: 120/66   Pulse: 78   Temp: 97.7 °F (36.5 °C)   SpO2: 97%     Body mass index is 28.97 kg/m².      Assessment/Plan   Isabel was seen today for 1 week follow up.    Diagnoses and all orders for this visit:    Bronchitis    Acute cystitis " without hematuria    Thrush                 1. Bronchitis/Pneumonia- resolved, continue to hydrate and rest  2. UTI- improving on correct antibiotic  3. Thrush- call if refill needed on nystatin   Continue probiotic and notify for diarrhea

## 2020-02-14 DIAGNOSIS — N60.01 BILATERAL BREAST CYSTS: Primary | ICD-10-CM

## 2020-02-14 DIAGNOSIS — Z12.39 SCREENING FOR MALIGNANT NEOPLASM OF BREAST: ICD-10-CM

## 2020-02-14 DIAGNOSIS — N60.02 BILATERAL BREAST CYSTS: Primary | ICD-10-CM

## 2020-02-18 ENCOUNTER — APPOINTMENT (OUTPATIENT)
Dept: WOMENS IMAGING | Facility: HOSPITAL | Age: 85
End: 2020-02-18

## 2020-02-18 DIAGNOSIS — N60.01 BILATERAL BREAST CYSTS: Primary | ICD-10-CM

## 2020-02-18 DIAGNOSIS — N60.02 BILATERAL BREAST CYSTS: Primary | ICD-10-CM

## 2020-02-18 DIAGNOSIS — I10 ESSENTIAL HYPERTENSION: ICD-10-CM

## 2020-02-18 PROCEDURE — G0279 TOMOSYNTHESIS, MAMMO: HCPCS | Performed by: RADIOLOGY

## 2020-02-18 PROCEDURE — 76641 ULTRASOUND BREAST COMPLETE: CPT | Performed by: RADIOLOGY

## 2020-02-18 PROCEDURE — 77065 DX MAMMO INCL CAD UNI: CPT | Performed by: RADIOLOGY

## 2020-02-18 RX ORDER — LISINOPRIL 20 MG/1
TABLET ORAL
Qty: 90 TABLET | Refills: 0 | Status: SHIPPED | OUTPATIENT
Start: 2020-02-18 | End: 2020-05-11

## 2020-03-19 ENCOUNTER — RESULTS ENCOUNTER (OUTPATIENT)
Dept: INTERNAL MEDICINE | Facility: CLINIC | Age: 85
End: 2020-03-19

## 2020-03-19 ENCOUNTER — APPOINTMENT (OUTPATIENT)
Dept: LAB | Facility: HOSPITAL | Age: 85
End: 2020-03-19

## 2020-03-19 DIAGNOSIS — E03.9 HYPOTHYROIDISM, UNSPECIFIED TYPE: ICD-10-CM

## 2020-03-19 DIAGNOSIS — K21.9 GASTROESOPHAGEAL REFLUX DISEASE, ESOPHAGITIS PRESENCE NOT SPECIFIED: ICD-10-CM

## 2020-03-19 DIAGNOSIS — I65.29 CAROTID ATHEROSCLEROSIS, UNSPECIFIED LATERALITY: ICD-10-CM

## 2020-03-19 DIAGNOSIS — Z00.00 MEDICARE ANNUAL WELLNESS VISIT, SUBSEQUENT: ICD-10-CM

## 2020-03-19 DIAGNOSIS — I10 ESSENTIAL HYPERTENSION: ICD-10-CM

## 2020-03-19 DIAGNOSIS — E78.5 HYPERLIPIDEMIA, UNSPECIFIED HYPERLIPIDEMIA TYPE: ICD-10-CM

## 2020-03-19 DIAGNOSIS — G47.00 INSOMNIA, UNSPECIFIED TYPE: ICD-10-CM

## 2020-03-19 DIAGNOSIS — N95.1 MENOPAUSAL SYNDROME: ICD-10-CM

## 2020-03-19 DIAGNOSIS — I10 ESSENTIAL HYPERTENSION: Primary | ICD-10-CM

## 2020-03-19 DIAGNOSIS — R11.0 NAUSEA: ICD-10-CM

## 2020-03-23 ENCOUNTER — APPOINTMENT (OUTPATIENT)
Dept: LAB | Facility: HOSPITAL | Age: 85
End: 2020-03-23

## 2020-03-23 LAB
ALBUMIN SERPL-MCNC: 4.7 G/DL (ref 3.5–5.2)
ALBUMIN/GLOB SERPL: 2 G/DL
ALP SERPL-CCNC: 82 U/L (ref 39–117)
ALT SERPL W P-5'-P-CCNC: 15 U/L (ref 1–33)
ANION GAP SERPL CALCULATED.3IONS-SCNC: 12.1 MMOL/L (ref 5–15)
AST SERPL-CCNC: 21 U/L (ref 1–32)
BASOPHILS # BLD AUTO: 0.06 10*3/MM3 (ref 0–0.2)
BASOPHILS NFR BLD AUTO: 0.9 % (ref 0–1.5)
BILIRUB SERPL-MCNC: 0.6 MG/DL (ref 0.1–1.2)
BUN BLD-MCNC: 14 MG/DL (ref 8–23)
BUN/CREAT SERPL: 18.2 (ref 7–25)
CALCIUM SPEC-SCNC: 10.2 MG/DL (ref 8.6–10.5)
CHLORIDE SERPL-SCNC: 103 MMOL/L (ref 98–107)
CHOLEST SERPL-MCNC: 203 MG/DL (ref 0–200)
CO2 SERPL-SCNC: 25.9 MMOL/L (ref 22–29)
CREAT BLD-MCNC: 0.77 MG/DL (ref 0.57–1)
DEPRECATED RDW RBC AUTO: 46.2 FL (ref 37–54)
EOSINOPHIL # BLD AUTO: 0.27 10*3/MM3 (ref 0–0.4)
EOSINOPHIL NFR BLD AUTO: 4 % (ref 0.3–6.2)
ERYTHROCYTE [DISTWIDTH] IN BLOOD BY AUTOMATED COUNT: 13.4 % (ref 12.3–15.4)
GFR SERPL CREATININE-BSD FRML MDRD: 72 ML/MIN/1.73
GLOBULIN UR ELPH-MCNC: 2.4 GM/DL
GLUCOSE BLD-MCNC: 92 MG/DL (ref 65–99)
HCT VFR BLD AUTO: 50.3 % (ref 34–46.6)
HDLC SERPL-MCNC: 103 MG/DL (ref 40–60)
HGB BLD-MCNC: 16.6 G/DL (ref 12–15.9)
IMM GRANULOCYTES # BLD AUTO: 0.03 10*3/MM3 (ref 0–0.05)
IMM GRANULOCYTES NFR BLD AUTO: 0.4 % (ref 0–0.5)
LDLC SERPL CALC-MCNC: 80 MG/DL (ref 0–100)
LDLC/HDLC SERPL: 0.77 {RATIO}
LYMPHOCYTES # BLD AUTO: 2.73 10*3/MM3 (ref 0.7–3.1)
LYMPHOCYTES NFR BLD AUTO: 40 % (ref 19.6–45.3)
MCH RBC QN AUTO: 30.2 PG (ref 26.6–33)
MCHC RBC AUTO-ENTMCNC: 33 G/DL (ref 31.5–35.7)
MCV RBC AUTO: 91.6 FL (ref 79–97)
MONOCYTES # BLD AUTO: 0.37 10*3/MM3 (ref 0.1–0.9)
MONOCYTES NFR BLD AUTO: 5.4 % (ref 5–12)
NEUTROPHILS # BLD AUTO: 3.36 10*3/MM3 (ref 1.7–7)
NEUTROPHILS NFR BLD AUTO: 49.3 % (ref 42.7–76)
NRBC BLD AUTO-RTO: 0 /100 WBC (ref 0–0.2)
PLATELET # BLD AUTO: 285 10*3/MM3 (ref 140–450)
PMV BLD AUTO: 11 FL (ref 6–12)
POTASSIUM BLD-SCNC: 4.6 MMOL/L (ref 3.5–5.2)
PROT SERPL-MCNC: 7.1 G/DL (ref 6–8.5)
RBC # BLD AUTO: 5.49 10*6/MM3 (ref 3.77–5.28)
SODIUM BLD-SCNC: 141 MMOL/L (ref 136–145)
TRIGL SERPL-MCNC: 102 MG/DL (ref 0–150)
TSH SERPL DL<=0.05 MIU/L-ACNC: 3.09 UIU/ML (ref 0.27–4.2)
VLDLC SERPL-MCNC: 20.4 MG/DL (ref 5–40)
WBC NRBC COR # BLD: 6.82 10*3/MM3 (ref 3.4–10.8)

## 2020-03-23 PROCEDURE — 80061 LIPID PANEL: CPT | Performed by: NURSE PRACTITIONER

## 2020-03-23 PROCEDURE — 85025 COMPLETE CBC W/AUTO DIFF WBC: CPT | Performed by: NURSE PRACTITIONER

## 2020-03-23 PROCEDURE — 84443 ASSAY THYROID STIM HORMONE: CPT | Performed by: NURSE PRACTITIONER

## 2020-03-23 PROCEDURE — 80053 COMPREHEN METABOLIC PANEL: CPT | Performed by: NURSE PRACTITIONER

## 2020-03-23 PROCEDURE — 36415 COLL VENOUS BLD VENIPUNCTURE: CPT | Performed by: NURSE PRACTITIONER

## 2020-03-24 ENCOUNTER — OFFICE VISIT (OUTPATIENT)
Dept: INTERNAL MEDICINE | Facility: CLINIC | Age: 85
End: 2020-03-24

## 2020-03-24 VITALS
OXYGEN SATURATION: 97 % | DIASTOLIC BLOOD PRESSURE: 80 MMHG | SYSTOLIC BLOOD PRESSURE: 124 MMHG | BODY MASS INDEX: 29.96 KG/M2 | HEART RATE: 68 BPM | TEMPERATURE: 98.1 F | WEIGHT: 169.1 LBS | HEIGHT: 63 IN

## 2020-03-24 DIAGNOSIS — I10 ESSENTIAL HYPERTENSION: ICD-10-CM

## 2020-03-24 DIAGNOSIS — E03.9 HYPOTHYROIDISM, UNSPECIFIED TYPE: Primary | ICD-10-CM

## 2020-03-24 DIAGNOSIS — R71.8 ELEVATED HEMATOCRIT: ICD-10-CM

## 2020-03-24 DIAGNOSIS — E78.5 HYPERLIPIDEMIA, UNSPECIFIED HYPERLIPIDEMIA TYPE: ICD-10-CM

## 2020-03-24 PROBLEM — H04.123 DRY EYE SYNDROME OF BILATERAL LACRIMAL GLANDS: Status: ACTIVE | Noted: 2020-03-24

## 2020-03-24 PROBLEM — H25.043 POSTERIOR SUBCAPSULAR AGE-RELATED CATARACT OF BOTH EYES: Status: ACTIVE | Noted: 2020-03-24

## 2020-03-24 LAB
BASOPHILS # BLD AUTO: 0.08 10*3/MM3 (ref 0–0.2)
BASOPHILS NFR BLD AUTO: 0.7 % (ref 0–1.5)
EOSINOPHIL # BLD AUTO: 0.22 10*3/MM3 (ref 0–0.4)
EOSINOPHIL NFR BLD AUTO: 2.1 % (ref 0.3–6.2)
ERYTHROCYTE [DISTWIDTH] IN BLOOD BY AUTOMATED COUNT: 12.7 % (ref 12.3–15.4)
FERRITIN SERPL-MCNC: 53 NG/ML (ref 13–150)
HCT VFR BLD AUTO: 48 % (ref 34–46.6)
HGB BLD-MCNC: 16 G/DL (ref 12–15.9)
IMM GRANULOCYTES # BLD AUTO: 0.04 10*3/MM3 (ref 0–0.05)
IMM GRANULOCYTES NFR BLD AUTO: 0.4 % (ref 0–0.5)
LYMPHOCYTES # BLD AUTO: 3.92 10*3/MM3 (ref 0.7–3.1)
LYMPHOCYTES NFR BLD AUTO: 36.7 % (ref 19.6–45.3)
MCH RBC QN AUTO: 30 PG (ref 26.6–33)
MCHC RBC AUTO-ENTMCNC: 33.3 G/DL (ref 31.5–35.7)
MCV RBC AUTO: 89.9 FL (ref 79–97)
MONOCYTES # BLD AUTO: 0.76 10*3/MM3 (ref 0.1–0.9)
MONOCYTES NFR BLD AUTO: 7.1 % (ref 5–12)
NEUTROPHILS # BLD AUTO: 5.66 10*3/MM3 (ref 1.7–7)
NEUTROPHILS NFR BLD AUTO: 53 % (ref 42.7–76)
NRBC BLD AUTO-RTO: 0 /100 WBC (ref 0–0.2)
PLATELET # BLD AUTO: 287 10*3/MM3 (ref 140–450)
RBC # BLD AUTO: 5.34 10*6/MM3 (ref 3.77–5.28)
WBC # BLD AUTO: 10.68 10*3/MM3 (ref 3.4–10.8)

## 2020-03-24 PROCEDURE — 99214 OFFICE O/P EST MOD 30 MIN: CPT | Performed by: NURSE PRACTITIONER

## 2020-03-24 NOTE — PROGRESS NOTES
Subjective   Isabel Handy is a 84 y.o. female.      History of Present Illness   The patient is here today to F/U on lab work. She is feeling well, eating more and moving less.     HTN-Pt is doing well with current medication regimen, denies adverse reactions, compliant with medication schedule.   Hypothyroidism-Pt is doing well with current medication regimen, denies adverse reactions, compliant with medication schedule.   Occ nausea- uses promethazine with good relief.     The following portions of the patient's history were reviewed and updated as appropriate: allergies, current medications, past family history, past medical history, past social history, past surgical history and problem list.    Review of Systems   Constitutional: Negative for chills and fever.   Respiratory: Negative.    Cardiovascular: Positive for leg swelling.   Psychiatric/Behavioral: Positive for sleep disturbance. Negative for dysphoric mood and suicidal ideas. The patient is not nervous/anxious.        Objective   Physical Exam   Constitutional: She appears well-developed and well-nourished.   Neck: Normal range of motion. Neck supple. No thyromegaly present.   Cardiovascular: Normal rate, regular rhythm, normal heart sounds and intact distal pulses.   Trace bilateral ankle edema   Pulmonary/Chest: Effort normal and breath sounds normal.   Lymphadenopathy:     She has no cervical adenopathy.   Skin: Skin is warm and dry.   Psychiatric: She has a normal mood and affect. Her behavior is normal. Judgment and thought content normal.       Vitals:    03/24/20 0954   BP: 124/80   Pulse: 68   Temp: 98.1 °F (36.7 °C)   SpO2: 97%     Body mass index is 29.96 kg/m².      Assessment/Plan   Isabel was seen today for hyperlipidemia and hypothyroidism.    Diagnoses and all orders for this visit:    Hypothyroidism, unspecified type    Elevated hematocrit  -     CBC & Differential  -     Ferritin    Essential hypertension    Hyperlipidemia,  unspecified hyperlipidemia type               1. Hypothyroidism- continue same  2. Elevated hemoglobin- not new to patient but higher than usual, recheck today (or if unable to get blood ok for 1 month from now), hydrate well, if needed could consider giving blood/seeing hematologist. Check ferritin, may need to check HH if elevated.   3. HTN- well controlled  4. HPL- continue same    Pt opts to stop ASA.

## 2020-04-27 DIAGNOSIS — E03.9 HYPOTHYROIDISM, UNSPECIFIED TYPE: ICD-10-CM

## 2020-04-27 RX ORDER — LEVOTHYROXINE SODIUM 0.07 MG/1
75 TABLET ORAL DAILY
Qty: 90 TABLET | Refills: 1 | Status: SHIPPED | OUTPATIENT
Start: 2020-04-27 | End: 2020-11-25 | Stop reason: SDUPTHER

## 2020-05-07 ENCOUNTER — OFFICE VISIT (OUTPATIENT)
Dept: INTERNAL MEDICINE | Facility: CLINIC | Age: 85
End: 2020-05-07

## 2020-05-07 VITALS
TEMPERATURE: 97.9 F | WEIGHT: 168 LBS | HEIGHT: 63 IN | BODY MASS INDEX: 29.77 KG/M2 | HEART RATE: 70 BPM | OXYGEN SATURATION: 97 % | DIASTOLIC BLOOD PRESSURE: 66 MMHG | SYSTOLIC BLOOD PRESSURE: 124 MMHG

## 2020-05-07 DIAGNOSIS — N30.00 ACUTE CYSTITIS WITHOUT HEMATURIA: Primary | ICD-10-CM

## 2020-05-07 LAB
BILIRUB BLD-MCNC: NEGATIVE MG/DL
CLARITY, POC: CLEAR
COLOR UR: YELLOW
GLUCOSE UR STRIP-MCNC: NEGATIVE MG/DL
KETONES UR QL: NEGATIVE
LEUKOCYTE EST, POC: NEGATIVE
NITRITE UR-MCNC: NEGATIVE MG/ML
PH UR: 5.5 [PH] (ref 5–8)
PROT UR STRIP-MCNC: NEGATIVE MG/DL
RBC # UR STRIP: NEGATIVE /UL
SP GR UR: 1 (ref 1–1.03)
UROBILINOGEN UR QL: NORMAL

## 2020-05-07 PROCEDURE — 99213 OFFICE O/P EST LOW 20 MIN: CPT | Performed by: NURSE PRACTITIONER

## 2020-05-07 PROCEDURE — 81003 URINALYSIS AUTO W/O SCOPE: CPT | Performed by: NURSE PRACTITIONER

## 2020-05-07 NOTE — PROGRESS NOTES
Subjective   Isabel Handy is a 84 y.o. female.     History of Present Illness    The patient is here today with c/o UTI symptoms. She had recently been treated with an antibiotic from [facility] [place]. She had some continued pressure and irritation with urinating. After a few more days the symptoms completely resolved. Took Bactrim.     The following portions of the patient's history were reviewed and updated as appropriate: allergies, current medications, past family history, past medical history, past social history, past surgical history and problem list.    Review of Systems   Constitutional: Negative.    Respiratory: Negative.    Cardiovascular: Negative.    Genitourinary: Negative.        Objective   Physical Exam   Constitutional: She appears well-developed and well-nourished.   Neck: Normal range of motion. Neck supple. No thyromegaly present.   Cardiovascular: Normal rate, regular rhythm, normal heart sounds and intact distal pulses.   Pulmonary/Chest: Effort normal and breath sounds normal.   Lymphadenopathy:     She has no cervical adenopathy.   Skin: Skin is warm and dry.   Psychiatric: She has a normal mood and affect. Her behavior is normal. Judgment and thought content normal.       Vitals:    05/07/20 1113   BP: 124/66   Pulse: 70   Temp: 97.9 °F (36.6 °C)   SpO2: 97%     Body mass index is 29.77 kg/m².      Assessment/Plan   Diagnoses and all orders for this visit:    Acute cystitis without hematuria                 1. UTI- resolved. UA neg

## 2020-05-11 DIAGNOSIS — I10 ESSENTIAL HYPERTENSION: ICD-10-CM

## 2020-05-11 RX ORDER — ATORVASTATIN CALCIUM 20 MG/1
20 TABLET, FILM COATED ORAL NIGHTLY
Qty: 90 TABLET | Refills: 1 | Status: SHIPPED | OUTPATIENT
Start: 2020-05-11 | End: 2020-11-10 | Stop reason: SDUPTHER

## 2020-05-11 RX ORDER — LISINOPRIL 20 MG/1
TABLET ORAL
Qty: 90 TABLET | Refills: 1 | Status: SHIPPED | OUTPATIENT
Start: 2020-05-11 | End: 2020-11-10 | Stop reason: SDUPTHER

## 2020-06-18 DIAGNOSIS — K21.9 GASTROESOPHAGEAL REFLUX DISEASE, ESOPHAGITIS PRESENCE NOT SPECIFIED: ICD-10-CM

## 2020-06-18 DIAGNOSIS — R11.0 NAUSEA: ICD-10-CM

## 2020-06-18 RX ORDER — PROMETHAZINE HYDROCHLORIDE 25 MG/1
TABLET ORAL
Qty: 30 TABLET | Refills: 0 | Status: SHIPPED | OUTPATIENT
Start: 2020-06-18 | End: 2021-03-30 | Stop reason: SDUPTHER

## 2020-06-18 RX ORDER — ESTRADIOL 0.5 MG/1
TABLET ORAL
Qty: 90 TABLET | Refills: 1 | Status: SHIPPED | OUTPATIENT
Start: 2020-06-18 | End: 2020-12-21

## 2020-07-16 NOTE — PROGRESS NOTES
Subjective   Isabel Handy is a 81 y.o. female.     History of Present Illness   The patient is here today to follow-up on lung nodule found on recent chest x-ray.  Has been to urgent care 3 times in January for influenza, cystitis, candidiasis.  She did not take Tamiflu.   The following portions of the patient's history were reviewed and updated as appropriate: allergies, current medications, past family history, past medical history, past social history, past surgical history and problem list.    Review of Systems   Constitutional: Positive for fatigue. Negative for fever.   HENT: Positive for postnasal drip. Negative for rhinorrhea.    Respiratory: Positive for cough.    Cardiovascular: Negative.        Objective   Physical Exam   Constitutional: She appears well-developed and well-nourished.   Neck: Normal range of motion. Neck supple. No thyromegaly present.   Cardiovascular: Normal rate, regular rhythm, normal heart sounds and intact distal pulses.    Pulmonary/Chest: Effort normal and breath sounds normal.   Skin: Skin is warm and dry.   Psychiatric: She has a normal mood and affect. Her behavior is normal. Judgment and thought content normal.       Assessment/Plan   Isabel was seen today for annual exam and uri.    Diagnoses and all orders for this visit:    Lung nodule  -     CT Chest With Contrast; Future  -     Basic Metabolic Panel    Bronchitis  -     benzonatate (TESSALON) 200 MG capsule; Take 1 capsule by mouth 3 (Three) Times a Day As Needed for cough.        1.  Lung nodule-order CT chest with contrast, check BMP today  2.  Bronchitis-lungs clear, probably resolving bronchitis.  Recommend Mucinex OTC and will give Rx for Tessalon Perles.        Name band;

## 2020-09-21 ENCOUNTER — APPOINTMENT (OUTPATIENT)
Dept: CT IMAGING | Facility: HOSPITAL | Age: 85
End: 2020-09-21

## 2020-09-21 ENCOUNTER — HOSPITAL ENCOUNTER (EMERGENCY)
Facility: HOSPITAL | Age: 85
Discharge: HOME | End: 2020-09-21
Attending: EMERGENCY MEDICINE | Admitting: EMERGENCY MEDICINE

## 2020-09-21 VITALS
HEART RATE: 77 BPM | DIASTOLIC BLOOD PRESSURE: 59 MMHG | OXYGEN SATURATION: 98 % | RESPIRATION RATE: 16 BRPM | SYSTOLIC BLOOD PRESSURE: 130 MMHG | TEMPERATURE: 97.6 F

## 2020-09-21 DIAGNOSIS — R42 DIZZINESS: ICD-10-CM

## 2020-09-21 DIAGNOSIS — G44.209 ACUTE NON INTRACTABLE TENSION-TYPE HEADACHE: Primary | ICD-10-CM

## 2020-09-21 LAB
ALBUMIN SERPL-MCNC: 4.2 G/DL (ref 3.5–5.2)
ALBUMIN/GLOB SERPL: 1.6 G/DL
ALP SERPL-CCNC: 74 U/L (ref 39–117)
ALT SERPL W P-5'-P-CCNC: 14 U/L (ref 1–33)
ANION GAP SERPL CALCULATED.3IONS-SCNC: 11.8 MMOL/L (ref 5–15)
AST SERPL-CCNC: 20 U/L (ref 1–32)
BASOPHILS # BLD AUTO: 0.06 10*3/MM3 (ref 0–0.2)
BASOPHILS NFR BLD AUTO: 0.7 % (ref 0–1.5)
BILIRUB SERPL-MCNC: 0.3 MG/DL (ref 0–1.2)
BILIRUB UR QL STRIP: NEGATIVE
BUN SERPL-MCNC: 21 MG/DL (ref 8–23)
BUN/CREAT SERPL: 17.5 (ref 7–25)
CALCIUM SPEC-SCNC: 9.4 MG/DL (ref 8.6–10.5)
CHLORIDE SERPL-SCNC: 102 MMOL/L (ref 98–107)
CLARITY UR: CLEAR
CO2 SERPL-SCNC: 22.2 MMOL/L (ref 22–29)
COLOR UR: YELLOW
CREAT SERPL-MCNC: 1.2 MG/DL (ref 0.57–1)
DEPRECATED RDW RBC AUTO: 42.7 FL (ref 37–54)
EOSINOPHIL # BLD AUTO: 0.07 10*3/MM3 (ref 0–0.4)
EOSINOPHIL NFR BLD AUTO: 0.8 % (ref 0.3–6.2)
ERYTHROCYTE [DISTWIDTH] IN BLOOD BY AUTOMATED COUNT: 12.7 % (ref 12.3–15.4)
GFR SERPL CREATININE-BSD FRML MDRD: 43 ML/MIN/1.73
GLOBULIN UR ELPH-MCNC: 2.6 GM/DL
GLUCOSE SERPL-MCNC: 108 MG/DL (ref 65–99)
GLUCOSE UR STRIP-MCNC: NEGATIVE MG/DL
HCT VFR BLD AUTO: 48.9 % (ref 34–46.6)
HGB BLD-MCNC: 16.1 G/DL (ref 12–15.9)
HGB UR QL STRIP.AUTO: NEGATIVE
IMM GRANULOCYTES # BLD AUTO: 0.04 10*3/MM3 (ref 0–0.05)
IMM GRANULOCYTES NFR BLD AUTO: 0.4 % (ref 0–0.5)
KETONES UR QL STRIP: NEGATIVE
LEUKOCYTE ESTERASE UR QL STRIP.AUTO: NEGATIVE
LYMPHOCYTES # BLD AUTO: 1.27 10*3/MM3 (ref 0.7–3.1)
LYMPHOCYTES NFR BLD AUTO: 14.2 % (ref 19.6–45.3)
MCH RBC QN AUTO: 30 PG (ref 26.6–33)
MCHC RBC AUTO-ENTMCNC: 32.9 G/DL (ref 31.5–35.7)
MCV RBC AUTO: 91.2 FL (ref 79–97)
MONOCYTES # BLD AUTO: 0.52 10*3/MM3 (ref 0.1–0.9)
MONOCYTES NFR BLD AUTO: 5.8 % (ref 5–12)
NEUTROPHILS NFR BLD AUTO: 6.99 10*3/MM3 (ref 1.7–7)
NEUTROPHILS NFR BLD AUTO: 78.1 % (ref 42.7–76)
NITRITE UR QL STRIP: NEGATIVE
NRBC BLD AUTO-RTO: 0 /100 WBC (ref 0–0.2)
PH UR STRIP.AUTO: 6.5 [PH] (ref 5–8)
PLATELET # BLD AUTO: 280 10*3/MM3 (ref 140–450)
PMV BLD AUTO: 11.2 FL (ref 6–12)
POTASSIUM SERPL-SCNC: 4.7 MMOL/L (ref 3.5–5.2)
PROT SERPL-MCNC: 6.8 G/DL (ref 6–8.5)
PROT UR QL STRIP: NEGATIVE
RBC # BLD AUTO: 5.36 10*6/MM3 (ref 3.77–5.28)
SODIUM SERPL-SCNC: 136 MMOL/L (ref 136–145)
SP GR UR STRIP: <1.005 (ref 1–1.03)
TROPONIN T SERPL-MCNC: <0.01 NG/ML (ref 0–0.03)
UROBILINOGEN UR QL STRIP: ABNORMAL
WBC # BLD AUTO: 8.95 10*3/MM3 (ref 3.4–10.8)

## 2020-09-21 PROCEDURE — 99283 EMERGENCY DEPT VISIT LOW MDM: CPT

## 2020-09-21 PROCEDURE — 93005 ELECTROCARDIOGRAM TRACING: CPT | Performed by: EMERGENCY MEDICINE

## 2020-09-21 PROCEDURE — 36415 COLL VENOUS BLD VENIPUNCTURE: CPT

## 2020-09-21 PROCEDURE — 81003 URINALYSIS AUTO W/O SCOPE: CPT | Performed by: EMERGENCY MEDICINE

## 2020-09-21 PROCEDURE — 70450 CT HEAD/BRAIN W/O DYE: CPT

## 2020-09-21 PROCEDURE — 25010000002 KETOROLAC TROMETHAMINE PER 15 MG: Performed by: EMERGENCY MEDICINE

## 2020-09-21 PROCEDURE — 84484 ASSAY OF TROPONIN QUANT: CPT | Performed by: EMERGENCY MEDICINE

## 2020-09-21 PROCEDURE — 96374 THER/PROPH/DIAG INJ IV PUSH: CPT

## 2020-09-21 PROCEDURE — 80053 COMPREHEN METABOLIC PANEL: CPT | Performed by: EMERGENCY MEDICINE

## 2020-09-21 PROCEDURE — 93010 ELECTROCARDIOGRAM REPORT: CPT | Performed by: INTERNAL MEDICINE

## 2020-09-21 PROCEDURE — 85025 COMPLETE CBC W/AUTO DIFF WBC: CPT | Performed by: EMERGENCY MEDICINE

## 2020-09-21 RX ORDER — HYDROCODONE BITARTRATE AND ACETAMINOPHEN 5; 325 MG/1; MG/1
1 TABLET ORAL EVERY 6 HOURS PRN
Status: DISCONTINUED | OUTPATIENT
Start: 2020-09-21 | End: 2020-09-21 | Stop reason: HOSPADM

## 2020-09-21 RX ORDER — SODIUM CHLORIDE 0.9 % (FLUSH) 0.9 %
10 SYRINGE (ML) INJECTION AS NEEDED
Status: DISCONTINUED | OUTPATIENT
Start: 2020-09-21 | End: 2020-09-21 | Stop reason: HOSPADM

## 2020-09-21 RX ORDER — KETOROLAC TROMETHAMINE 15 MG/ML
7.5 INJECTION, SOLUTION INTRAMUSCULAR; INTRAVENOUS ONCE
Status: COMPLETED | OUTPATIENT
Start: 2020-09-21 | End: 2020-09-21

## 2020-09-21 RX ADMIN — HYDROCODONE BITARTRATE AND ACETAMINOPHEN 1 TABLET: 5; 325 TABLET ORAL at 17:32

## 2020-09-21 RX ADMIN — KETOROLAC TROMETHAMINE 7.5 MG: 15 INJECTION, SOLUTION INTRAMUSCULAR; INTRAVENOUS at 14:22

## 2020-09-21 NOTE — ED NOTES
Pt presents to ED via EMS form home. Pt states at 1045 she began having pain at the base of her head and difficulty getting the correct words out. Pt states she was fine this morning when she woke up and was doing errands. Pt is currently A&Ox$, able to ambulate, and in a mask at this time. Pt denies the use of blood thinners.      Jose Crump, RN  09/21/20 9801

## 2020-09-21 NOTE — ED PROVIDER NOTES
" EMERGENCY DEPARTMENT ENCOUNTER    Room Number:  20/20  Date of encounter:  9/21/2020  PCP: Beth Figueredo APRN  Historian: Patient and son    I used full protective equipment while examining this patient.  This includes face mask, gloves and protective eyewear.  I washed my hands before entering the room and immediately upon leaving the room.  Patient was wearing a surgical mask.      HPI:  Chief Complaint: Posterior headache  A complete HPI/ROS/PMH/PSH/SH/FH are unobtainable due to: None    Context: Isabel Handy is a 84 y.o. female who presents to the ED c/o gradual onset posterior headache that began around 10 AM this morning.  Pain is described as dull, achy and a \"tired feeling\".  Headache is moderate in intensity.  There are no aggravating or alleviating factors.  Patient denies nausea, vomiting, fever, chills, chest pain, shortness of breath, numbness/tingling, focal weakness, or recent illness.  Patient states that after the headache began, she bent over and when she stood back up, she felt lightheaded and \"weak all over\".  She also felt shaky and felt like she was having trouble getting her words out.  Her son states that he talked her on the phone while this was going on and that she did not have any slurred speech or word finding difficulties.  He said her voice was \"a little shaky\".  Patient is currently on antibiotics for a UTI.      PAST MEDICAL HISTORY  Active Ambulatory Problems     Diagnosis Date Noted   • Gastroesophageal reflux disease 03/15/2016   • Menopausal syndrome 03/15/2016   • Osteoarthritis of cervical spine 03/15/2016   • Carotid atherosclerosis 03/15/2016   • Prediabetes 03/15/2016   • Low back pain 03/15/2016   • Insomnia 03/15/2016   • Hypothyroidism 03/15/2016   • Essential hypertension 03/15/2016   • Hyperlipidemia 03/15/2016   • Allergic rhinitis 03/15/2016   • Palpitations 03/15/2016   • Anxiety 04/12/2016   • PVC's (premature ventricular contractions) 04/12/2016   • Vitamin D " deficiency 10/18/2016   • Lung nodule 02/03/2017   • Hyperglycemia 04/04/2017   • Occipital pain 09/05/2018   • Malaise and fatigue 09/05/2018   • Acute nonintractable headache 09/05/2018   • Elevated hematocrit 03/19/2019   • Posterior subcapsular age-related cataract of both eyes 03/24/2020   • Dry eye syndrome of bilateral lacrimal glands 03/24/2020     Resolved Ambulatory Problems     Diagnosis Date Noted   • Coxitis 03/15/2016   • Hiatal hernia 03/15/2016   • Shortness of breath 03/15/2016   • Edema 05/16/2016     Past Medical History:   Diagnosis Date   • Arrhythmia    • GERD (gastroesophageal reflux disease)    • Hypertension    • Skin cancer    • Subarachnoid hemorrhage (CMS/HCC)    • Vasodepressor syncope          PAST SURGICAL HISTORY  Past Surgical History:   Procedure Laterality Date   • APPENDECTOMY N/A    • BLEPHAROPLASTY Bilateral    • BREAST CYST EXCISION      to remove cysts   • BREAST IMPLANT SURGERY Bilateral 05/20/2002    Dr. Marissa Cobos, [facility]   • BUNIONECTOMY Bilateral    • CATARACT EXTRACTION Bilateral    • COLONOSCOPY N/A 9/21/2018    Procedure: COLONOSCOPY into cecum and T.I. with cold biopsy polypectomy;  Surgeon: Roly Be MD;  Location: [facility] ENDOSCOPY;  Service: Gastroenterology   • CORRECTION HAMMER TOE Left 12/09/2013    Revision correction through an altered surgical field of the left second hammertoe, correciton of the left third hammertoe, DuVries plantar condylectomy of the left second metatarsal, Plantar DuVries condylectomy of the left third metatarsal, partial excision of the left fifth metatarsal head for alleviation of bunionette deformity-Dr. Jordan Montilla, [facility]   • ENDOSCOPY N/A 9/21/2018    Procedure: ESOPHAGOGASTRODUODENOSCOPY with esophageal dilation #54 Castellanos;  Surgeon: Roly Be MD;  Location: [facility] ENDOSCOPY;  Service: Gastroenterology   • ENDOSCOPY AND COLONOSCOPY N/A 12/29/2010    Hiatal hernia, chronic gastritis: biopsied, normal examined  duodenum, normal examined ileum, mild diverticulosis sigmoid colon-Dr. Roly Be, [facility]   • TOTAL ABDOMINAL HYSTERECTOMY Bilateral          FAMILY HISTORY  Family History   Problem Relation Age of Onset   • Hypertension Mother    • Heart disease Mother    • Arthritis Mother    • Colon cancer Mother         Colon   • Colon polyps Mother    • Hypertension Father    • Heart disease Father    • Prostate cancer Father    • COPD Sister    • Hypertension Brother    • Heart disease Brother          of heart attack (2 brothers)   • Tuberculosis Brother    • Diabetes Paternal Aunt          SOCIAL HISTORY  Social History     Socioeconomic History   • Marital status:      Spouse name: Not on file   • Number of children: Not on file   • Years of education: Not on file   • Highest education level: Not on file   Tobacco Use   • Smoking status: Former Smoker     Quit date: 10/10/1994     Years since quittin.9   • Smokeless tobacco: Never Used   • Tobacco comment: CAFFEINE USE: 1 CUP DAILY.    Substance and Sexual Activity   • Alcohol use: Yes     Alcohol/week: 1.0 standard drinks     Types: 1 Glasses of wine per week     Comment: RARE   • Drug use: No   • Sexual activity: Defer         ALLERGIES  Gatifloxacin and Rosuvastatin       REVIEW OF SYSTEMS  Review of Systems      All systems have been reviewed and are negative except as as discussed in the HPI    PHYSICAL EXAM    I have reviewed the triage vital signs and nursing notes.    ED Triage Vitals [20 1234]   Temp Heart Rate Resp BP SpO2   97.6 °F (36.4 °C) 96 16 162/80 99 %      Temp src Heart Rate Source Patient Position BP Location FiO2 (%)   Tympanic Monitor Sitting Right arm --       Physical Exam  GENERAL: Awake, alert, no acute distress, oriented x3  HENT: NCAT, nares patent, moist mucous membranes, oropharynx is benign  NECK: supple, no lymphadenopathy, nontender, no meningismus  EYES: Extraocular muscles intact, pupils reactive to light bilaterally,  no nystagmus  CV: regular rhythm, regular rate, no murmur  RESPIRATORY: normal effort, clear to auscultation bilaterally  ABDOMEN: soft, nontender, nondistended  MUSCULOSKELETAL: Extremities are nontender and without obvious deformity.  There is normal range of motion in all extremities.  There is no calf tenderness or pedal edema  NEURO: Speech is clear and fluent.  No aphasia.  No facial droop.  Tongue protrudes midline.  Normal strength and light touch sensation in bilateral face and all extremities.  SKIN: warm, dry, no rash  PSYCH: Normal mood and affect      LAB RESULTS  Recent Results (from the past 24 hour(s))   CBC Auto Differential    Collection Time: 09/21/20  1:59 PM    Specimen: Blood   Result Value Ref Range    WBC 8.95 3.40 - 10.80 10*3/mm3    RBC 5.36 (H) 3.77 - 5.28 10*6/mm3    Hemoglobin 16.1 (H) 12.0 - 15.9 g/dL    Hematocrit 48.9 (H) 34.0 - 46.6 %    MCV 91.2 79.0 - 97.0 fL    MCH 30.0 26.6 - 33.0 pg    MCHC 32.9 31.5 - 35.7 g/dL    RDW 12.7 12.3 - 15.4 %    RDW-SD 42.7 37.0 - 54.0 fl    MPV 11.2 6.0 - 12.0 fL    Platelets 280 140 - 450 10*3/mm3    Neutrophil % 78.1 (H) 42.7 - 76.0 %    Lymphocyte % 14.2 (L) 19.6 - 45.3 %    Monocyte % 5.8 5.0 - 12.0 %    Eosinophil % 0.8 0.3 - 6.2 %    Basophil % 0.7 0.0 - 1.5 %    Immature Grans % 0.4 0.0 - 0.5 %    Neutrophils, Absolute 6.99 1.70 - 7.00 10*3/mm3    Lymphocytes, Absolute 1.27 0.70 - 3.10 10*3/mm3    Monocytes, Absolute 0.52 0.10 - 0.90 10*3/mm3    Eosinophils, Absolute 0.07 0.00 - 0.40 10*3/mm3    Basophils, Absolute 0.06 0.00 - 0.20 10*3/mm3    Immature Grans, Absolute 0.04 0.00 - 0.05 10*3/mm3    nRBC 0.0 0.0 - 0.2 /100 WBC   Comprehensive Metabolic Panel    Collection Time: 09/21/20  2:58 PM    Specimen: Blood   Result Value Ref Range    Glucose 108 (H) 65 - 99 mg/dL    BUN 21 8 - 23 mg/dL    Creatinine 1.20 (H) 0.57 - 1.00 mg/dL    Sodium 136 136 - 145 mmol/L    Potassium 4.7 3.5 - 5.2 mmol/L    Chloride 102 98 - 107 mmol/L    CO2 22.2 22.0  - 29.0 mmol/L    Calcium 9.4 8.6 - 10.5 mg/dL    Total Protein 6.8 6.0 - 8.5 g/dL    Albumin 4.20 3.50 - 5.20 g/dL    ALT (SGPT) 14 1 - 33 U/L    AST (SGOT) 20 1 - 32 U/L    Alkaline Phosphatase 74 39 - 117 U/L    Total Bilirubin 0.3 0.0 - 1.2 mg/dL    eGFR Non African Amer 43 (L) >60 mL/min/1.73    Globulin 2.6 gm/dL    A/G Ratio 1.6 g/dL    BUN/Creatinine Ratio 17.5 7.0 - 25.0    Anion Gap 11.8 5.0 - 15.0 mmol/L   Troponin    Collection Time: 09/21/20  2:58 PM    Specimen: Blood   Result Value Ref Range    Troponin T <0.010 0.000 - 0.030 ng/mL   Urinalysis With Microscopic If Indicated (No Culture) - Urine, Clean Catch    Collection Time: 09/21/20  2:59 PM    Specimen: Urine, Clean Catch   Result Value Ref Range    Color, UA Yellow Yellow, Straw    Appearance, UA Clear Clear    pH, UA 6.5 5.0 - 8.0    Specific Gravity, UA <1.005 (L) 1.005 - 1.030    Glucose, UA Negative Negative    Ketones, UA Negative Negative    Bilirubin, UA Negative Negative    Blood, UA Negative Negative    Protein, UA Negative Negative    Leuk Esterase, UA Negative Negative    Nitrite, UA Negative Negative    Urobilinogen, UA 0.2 E.U./dL 0.2 - 1.0 E.U./dL       Ordered the above labs and independently reviewed the results.      RADIOLOGY  Ct Head Without Contrast    Result Date: 9/21/2020  EMERGENCY NONCONTRAST HEAD CT 09/21/2020  CLINICAL HISTORY: Posterior headache with headache at base of skull, difficulty getting words out, some dizziness.  TECHNIQUE: Spiral CT images were obtained from the base of the skull to the vertex without intravenous contrast. Images were reformatted and submitted in 3 mm thick axial CT section with brain algorithm and 2 mm thick sagittal and coronal reconstructions were performed and submitted in brain algorithm.  COMPARISON: This is correlated to prior MRI of the head as well as a head CT from [facility] 09/05/2018.  FINDINGS: There is some mild patchy low-density in the frontal periventricular  white matter consistent with mild small vessel disease. 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 The remainder of the brain parenchyma is normal in attenuation. The ventricles are normal in size. I see no focal mass effect, no midline shift and no extra-axial fluid collections are identified and there is no evidence of acute intracranial hemorrhage. The paranasal sinuses, mastoid air cells and middle ear cavities are clear. The calvarium and skull base are normal in appearance.      1. No change when compared to prior head CT 09/05/2018. 2. 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 The remainder of the head CT is within normal limits.   Radiation dose reduction techniques were utilized, including automated exposure control and exposure modulation based on body size.         I ordered the above noted radiological studies. Reviewed by me and discussed with radiologist.  See dictation for official radiology interpretation.      PROCEDURES  Procedures      MEDICATIONS GIVEN IN ER    Medications   sodium chloride 0.9 % flush 10 mL (has no administration in time range)   HYDROcodone-acetaminophen (NORCO) 5-325 MG per tablet 1 tablet (1 tablet Oral Given 9/21/20 1732)   ketorolac (TORADOL) injection 7.5 mg (7.5 mg Intravenous Given 9/21/20 1422)         PROGRESS, DATA ANALYSIS, CONSULTS, AND MEDICAL DECISION MAKING    All labs have been independently reviewed by me.  All radiology studies have been reviewed by me and discussed with radiologist dictating the report.   EKG's independently viewed and interpreted by me.  I have reviewed the nurse's notes, vital signs, past medical history, and medication list.  Discussion below represents my analysis of pertinent findings related to patient's  condition, differential diagnosis, treatment plan and final disposition.      ED Course as of Sep 21 1851   Mon Sep 21, 2020   1323 Old records reviewed.  Patient was seen in urgent care 3 days ago and diagnosed with a UTI.  She was given a prescription for Bactrim. Patient was admitted here in September 2018 for an acute intractable headache.  MRI and MRA of the head and neck were negative.    [WH]   1402 EKG          EKG time: 1354  Rhythm/Rate: Sinus rhythm, rate 76  P waves and AK: Normal  QRS, axis: Normal  ST and T waves: Normal    Interpreted Contemporaneously by me, independently viewed  EKG is not significantly changed compared to prior EKG done 9/5/2018      [WH]   1418 Results of the head CT discussed with Dr. Garcia (radiologist).  Images independently viewed by me.  Head CT is negative acute.    [WH]   1557 0.77 six months ago   Creatinine(!): 1.20 [WH]   1640 Per [facility], common reactions to Bactrim include dizziness and headache.        [WH]   1653 Test results discussed with the patient and her son.  She is resting comfortably.  She reports that she still having a mild posterior headache but that it has improved after IV Toradol.  Patient has been able to stand and ambulate without difficulty.  She did not get dizzy.  I advised the patient to stop taking Bactrim as her urinalysis is normal today.  The Bactrim may be contributing to her symptoms.  She will be discharged.  Patient was advised follow-up with her PCP if symptoms persist.      [WH]   1710 Patient's work-up was unremarkable except for mildly elevated creatinine.  Head CT was negative acute.  She had a normal neuro exam.  Her symptoms were not suggestive of a TIA or stroke.  Patient is on Bactrim which can cause dizziness and headache.  Patient was advised to discontinue this.  Return precautions were discussed.    [WH]      ED Course User Index  [WH] Catracho Gordillo MD       AS OF 18:51 EDT VITALS:    BP - 130/59  HR - 77  TEMP - 97.6  °F (36.4 °C) (Tympanic)  O2 SATS - 98%      DIAGNOSIS  Final diagnoses:   Acute non intractable tension-type headache   Dizziness  Mild renal insufficiency         DISPOSITION  Discharge    DISCHARGE    Patient discharged in stable condition.    Reviewed implications of results, diagnosis, meds, responsibility to follow up, warning signs and symptoms of possible worsening, potential complications and reasons to return to ER, including worsening symptoms, persistent headache, or other concern.    Patient/Family voiced understanding of above instructions.    Discussed plan for discharge, as there is no emergent indication for admission. Patient referred to primary care provider for BP management due to today's BP. Pt/family is agreeable and understands need for follow up and repeat testing.  Pt is aware that discharge does not mean that nothing is wrong but it indicates no emergency is present that requires admission and they must continue care with follow-up as given below or physician of their choice.     FOLLOW-UP  Beth Figueredo, APRN  [address]  [place] [address]  [phone]    Call in 2 days  If symptoms persist         Medication List      No changes were made to your prescriptions during this visit.           Dictated utilizing Dragon dictation:  Much of this encounter note is an electronic transcription/translation of spoken language to printed text. The electronic translation of spoken language may permit erroneous, or at times, nonsensical words or phrases to be inadvertently transcribed; Although I have reviewed the note for such errors, some may still exist.     Catracho Gordillo MD  09/21/20 6196

## 2020-09-21 NOTE — DISCHARGE INSTRUCTIONS
Take Tylenol as needed for headache.  Follow-up with your primary care provider later this week if symptoms persist.  Stop taking Bactrim (sulfamethoxazole trimethoprim).  Make sure you are drinking adequate amount of fluids daily.  Return to emergency department for worsening pain, nausea, vomiting, trouble walking, numbness/tingling/weakness in your arms or legs, or other concern.

## 2020-09-25 ENCOUNTER — TELEPHONE (OUTPATIENT)
Dept: INTERNAL MEDICINE | Facility: CLINIC | Age: 85
End: 2020-09-25

## 2020-09-25 NOTE — TELEPHONE ENCOUNTER
Sure.    ----- Message from ED Mcgee sent at 9/21/2020  1:39 PM EDT -----  Currently in the ER, will see what labs she has done there first.   ----- Message -----  From: Ana Lilia Arthur  Sent: 9/21/2020  11:14 AM EDT  To: ED Mcgee    PLEASE ADVISE FOR LABS/

## 2020-09-29 ENCOUNTER — TELEMEDICINE (OUTPATIENT)
Dept: INTERNAL MEDICINE | Facility: CLINIC | Age: 85
End: 2020-09-29

## 2020-09-29 VITALS
DIASTOLIC BLOOD PRESSURE: 64 MMHG | OXYGEN SATURATION: 97 % | BODY MASS INDEX: 28.34 KG/M2 | HEART RATE: 71 BPM | SYSTOLIC BLOOD PRESSURE: 130 MMHG | HEIGHT: 63 IN

## 2020-09-29 DIAGNOSIS — R10.2 PELVIC PRESSURE IN FEMALE: ICD-10-CM

## 2020-09-29 DIAGNOSIS — R41.3 MEMORY LOSS: ICD-10-CM

## 2020-09-29 DIAGNOSIS — R79.89 ELEVATED SERUM CREATININE: ICD-10-CM

## 2020-09-29 DIAGNOSIS — E78.5 HYPERLIPIDEMIA, UNSPECIFIED HYPERLIPIDEMIA TYPE: ICD-10-CM

## 2020-09-29 DIAGNOSIS — E03.9 HYPOTHYROIDISM, UNSPECIFIED TYPE: Primary | ICD-10-CM

## 2020-09-29 LAB
BILIRUB BLD-MCNC: NEGATIVE MG/DL
CLARITY, POC: CLEAR
COLOR UR: YELLOW
GLUCOSE UR STRIP-MCNC: NEGATIVE MG/DL
KETONES UR QL: NEGATIVE
LEUKOCYTE EST, POC: NEGATIVE
NITRITE UR-MCNC: NEGATIVE MG/ML
PH UR: 6 [PH] (ref 5–8)
PROT UR STRIP-MCNC: NEGATIVE MG/DL
RBC # UR STRIP: NEGATIVE /UL
SP GR UR: 1.01 (ref 1–1.03)
UROBILINOGEN UR QL: NORMAL

## 2020-09-29 PROCEDURE — 81003 URINALYSIS AUTO W/O SCOPE: CPT | Performed by: NURSE PRACTITIONER

## 2020-09-29 PROCEDURE — 99214 OFFICE O/P EST MOD 30 MIN: CPT | Performed by: NURSE PRACTITIONER

## 2020-09-29 NOTE — PROGRESS NOTES
Subjective   Isabel Handy is a 84 y.o. female.      History of Present Illness   The patient is here today to F/U on lab work. She is feeling better but still recovering. She is hydrating well.     HPL-Pt is doing well with current medication regimen, denies adverse reactions, compliant with medication schedule.   Hypothyroidism-Pt is doing well with current medication regimen, denies adverse reactions, compliant with medication schedule.     Reports some memory loss, does not get lost. Cant remember names. Not having trouble with paper work or bills.     Was seen in ER 9/21st at [facility] for c/o headache and fatigue. Lightheadedness as well upon standing.  CT head neg acute. Previous infarct noted. EKG NSR. Was being treated for UTI with bactrim. ER discussed poss SE to med. Creatinine slightly elevated.   Now with some continued symptoms of UTI, she has some urinary frequency. She denies burning or urgency. She does report some pelvic pressure. He does report hx of needing urethral dilation.   The following portions of the patient's history were reviewed and updated as appropriate: allergies, current medications, past family history, past medical history, past social history, past surgical history and problem list.    Review of Systems   Constitutional: Positive for fatigue. Negative for chills and fever.   Respiratory: Negative.    Cardiovascular: Negative.    Genitourinary: Positive for frequency and pelvic pressure. Negative for dysuria, flank pain and hematuria.   Psychiatric/Behavioral: Negative.        Objective   Physical Exam  Psychiatric:         Attention and Perception: Attention and perception normal.         Mood and Affect: Mood and affect normal.         Speech: Speech normal.         Behavior: Behavior normal. Behavior is cooperative.         Thought Content: Thought content normal.         Cognition and Memory: Cognition and memory normal.         Judgment: Judgment normal.         There were no  vitals filed for this visit.  Body mass index is 28.34 kg/m².      Assessment/Plan   Isbael was seen today for hypertension, hyperlipidemia, hypothyroidism and headache.    Diagnoses and all orders for this visit:    Hypothyroidism, unspecified type  -     TSH Rfx On Abnormal To Free T4    Elevated serum creatinine  -     Basic Metabolic Panel    Hyperlipidemia, unspecified hyperlipidemia type    Memory loss    Pelvic pressure in female  -     Ambulatory Referral to Urology      Visit via telephone, pt consents, visit lasted 15 minutes.          1. Hypothyroidism- check lab today  2. Pelvic pressure- bring urine sample for UA and will also put in referral to urology   3. Elevated creatinine- hydrate and recheck   4. HPL- will check at next appointment  5. Memory loss- check MMSE, pt defers     Flu vaccine- suggest today

## 2020-09-30 LAB
BUN SERPL-MCNC: 18 MG/DL (ref 8–23)
BUN/CREAT SERPL: 22.8 (ref 7–25)
CALCIUM SERPL-MCNC: 9.8 MG/DL (ref 8.6–10.5)
CHLORIDE SERPL-SCNC: 100 MMOL/L (ref 98–107)
CO2 SERPL-SCNC: 22.4 MMOL/L (ref 22–29)
CREAT SERPL-MCNC: 0.79 MG/DL (ref 0.57–1)
GLUCOSE SERPL-MCNC: 103 MG/DL (ref 65–99)
POTASSIUM SERPL-SCNC: 4.7 MMOL/L (ref 3.5–5.2)
SODIUM SERPL-SCNC: 135 MMOL/L (ref 136–145)
TSH SERPL DL<=0.005 MIU/L-ACNC: 2.37 UIU/ML (ref 0.27–4.2)

## 2020-10-27 NOTE — PROGRESS NOTES
Date of Office Visit: 10/28/2020  Encounter Provider: ED Walker  Place of Service: [facility]ARD[facility]  Patient Name: Isabel Handy  :1935  Primary Cardiologist: Dr. Mcmahon    CC:  Annual cardiac evaluation    Dear Beth    HPI: Isabel Handy is a pleasant 84 y.o. female who presents 10/28/2020 for cardiac follow up.     She was originally diagnosed with multiple PVCs 2016.  They actually woke her up in the morning.  She was referred to the chest pain unit.  Her troponin was negative and her TSH was well controlled.  She continued to feel ill and had some intermittent chest pain and pressure.  She had a dobutamine stress echocardiogram 3/16/2016.  This showed no evidence of ischemia.  Aortic valve had mild calcification.  Her baseline EF was 64%.  There was no other valvular disease.  Her chamber sizes were normal.  It was a normal stress echo.  She had a Holter performed 3/15/2016 that showed 100 PVCs per hour.  There were rare PACs.     She is a history of carotid stenosis diagnosed in the past.  She has thyroid disease which is normally control.  She is very strong family history of cardiac disease.  Both of her parents of myocardial infarctions and she's had 2 brothers with myocardial infarctions as well.     She had labs performed 2019 that revealed a total cholesterol 167, triglycerides 78, HDL 83, LDL 68.  TSH was within normal limits.  CMP was unremarkable.  Vitamin D 33.7.     Carotid study 2016 with carotid atheromatous plaque and no significant stenosis.   She had normal bilateral carotid ultrasounds performed 10/18.    She had labs performed in the ER earlier this month for possible reaction to an antibiotic that was prescribed for UTI.  Her work-up was unremarkable.  BMP showed a glucose of 103 and a sodium of 135, otherwise normal TSH 2.370.    She states that she feels good.  She denies any shortness of air, lower extremity edema,  "dizziness or lightheadedness.  She denies any chest pain or chest pressure.  She denies any fatigue.  She states at night she can hear her heart beating.  She does have known PVCs.  She states it does not really bother her, it does not feel irregular or fast is just the fact that she can \"hear\" her heart beating.  She is taking her medications as directed.  Her blood pressures well controlled.  She denies any unexplained bleeding, dark or tarry stools.  Past Medical History:   Diagnosis Date   • Arrhythmia    • GERD (gastroesophageal reflux disease)    • Hyperlipidemia    • Hypertension    • Hypothyroidism    • Skin cancer    • Subarachnoid hemorrhage (CMS/HCC)    • Vasodepressor syncope        Past Surgical History:   Procedure Laterality Date   • APPENDECTOMY N/A    • BLEPHAROPLASTY Bilateral    • BREAST CYST EXCISION      to remove cysts   • BREAST IMPLANT SURGERY Bilateral 05/20/2002    Dr. Marissa Cobos, [facility]   • BUNIONECTOMY Bilateral    • CATARACT EXTRACTION Bilateral    • COLONOSCOPY N/A 9/21/2018    Procedure: COLONOSCOPY into cecum and T.I. with cold biopsy polypectomy;  Surgeon: Roly Be MD;  Location: [facility] ENDOSCOPY;  Service: Gastroenterology   • CORRECTION HAMMER TOE Left 12/09/2013    Revision correction through an altered surgical field of the left second hammertoe, correciton of the left third hammertoe, DuVries plantar condylectomy of the left second metatarsal, Plantar DuVries condylectomy of the left third metatarsal, partial excision of the left fifth metatarsal head for alleviation of bunionette deformity-Dr. Jordan Montilla, [facility]   • ENDOSCOPY N/A 9/21/2018    Procedure: ESOPHAGOGASTRODUODENOSCOPY with esophageal dilation #54 Castellanos;  Surgeon: Roly Be MD;  Location: [facility] ENDOSCOPY;  Service: Gastroenterology   • ENDOSCOPY AND COLONOSCOPY N/A 12/29/2010    Hiatal hernia, chronic gastritis: biopsied, normal examined duodenum, normal examined ileum, mild diverticulosis " sigmoid colon-Dr. Roly Be, [facility]   • TOTAL ABDOMINAL HYSTERECTOMY Bilateral        Social History     Socioeconomic History   • Marital status:      Spouse name: Not on file   • Number of children: Not on file   • Years of education: Not on file   • Highest education level: Not on file   Tobacco Use   • Smoking status: Former Smoker     Quit date: 10/10/1994     Years since quittin.0   • Smokeless tobacco: Never Used   • Tobacco comment: CAFFEINE USE: 1 CUP DAILY.    Substance and Sexual Activity   • Alcohol use: Yes     Alcohol/week: 1.0 standard drinks     Types: 1 Glasses of wine per week     Comment: RARE   • Drug use: No   • Sexual activity: Defer       Family History   Problem Relation Age of Onset   • Hypertension Mother    • Heart disease Mother    • Arthritis Mother    • Colon cancer Mother         Colon   • Colon polyps Mother    • Hypertension Father    • Heart disease Father    • Prostate cancer Father    • COPD Sister    • Hypertension Brother    • Heart disease Brother          of heart attack (2 brothers)   • Tuberculosis Brother    • Diabetes Paternal Aunt        The following portion of the patient's history were reviewed and updated as appropriate: past medical history, past surgical history, past social history, past family history, allergies, current medications, and problem list.    Review of Systems   Constitution: Negative for diaphoresis, fever and malaise/fatigue.   HENT: Negative for congestion, hearing loss, hoarse voice, nosebleeds and sore throat.    Eyes: Negative for photophobia, vision loss in left eye, vision loss in right eye and visual disturbance.   Cardiovascular: Positive for palpitations. Negative for chest pain, dyspnea on exertion, irregular heartbeat, leg swelling, near-syncope, orthopnea, paroxysmal nocturnal dyspnea and syncope.   Respiratory: Negative for cough, hemoptysis, shortness of breath, sleep disturbances due to breathing, snoring, sputum  production and wheezing.    Endocrine: Negative for cold intolerance, heat intolerance, polydipsia, polyphagia and polyuria.   Hematologic/Lymphatic: Negative for bleeding problem. Does not bruise/bleed easily.   Skin: Negative for color change, dry skin, poor wound healing, rash and suspicious lesions.   Musculoskeletal: Negative for arthritis, back pain, falls, gout, joint pain, joint swelling, muscle cramps, muscle weakness and myalgias.   Gastrointestinal: Negative for bloating, abdominal pain, constipation, diarrhea, dysphagia, melena, nausea and vomiting.   Neurological: Negative for excessive daytime sleepiness, dizziness, headaches, light-headedness, loss of balance, numbness, paresthesias, seizures, vertigo and weakness.   Psychiatric/Behavioral: Negative for depression, memory loss and substance abuse. The patient is not nervous/anxious.        Allergies   Allergen Reactions   • Gatifloxacin Arrhythmia   • Rosuvastatin Myalgia   • Bactrim [Sulfamethoxazole-Trimethoprim] Headache         Current Outpatient Medications:   •  amitriptyline (ELAVIL) 25 MG tablet, Take 1 tablet by mouth Every Night., Disp: 90 tablet, Rfl: 1  •  atorvastatin (LIPITOR) 20 MG tablet, TAKE 1 TABLET BY MOUTH EVERY NIGHT, Disp: 90 tablet, Rfl: 1  •  esomeprazole (NexIUM) 20 MG capsule, Take 20 mg by mouth every morning before breakfast., Disp: , Rfl:   •  estradiol (ESTRACE) 0.5 MG tablet, TAKE 1 TABLET BY MOUTH EVERY DAY, Disp: 90 tablet, Rfl: 1  •  levothyroxine (SYNTHROID, LEVOTHROID) 75 MCG tablet, TAKE 1 TABLET BY MOUTH DAILY, Disp: 90 tablet, Rfl: 1  •  lisinopril (PRINIVIL,ZESTRIL) 20 MG tablet, TAKE 1 TABLET BY MOUTH EVERY DAY, Disp: 90 tablet, Rfl: 1  •  meloxicam (MOBIC) 7.5 MG tablet, Take 1 tablet by mouth Daily As Needed for Mild Pain . Take with food, Disp: 30 tablet, Rfl: 6  •  Multiple Vitamins-Minerals (MULTIVITAMIN WITH MINERALS) tablet tablet, Take 1 tablet by mouth Daily., Disp: , Rfl:   •  nitrofurantoin,  "macrocrystal-monohydrate, (MACROBID) 100 MG capsule, Take 1 po bid, Disp: 20 capsule, Rfl: 0  •  Probiotic Product (SOLUBLE FIBER/PROBIOTICS) chewable tablet, Chew 1 each Daily., Disp: , Rfl:   •  promethazine (PHENERGAN) 25 MG tablet, TAKE ONE TABLET BY MOUTH EVERY DAY AS NEEDED FOR NAUSEA OR VOMITING, Disp: 30 tablet, Rfl: 0        Objective:     Vitals:    10/28/20 1420   BP: 116/74   Pulse: 69   Resp: 16   SpO2: 98%   Weight: 73.9 kg (163 lb)   Height: 160 cm (63\")     Body mass index is 28.87 kg/m².      Vitals signs reviewed.   Constitutional:       General: Not in acute distress.     Appearance: Normal and healthy appearance. Well-developed, well-groomed, overweight and not in distress.   Eyes:      General:         Right eye: No discharge.         Left eye: No discharge.      Conjunctiva/sclera: Conjunctivae normal.   HENT:      Head: Normocephalic and atraumatic.      Right Ear: External ear normal.      Left Ear: External ear normal.      Nose: Nose normal.   Neck:      Musculoskeletal: Normal range of motion and neck supple.      Thyroid: No thyromegaly.      Vascular: No JVD.      Trachea: No tracheal deviation.      Lymphadenopathy: No cervical adenopathy.   Pulmonary:      Effort: Pulmonary effort is normal. No respiratory distress.      Breath sounds: Normal breath sounds. No wheezing. No rales.   Chest:      Chest wall: Not tender to palpatation.   Cardiovascular:      Normal rate. Regular rhythm.      Murmurs: There is a grade 1/6 systolic murmur.      No gallop.   Pulses:     Intact distal pulses.   Abdominal:      General: There is no distension.      Palpations: Abdomen is soft.      Tenderness: There is no abdominal tenderness.   Musculoskeletal: Normal range of motion.         General: No tenderness or deformity.   Skin:     General: Skin is warm and dry.      Findings: No erythema or rash.   Neurological:      Mental Status: Alert and oriented to person, place, and time.      Coordination: " Coordination normal.   Psychiatric:         Behavior: Behavior normal. Behavior is cooperative.         Thought Content: Thought content normal.         Judgment: Judgment normal.               ECG 12 Lead    Date/Time: 10/28/2020 2:30 PM  Performed by: Eve Degroot APRN  Authorized by: Eve Degroot APRN   Comparison: compared with previous ECG from 9/21/2020  Similar to previous ECG  Rhythm: sinus rhythm  Rate: normal  Conduction: conduction normal  ST Segments: ST segments normal  T Waves: T waves normal  QRS axis: normal    Clinical impression: normal ECG              Assessment:       Diagnosis Plan   1. Essential hypertension     2. Palpitations  Adult Transthoracic Echo Complete W/ Cont if Necessary Per Protocol   3. Hyperlipidemia, unspecified hyperlipidemia type     4. Carotid atherosclerosis, unspecified laterality     5. Heart murmur  Adult Transthoracic Echo Complete W/ Cont if Necessary Per Protocol          Plan:       1.  Carotid atherosclerosis- normal carotid ultrasound 10/18  2.  Essential HTN - well controlled  3.  Hyperlipidemia - continue lipid lowering therapy she is currently on atorvastatin 20 mg.  4.  PVCs - stable, intermittent  5.  Murmur - check echo    Check echo    RTO in 1 year with RM    As always, it has been a pleasure to participate in your patient's care. Thank you.       Sincerely,       ED Walker      Current Outpatient Medications:   •  amitriptyline (ELAVIL) 25 MG tablet, Take 1 tablet by mouth Every Night., Disp: 90 tablet, Rfl: 1  •  atorvastatin (LIPITOR) 20 MG tablet, TAKE 1 TABLET BY MOUTH EVERY NIGHT, Disp: 90 tablet, Rfl: 1  •  esomeprazole (NexIUM) 20 MG capsule, Take 20 mg by mouth every morning before breakfast., Disp: , Rfl:   •  estradiol (ESTRACE) 0.5 MG tablet, TAKE 1 TABLET BY MOUTH EVERY DAY, Disp: 90 tablet, Rfl: 1  •  levothyroxine (SYNTHROID, LEVOTHROID) 75 MCG tablet, TAKE 1 TABLET BY MOUTH DAILY, Disp: 90 tablet, Rfl: 1  •  lisinopril  (PRINIVIL,ZESTRIL) 20 MG tablet, TAKE 1 TABLET BY MOUTH EVERY DAY, Disp: 90 tablet, Rfl: 1  •  meloxicam (MOBIC) 7.5 MG tablet, Take 1 tablet by mouth Daily As Needed for Mild Pain . Take with food, Disp: 30 tablet, Rfl: 6  •  Multiple Vitamins-Minerals (MULTIVITAMIN WITH MINERALS) tablet tablet, Take 1 tablet by mouth Daily., Disp: , Rfl:   •  nitrofurantoin, macrocrystal-monohydrate, (MACROBID) 100 MG capsule, Take 1 po bid, Disp: 20 capsule, Rfl: 0  •  Probiotic Product (SOLUBLE FIBER/PROBIOTICS) chewable tablet, Chew 1 each Daily., Disp: , Rfl:   •  promethazine (PHENERGAN) 25 MG tablet, TAKE ONE TABLET BY MOUTH EVERY DAY AS NEEDED FOR NAUSEA OR VOMITING, Disp: 30 tablet, Rfl: 0  Dictated utilizing Dragon dictation

## 2020-10-28 ENCOUNTER — OFFICE VISIT (OUTPATIENT)
Dept: CARDIOLOGY | Facility: CLINIC | Age: 85
End: 2020-10-28

## 2020-10-28 VITALS
WEIGHT: 163 LBS | OXYGEN SATURATION: 98 % | SYSTOLIC BLOOD PRESSURE: 116 MMHG | HEART RATE: 69 BPM | RESPIRATION RATE: 16 BRPM | BODY MASS INDEX: 28.88 KG/M2 | HEIGHT: 63 IN | DIASTOLIC BLOOD PRESSURE: 74 MMHG

## 2020-10-28 DIAGNOSIS — R01.1 HEART MURMUR: ICD-10-CM

## 2020-10-28 DIAGNOSIS — I10 ESSENTIAL HYPERTENSION: Primary | ICD-10-CM

## 2020-10-28 DIAGNOSIS — R00.2 PALPITATIONS: ICD-10-CM

## 2020-10-28 DIAGNOSIS — I65.29 CAROTID ATHEROSCLEROSIS, UNSPECIFIED LATERALITY: ICD-10-CM

## 2020-10-28 DIAGNOSIS — E78.5 HYPERLIPIDEMIA, UNSPECIFIED HYPERLIPIDEMIA TYPE: ICD-10-CM

## 2020-10-28 PROCEDURE — 99214 OFFICE O/P EST MOD 30 MIN: CPT | Performed by: NURSE PRACTITIONER

## 2020-10-28 PROCEDURE — 93000 ELECTROCARDIOGRAM COMPLETE: CPT | Performed by: NURSE PRACTITIONER

## 2020-11-02 DIAGNOSIS — G47.00 INSOMNIA, UNSPECIFIED TYPE: ICD-10-CM

## 2020-11-02 RX ORDER — AMITRIPTYLINE HYDROCHLORIDE 25 MG/1
25 TABLET, FILM COATED ORAL NIGHTLY
Qty: 90 TABLET | Refills: 1 | Status: SHIPPED | OUTPATIENT
Start: 2020-11-02 | End: 2021-10-28

## 2020-11-03 ENCOUNTER — FLU SHOT (OUTPATIENT)
Dept: INTERNAL MEDICINE | Facility: CLINIC | Age: 85
End: 2020-11-03

## 2020-11-03 DIAGNOSIS — Z23 NEED FOR INFLUENZA VACCINATION: Primary | ICD-10-CM

## 2020-11-03 PROCEDURE — 90694 VACC AIIV4 NO PRSRV 0.5ML IM: CPT | Performed by: NURSE PRACTITIONER

## 2020-11-03 PROCEDURE — G0008 ADMIN INFLUENZA VIRUS VAC: HCPCS | Performed by: NURSE PRACTITIONER

## 2020-11-10 DIAGNOSIS — I10 ESSENTIAL HYPERTENSION: ICD-10-CM

## 2020-11-11 RX ORDER — ATORVASTATIN CALCIUM 20 MG/1
20 TABLET, FILM COATED ORAL NIGHTLY
Qty: 90 TABLET | Refills: 1 | Status: SHIPPED | OUTPATIENT
Start: 2020-11-11 | End: 2021-05-12

## 2020-11-11 RX ORDER — LISINOPRIL 20 MG/1
20 TABLET ORAL DAILY
Qty: 90 TABLET | Refills: 1 | Status: SHIPPED | OUTPATIENT
Start: 2020-11-11 | End: 2021-05-12

## 2020-11-25 DIAGNOSIS — E03.9 HYPOTHYROIDISM, UNSPECIFIED TYPE: ICD-10-CM

## 2020-11-30 RX ORDER — LEVOTHYROXINE SODIUM 0.07 MG/1
75 TABLET ORAL DAILY
Qty: 90 TABLET | Refills: 1 | Status: SHIPPED | OUTPATIENT
Start: 2020-11-30 | End: 2021-03-16

## 2020-12-01 ENCOUNTER — HOSPITAL ENCOUNTER (OUTPATIENT)
Dept: CARDIOLOGY | Facility: HOSPITAL | Age: 85
Discharge: HOME | End: 2020-12-01
Admitting: NURSE PRACTITIONER

## 2020-12-01 DIAGNOSIS — R01.1 HEART MURMUR: ICD-10-CM

## 2020-12-01 DIAGNOSIS — R00.2 PALPITATIONS: ICD-10-CM

## 2020-12-01 LAB
AORTIC DIMENSIONLESS INDEX: 0.9 (DI)
BH CV ECHO MEAS - ACS: 1.6 CM
BH CV ECHO MEAS - AO MAX PG: 7 MMHG
BH CV ECHO MEAS - AO MEAN PG (FULL): 1 MMHG
BH CV ECHO MEAS - AO MEAN PG: 3 MMHG
BH CV ECHO MEAS - AO ROOT AREA (BSA CORRECTED): 1.7
BH CV ECHO MEAS - AO ROOT AREA: 6.8 CM^2
BH CV ECHO MEAS - AO ROOT DIAM: 3 CM
BH CV ECHO MEAS - AO V2 MAX: 132 CM/SEC
BH CV ECHO MEAS - AO V2 MEAN: 78.6 CM/SEC
BH CV ECHO MEAS - AO V2 VTI: 24.5 CM
BH CV ECHO MEAS - AVA(I,A): 2.2 CM^2
BH CV ECHO MEAS - AVA(I,D): 2.2 CM^2
BH CV ECHO MEAS - BSA(HAYCOCK): 1.8 M^2
BH CV ECHO MEAS - BSA: 1.8 M^2
BH CV ECHO MEAS - BZI_BMI: 28.3 KILOGRAMS/M^2
BH CV ECHO MEAS - BZI_METRIC_HEIGHT: 160 CM
BH CV ECHO MEAS - BZI_METRIC_WEIGHT: 72.6 KG
BH CV ECHO MEAS - EDV(CUBED): 52.3 ML
BH CV ECHO MEAS - EDV(MOD-SP2): 46.6 ML
BH CV ECHO MEAS - EDV(MOD-SP4): 50.2 ML
BH CV ECHO MEAS - EDV(TEICH): 59.6 ML
BH CV ECHO MEAS - EF(CUBED): 66.8 %
BH CV ECHO MEAS - EF(MOD-BP): 66 %
BH CV ECHO MEAS - EF(MOD-SP2): 58.4 %
BH CV ECHO MEAS - EF(MOD-SP4): 68.7 %
BH CV ECHO MEAS - EF(TEICH): 59.1 %
BH CV ECHO MEAS - ESV(CUBED): 17.4 ML
BH CV ECHO MEAS - ESV(MOD-SP2): 19.4 ML
BH CV ECHO MEAS - ESV(MOD-SP4): 15.7 ML
BH CV ECHO MEAS - ESV(TEICH): 24.4 ML
BH CV ECHO MEAS - FS: 30.7 %
BH CV ECHO MEAS - IVS/LVPW: 1
BH CV ECHO MEAS - IVSD: 1 CM
BH CV ECHO MEAS - LAT PEAK E' VEL: 6.5 CM/SEC
BH CV ECHO MEAS - LV DIASTOLIC VOL/BSA (35-75): 28.5 ML/M^2
BH CV ECHO MEAS - LV MASS(C)D: 116.3 GRAMS
BH CV ECHO MEAS - LV MASS(C)DI: 66.1 GRAMS/M^2
BH CV ECHO MEAS - LV MAX PG: 5 MMHG
BH CV ECHO MEAS - LV MEAN PG: 2 MMHG
BH CV ECHO MEAS - LV SYSTOLIC VOL/BSA (12-30): 8.9 ML/M^2
BH CV ECHO MEAS - LV V1 MAX: 106 CM/SEC
BH CV ECHO MEAS - LV V1 MEAN: 56.7 CM/SEC
BH CV ECHO MEAS - LV V1 VTI: 20.8 CM
BH CV ECHO MEAS - LVIDD: 3.7 CM
BH CV ECHO MEAS - LVIDS: 2.6 CM
BH CV ECHO MEAS - LVLD AP2: 5.8 CM
BH CV ECHO MEAS - LVLD AP4: 6.6 CM
BH CV ECHO MEAS - LVLS AP2: 5 CM
BH CV ECHO MEAS - LVLS AP4: 5.2 CM
BH CV ECHO MEAS - LVOT AREA (M): 2.5 CM^2
BH CV ECHO MEAS - LVOT AREA: 2.5 CM^2
BH CV ECHO MEAS - LVOT DIAM: 1.8 CM
BH CV ECHO MEAS - LVPWD: 1 CM
BH CV ECHO MEAS - MED PEAK E' VEL: 7 CM/SEC
BH CV ECHO MEAS - MV A DUR: 0.13 SEC
BH CV ECHO MEAS - MV A MAX VEL: 80.5 CM/SEC
BH CV ECHO MEAS - MV DEC SLOPE: 364 CM/SEC^2
BH CV ECHO MEAS - MV DEC TIME: 0.16 SEC
BH CV ECHO MEAS - MV E MAX VEL: 58.7 CM/SEC
BH CV ECHO MEAS - MV E/A: 0.73
BH CV ECHO MEAS - MV MEAN PG: 2 MMHG
BH CV ECHO MEAS - MV P1/2T MAX VEL: 92.3 CM/SEC
BH CV ECHO MEAS - MV P1/2T: 74.3 MSEC
BH CV ECHO MEAS - MV V2 MEAN: 60.7 CM/SEC
BH CV ECHO MEAS - MV V2 VTI: 26 CM
BH CV ECHO MEAS - MVA P1/2T LCG: 2.4 CM^2
BH CV ECHO MEAS - MVA(P1/2T): 3 CM^2
BH CV ECHO MEAS - MVA(VTI): 2 CM^2
BH CV ECHO MEAS - PA ACC TIME: 0.09 SEC
BH CV ECHO MEAS - PA MAX PG: 1.8 MMHG
BH CV ECHO MEAS - PA PR(ACCEL): 40.8 MMHG
BH CV ECHO MEAS - PA V2 MAX: 66.5 CM/SEC
BH CV ECHO MEAS - PULM A REVS DUR: 0.13 SEC
BH CV ECHO MEAS - PULM A REVS VEL: 32 CM/SEC
BH CV ECHO MEAS - PULM DIAS VEL: 50.9 CM/SEC
BH CV ECHO MEAS - PULM S/D: 1.3
BH CV ECHO MEAS - PULM SYS VEL: 65.5 CM/SEC
BH CV ECHO MEAS - QP/QS: 0.66
BH CV ECHO MEAS - RAP SYSTOLE: 3 MMHG
BH CV ECHO MEAS - RV MEAN PG: 1 MMHG
BH CV ECHO MEAS - RV V1 MEAN: 37.4 CM/SEC
BH CV ECHO MEAS - RV V1 VTI: 12.3 CM
BH CV ECHO MEAS - RVOT AREA: 2.8 CM^2
BH CV ECHO MEAS - RVOT DIAM: 1.9 CM
BH CV ECHO MEAS - RVSP: 20 MMHG
BH CV ECHO MEAS - SI(AO): 95.2 ML/M^2
BH CV ECHO MEAS - SI(CUBED): 19.9 ML/M^2
BH CV ECHO MEAS - SI(LVOT): 30.1 ML/M^2
BH CV ECHO MEAS - SI(MOD-SP2): 15.5 ML/M^2
BH CV ECHO MEAS - SI(MOD-SP4): 19.6 ML/M^2
BH CV ECHO MEAS - SI(TEICH): 20.1 ML/M^2
BH CV ECHO MEAS - SV(AO): 167.5 ML
BH CV ECHO MEAS - SV(CUBED): 34.9 ML
BH CV ECHO MEAS - SV(LVOT): 52.9 ML
BH CV ECHO MEAS - SV(MOD-SP2): 27.2 ML
BH CV ECHO MEAS - SV(MOD-SP4): 34.5 ML
BH CV ECHO MEAS - SV(RVOT): 34.9 ML
BH CV ECHO MEAS - SV(TEICH): 35.3 ML
BH CV ECHO MEAS - TAPSE (>1.6): 2.1 CM
BH CV ECHO MEAS - TR MAX VEL: 208 CM/SEC
BH CV ECHO MEASUREMENTS AVERAGE E/E' RATIO: 8.7
BH CV XLRA - TDI S': 10.8 CM/SEC
LEFT ATRIUM VOLUME INDEX: 17.9 ML/M2
MAXIMAL PREDICTED HEART RATE: 137 BPM
STRESS TARGET HR: 116 BPM

## 2020-12-01 PROCEDURE — 93306 TTE W/DOPPLER COMPLETE: CPT | Performed by: INTERNAL MEDICINE

## 2020-12-01 PROCEDURE — 93306 TTE W/DOPPLER COMPLETE: CPT

## 2020-12-21 RX ORDER — ESTRADIOL 0.5 MG/1
TABLET ORAL
Qty: 90 TABLET | Refills: 0 | Status: SHIPPED | OUTPATIENT
Start: 2020-12-21 | End: 2021-03-30 | Stop reason: SDUPTHER

## 2021-03-16 DIAGNOSIS — E03.9 HYPOTHYROIDISM, UNSPECIFIED TYPE: ICD-10-CM

## 2021-03-16 RX ORDER — LEVOTHYROXINE SODIUM 0.07 MG/1
75 TABLET ORAL DAILY
Qty: 90 TABLET | Refills: 1 | Status: SHIPPED | OUTPATIENT
Start: 2021-03-16 | End: 2022-02-02

## 2021-03-22 ENCOUNTER — TELEPHONE (OUTPATIENT)
Dept: INTERNAL MEDICINE | Facility: CLINIC | Age: 86
End: 2021-03-22

## 2021-03-22 DIAGNOSIS — E03.9 HYPOTHYROIDISM, UNSPECIFIED TYPE: Primary | ICD-10-CM

## 2021-03-22 DIAGNOSIS — E78.5 HYPERLIPIDEMIA, UNSPECIFIED HYPERLIPIDEMIA TYPE: ICD-10-CM

## 2021-03-22 DIAGNOSIS — R71.8 ELEVATED HEMATOCRIT: ICD-10-CM

## 2021-03-22 DIAGNOSIS — I10 ESSENTIAL HYPERTENSION: ICD-10-CM

## 2021-03-22 NOTE — TELEPHONE ENCOUNTER
----- Message from ED Mcgee sent at 3/22/2021  3:20 PM EDT -----  CBC, CMP, LP, TSH  ----- Message -----  From: Ana Lilia Arthur  Sent: 3/22/2021   2:22 PM EDT  To: ED Mcgee    Please advise for labs

## 2021-03-24 LAB
ALBUMIN SERPL-MCNC: 4.5 G/DL (ref 3.5–5.2)
ALBUMIN/GLOB SERPL: 2 G/DL
ALP SERPL-CCNC: 82 U/L (ref 39–117)
ALT SERPL-CCNC: 20 U/L (ref 1–33)
AST SERPL-CCNC: 26 U/L (ref 1–32)
BASOPHILS # BLD AUTO: 0.06 10*3/MM3 (ref 0–0.2)
BASOPHILS NFR BLD AUTO: 0.7 % (ref 0–1.5)
BILIRUB SERPL-MCNC: 0.7 MG/DL (ref 0–1.2)
BUN SERPL-MCNC: 17 MG/DL (ref 8–23)
BUN/CREAT SERPL: 20.2 (ref 7–25)
CALCIUM SERPL-MCNC: 9.5 MG/DL (ref 8.6–10.5)
CHLORIDE SERPL-SCNC: 97 MMOL/L (ref 98–107)
CHOLEST SERPL-MCNC: 190 MG/DL (ref 0–200)
CHOLEST/HDLC SERPL: 2.04 {RATIO}
CO2 SERPL-SCNC: 23.3 MMOL/L (ref 22–29)
CREAT SERPL-MCNC: 0.84 MG/DL (ref 0.57–1)
EOSINOPHIL # BLD AUTO: 0.28 10*3/MM3 (ref 0–0.4)
EOSINOPHIL NFR BLD AUTO: 3.3 % (ref 0.3–6.2)
ERYTHROCYTE [DISTWIDTH] IN BLOOD BY AUTOMATED COUNT: 12.5 % (ref 12.3–15.4)
GLOBULIN SER CALC-MCNC: 2.3 GM/DL
GLUCOSE SERPL-MCNC: 80 MG/DL (ref 65–99)
HCT VFR BLD AUTO: 49.7 % (ref 34–46.6)
HDLC SERPL-MCNC: 93 MG/DL (ref 40–60)
HGB BLD-MCNC: 16.3 G/DL (ref 12–15.9)
IMM GRANULOCYTES # BLD AUTO: 0.03 10*3/MM3 (ref 0–0.05)
IMM GRANULOCYTES NFR BLD AUTO: 0.4 % (ref 0–0.5)
LDLC SERPL CALC-MCNC: 84 MG/DL (ref 0–100)
LYMPHOCYTES # BLD AUTO: 3.35 10*3/MM3 (ref 0.7–3.1)
LYMPHOCYTES NFR BLD AUTO: 39.6 % (ref 19.6–45.3)
MCH RBC QN AUTO: 30 PG (ref 26.6–33)
MCHC RBC AUTO-ENTMCNC: 32.8 G/DL (ref 31.5–35.7)
MCV RBC AUTO: 91.4 FL (ref 79–97)
MONOCYTES # BLD AUTO: 0.56 10*3/MM3 (ref 0.1–0.9)
MONOCYTES NFR BLD AUTO: 6.6 % (ref 5–12)
NEUTROPHILS # BLD AUTO: 4.17 10*3/MM3 (ref 1.7–7)
NEUTROPHILS NFR BLD AUTO: 49.4 % (ref 42.7–76)
NRBC BLD AUTO-RTO: 0 /100 WBC (ref 0–0.2)
PLATELET # BLD AUTO: 238 10*3/MM3 (ref 140–450)
POTASSIUM SERPL-SCNC: 4.4 MMOL/L (ref 3.5–5.2)
PROT SERPL-MCNC: 6.8 G/DL (ref 6–8.5)
RBC # BLD AUTO: 5.44 10*6/MM3 (ref 3.77–5.28)
SODIUM SERPL-SCNC: 134 MMOL/L (ref 136–145)
TRIGL SERPL-MCNC: 71 MG/DL (ref 0–150)
TSH SERPL DL<=0.005 MIU/L-ACNC: 1.69 UIU/ML (ref 0.27–4.2)
VLDLC SERPL CALC-MCNC: 13 MG/DL (ref 5–40)
WBC # BLD AUTO: 8.45 10*3/MM3 (ref 3.4–10.8)

## 2021-03-30 ENCOUNTER — OFFICE VISIT (OUTPATIENT)
Dept: INTERNAL MEDICINE | Facility: CLINIC | Age: 86
End: 2021-03-30

## 2021-03-30 VITALS
WEIGHT: 166.2 LBS | OXYGEN SATURATION: 96 % | DIASTOLIC BLOOD PRESSURE: 74 MMHG | BODY MASS INDEX: 29.45 KG/M2 | SYSTOLIC BLOOD PRESSURE: 132 MMHG | TEMPERATURE: 96.8 F | HEIGHT: 63 IN | HEART RATE: 68 BPM

## 2021-03-30 DIAGNOSIS — R09.89 LEFT CAROTID BRUIT: ICD-10-CM

## 2021-03-30 DIAGNOSIS — R11.0 NAUSEA: ICD-10-CM

## 2021-03-30 DIAGNOSIS — M47.812 OSTEOARTHRITIS OF CERVICAL SPINE, UNSPECIFIED SPINAL OSTEOARTHRITIS COMPLICATION STATUS: ICD-10-CM

## 2021-03-30 DIAGNOSIS — R71.8 ELEVATED HEMATOCRIT: ICD-10-CM

## 2021-03-30 DIAGNOSIS — Z00.00 HEALTH CARE MAINTENANCE: Primary | ICD-10-CM

## 2021-03-30 DIAGNOSIS — N95.1 MENOPAUSAL SYNDROME: ICD-10-CM

## 2021-03-30 PROBLEM — H91.90 HEARING LOSS: Status: ACTIVE | Noted: 2021-03-30

## 2021-03-30 PROCEDURE — 99214 OFFICE O/P EST MOD 30 MIN: CPT | Performed by: NURSE PRACTITIONER

## 2021-03-30 PROCEDURE — G0439 PPPS, SUBSEQ VISIT: HCPCS | Performed by: NURSE PRACTITIONER

## 2021-03-30 RX ORDER — MELOXICAM 7.5 MG/1
7.5 TABLET ORAL DAILY PRN
Qty: 30 TABLET | Refills: 6 | Status: SHIPPED | OUTPATIENT
Start: 2021-03-30 | End: 2023-03-07

## 2021-03-30 RX ORDER — ESTRADIOL 0.1 MG/G
CREAM VAGINAL
COMMUNITY
Start: 2021-02-14 | End: 2022-05-05

## 2021-03-30 RX ORDER — PROMETHAZINE HYDROCHLORIDE 25 MG/1
25 TABLET ORAL DAILY PRN
Qty: 30 TABLET | Refills: 3 | Status: SHIPPED | OUTPATIENT
Start: 2021-03-30 | End: 2022-05-05

## 2021-03-30 RX ORDER — ESTRADIOL 0.5 MG/1
0.5 TABLET ORAL DAILY
Qty: 90 TABLET | Refills: 2 | Status: SHIPPED | OUTPATIENT
Start: 2021-03-30 | End: 2021-11-08

## 2021-03-30 RX ORDER — ASPIRIN 81 MG/1
81 TABLET ORAL DAILY
COMMUNITY
End: 2021-11-24 | Stop reason: SDDI

## 2021-03-30 NOTE — PATIENT INSTRUCTIONS
Medicare Wellness  Personal Prevention Plan of Service     Date of Office Visit:  2021  Encounter Provider:  ED Morris  Place of Service:  [facility]  Patient Name: Isabel Handy  :  1935    As part of the Medicare Wellness portion of your visit today, we are providing you with this personalized preventive plan of services (PPPS). This plan is based upon recommendations of the [place] Preventive Services Task Force (USPSTF) and the Advisory Committee on Immunization Practices (ACIP).    This lists the preventive care services that should be considered, and provides dates of when you are due. Items listed as completed are up-to-date and do not require any further intervention.    Health Maintenance   Topic Date Due   • TDAP/TD VACCINES (1 - Tdap) Never done   • ZOSTER VACCINE (1 of 2) Never done   • ANNUAL WELLNESS VISIT  2020   • MAMMOGRAM  2021   • LIPID PANEL  2022   • COLONOSCOPY  2023   • DXA SCAN  10/11/2023   • COVID-19 Vaccine  Completed   • INFLUENZA VACCINE  Completed   • Pneumococcal Vaccine 65+  Completed   • MENINGOCOCCAL VACCINE  Aged Out       No orders of the defined types were placed in this encounter.      No follow-ups on file.

## 2021-03-30 NOTE — PROGRESS NOTES
Subjective   Isabel Handy is a 85 y.o. female.     History of Present Illness   The patient is here today for CPE and lab work F/U. She is feeling well.     Frequent UTIs- estrace vaginal cream is helping prevent UTIs  Knot in left palm of her hand. No pain or limited ROM. Started in the last 6 months.   Nausea- occ, takes phenergan with relief.   The following portions of the patient's history were reviewed and updated as appropriate: allergies, current medications, past family history, past medical history, past social history, past surgical history and problem list.    Review of Systems   Constitutional: Negative for chills, fatigue and fever.   HENT: Positive for hearing loss. Negative for ear pain, rhinorrhea and sore throat.    Eyes: Negative.    Respiratory: Negative for cough and shortness of breath.    Cardiovascular: Negative.    Gastrointestinal: Positive for nausea.   Endocrine: Negative for cold intolerance and heat intolerance.   Genitourinary: Negative for breast discharge, breast lump, breast pain, difficulty urinating, dyspareunia, dysuria, flank pain, frequency, genital sores, hematuria, pelvic pain and urgency.   Musculoskeletal: Positive for arthralgias (occ).   Skin: Negative.    Allergic/Immunologic: Negative for environmental allergies and food allergies.   Neurological: Negative.    Hematological: Bruises/bleeds easily.   Psychiatric/Behavioral: Negative for dysphoric mood and suicidal ideas. The patient is not nervous/anxious.        Objective   Physical Exam  Constitutional:       Appearance: Normal appearance. She is well-developed.      Comments: Body mass index is 29.44 kg/m².     HENT:      Right Ear: Hearing, tympanic membrane, ear canal and external ear normal.      Left Ear: Tympanic membrane, ear canal and external ear normal.   Eyes:      General: Lids are normal.      Conjunctiva/sclera: Conjunctivae normal.      Pupils: Pupils are equal, round, and reactive to light.   Neck:       Thyroid: No thyroid mass or thyromegaly.      Vascular: No carotid bruit.      Trachea: Trachea normal.   Cardiovascular:      Rate and Rhythm: Normal rate and regular rhythm.      Pulses: Normal pulses.           Carotid pulses are on the left side with bruit.     Heart sounds: Normal heart sounds.   Pulmonary:      Effort: Pulmonary effort is normal.      Breath sounds: Normal breath sounds.   Abdominal:      General: Bowel sounds are normal.      Palpations: Abdomen is soft.      Tenderness: There is no abdominal tenderness.   Musculoskeletal:         General: Normal range of motion.      Cervical back: Normal range of motion.      Right lower leg: Edema (mild) present.      Left lower leg: Edema (mild) present.   Lymphadenopathy:      Cervical: No cervical adenopathy.      Upper Body:      Right upper body: No supraclavicular adenopathy.      Left upper body: No supraclavicular adenopathy.      Lower Body: No right inguinal adenopathy. No left inguinal adenopathy.   Skin:     General: Skin is warm and dry.   Neurological:      Mental Status: She is alert and oriented to person, place, and time.      GCS: GCS eye subscore is 4. GCS verbal subscore is 5. GCS motor subscore is 6.      Cranial Nerves: No cranial nerve deficit.      Sensory: No sensory deficit.      Deep Tendon Reflexes:      Reflex Scores:       Patellar reflexes are 0 on the right side and 0 on the left side.  Psychiatric:         Speech: Speech normal.         Behavior: Behavior normal. Behavior is cooperative.         Thought Content: Thought content normal.         Judgment: Judgment normal.         Vitals:    03/30/21 1056   BP: 132/74   Pulse: 68   Temp: 96.8 °F (36 °C)   SpO2: 96%     Body mass index is 29.44 kg/m².      Assessment/Plan   Diagnoses and all orders for this visit:    1. Health care maintenance (Primary)    2. Osteoarthritis of cervical spine, unspecified spinal osteoarthritis complication status  -     meloxicam (MOBIC)  7.5 MG tablet; Take 1 tablet by mouth Daily As Needed for Mild Pain . Take with food  Dispense: 30 tablet; Refill: 6    3. Nausea  -     promethazine (PHENERGAN) 25 MG tablet; Take 1 tablet by mouth Daily As Needed for Nausea or Vomiting.  Dispense: 30 tablet; Refill: 3    4. Menopausal syndrome    5. Left carotid bruit    6. Elevated hematocrit    Other orders  -     estradiol (ESTRACE) 0.5 MG tablet; Take 1 tablet by mouth Daily.  Dispense: 90 tablet; Refill: 2                 1. Preventative counseling- work on exercise and nutrition   2. Dupuytren contracture- pt will notify when ready for hand referral   3. HRT- pt feels benefits outweigh risks, she opts to continue estrace tab  4. Nausea- aware of risk of falls with phenergan and opts to continue  5. Left carotid bruit- check US  6. Elevated hemoglobin- suggest giving blood, will continue to monitor, this is the baseline for patient

## 2021-03-30 NOTE — PROGRESS NOTES
The ABCs of the Annual Wellness Visit  Subsequent Medicare Wellness Visit    Chief Complaint   Patient presents with   • Medicare Wellness-subsequent   • Hyperlipidemia   • Hypertension   • Cyst     pt c/o knot on left palm.       Subjective   History of Present Illness:  Isabel Handy is a 85 y.o. female who presents for a Subsequent Medicare Wellness Visit.    HEALTH RISK ASSESSMENT    Recent Hospitalizations:  No hospitalization(s) within the last year.    Current Medical Providers:  Patient Care Team:  Beth Figueredo APRN as PCP - General (Family Medicine)  Beth Figueredo APRN as PCP - Internal Medicine (Internal Medicine)    Smoking Status:  Social History     Tobacco Use   Smoking Status Former Smoker   • Quit date: 10/10/1994   • Years since quittin.4   Smokeless Tobacco Never Used   Tobacco Comment    CAFFEINE USE: 1 CUP DAILY.        Alcohol Consumption:  Social History     Substance and Sexual Activity   Alcohol Use Yes   • Alcohol/week: 1.0 standard drinks   • Types: 1 Glasses of wine per week    Comment: RARE       Depression Screen:   PHQ-2/PHQ-9 Depression Screening 3/30/2021   Little interest or pleasure in doing things 0   Feeling down, depressed, or hopeless 0   Trouble falling or staying asleep, or sleeping too much 0   Feeling tired or having little energy 0   Poor appetite or overeating 1   Feeling bad about yourself - or that you are a failure or have let yourself or your family down 0   Trouble concentrating on things, such as reading the newspaper or watching television 0   Moving or speaking so slowly that other people could have noticed. Or the opposite - being so fidgety or restless that you have been moving around a lot more than usual 0   Thoughts that you would be better off dead, or of hurting yourself in some way 0   Total Score 1   If you checked off any problems, how difficult have these problems made it for you to do your work, take care of things at home, or get along with  other people? Not difficult at all       Fall Risk Screen:  QUETA Fall Risk Assessment was completed, and patient is at LOW risk for falls.Assessment completed on:3/30/2021    Health Habits and Functional and Cognitive Screening:  Functional & Cognitive Status 3/30/2021   Do you have difficulty preparing food and eating? No   Do you have difficulty bathing yourself, getting dressed or grooming yourself? No   Do you have difficulty using the toilet? No   Do you have difficulty moving around from place to place? No   Do you have trouble with steps or getting out of a bed or a chair? No   Current Diet Well Balanced Diet   Dental Exam Up to date   Eye Exam Up to date   Exercise (times per week) 7 times per week   Current Exercises Include Walking   Current Exercise Activities Include -   Do you need help using the phone?  No   Are you deaf or do you have serious difficulty hearing?  Yes   Do you need help with transportation? No   Do you need help shopping? No   Do you need help preparing meals?  No   Do you need help with housework?  No   Do you need help with laundry? No   Do you need help taking your medications? No   Do you need help managing money? No   Do you ever drive or ride in a car without wearing a seat belt? No   Have you felt unusual stress, anger or loneliness in the last month? No   Who do you live with? Alone   If you need help, do you have trouble finding someone available to you? No   Have you been bothered in the last four weeks by sexual problems? No   Do you have difficulty concentrating, remembering or making decisions? No         Does the patient have evidence of cognitive impairment? No    Asprin use counseling:Does not need ASA (and currently is not on it)    Age-appropriate Screening Schedule:  Refer to the list below for future screening recommendations based on patient's age, sex and/or medical conditions. Orders for these recommended tests are listed in the plan section. The patient has  been provided with a written plan.    Health Maintenance   Topic Date Due   • TDAP/TD VACCINES (1 - Tdap) Never done   • ZOSTER VACCINE (1 of 2) Never done   • MAMMOGRAM  02/18/2021   • LIPID PANEL  03/23/2022   • COLONOSCOPY  09/21/2023   • DXA SCAN  10/11/2023   • INFLUENZA VACCINE  Completed          The following portions of the patient's history were reviewed and updated as appropriate: allergies, current medications, past family history, past medical history, past social history, past surgical history and problem list.    Outpatient Medications Prior to Visit   Medication Sig Dispense Refill   • amitriptyline (ELAVIL) 25 MG tablet Take 1 tablet by mouth Every Night. 90 tablet 1   • atorvastatin (LIPITOR) 20 MG tablet Take 1 tablet by mouth Every Night. 90 tablet 1   • esomeprazole (NexIUM) 20 MG capsule Take 20 mg by mouth every morning before breakfast.     • estradiol (ESTRACE) 0.1 MG/GM vaginal cream APPLY 1/4 INICH ON FINGERTIP AND APPLY AS DIRECTED EVERY DAY FOR 30 DAYS THEN EVERY OTHER DAY     • levothyroxine (SYNTHROID, LEVOTHROID) 75 MCG tablet TAKE 1 TABLET BY MOUTH DAILY 90 tablet 1   • lisinopril (PRINIVIL,ZESTRIL) 20 MG tablet Take 1 tablet by mouth Daily. 90 tablet 1   • Menthol-Zinc Oxide (CALMOSEPTINE EX) Apply  topically.     • Multiple Vitamins-Minerals (MULTIVITAMIN WITH MINERALS) tablet tablet Take 1 tablet by mouth Daily.     • Probiotic Product (SOLUBLE FIBER/PROBIOTICS) chewable tablet Chew 1 each Daily.     • estradiol (ESTRACE) 0.5 MG tablet TAKE 1 TABLET BY MOUTH EVERY DAY 90 tablet 0   • meloxicam (MOBIC) 7.5 MG tablet Take 1 tablet by mouth Daily As Needed for Mild Pain . Take with food 30 tablet 6   • phenazopyridine (PYRIDIUM) 200 MG tablet Take 1 tablet by mouth 3 (Three) Times a Day As Needed for Bladder Spasms. 9 tablet 0   • promethazine (PHENERGAN) 25 MG tablet TAKE ONE TABLET BY MOUTH EVERY DAY AS NEEDED FOR NAUSEA OR VOMITING 30 tablet 0   • aspirin 81 MG EC tablet Take 81  "mg by mouth Daily.     • amoxicillin-clavulanate (AUGMENTIN) 875-125 MG per tablet Take 1 tablet by mouth 2 (Two) Times a Day. 20 tablet 0     No facility-administered medications prior to visit.       Patient Active Problem List   Diagnosis   • Gastroesophageal reflux disease   • Menopausal syndrome   • Osteoarthritis of cervical spine   • Carotid atherosclerosis   • Prediabetes   • Low back pain   • Insomnia   • Hypothyroidism   • Essential hypertension   • Hyperlipidemia   • Allergic rhinitis   • Palpitations   • Anxiety   • PVC's (premature ventricular contractions)   • Vitamin D deficiency   • Lung nodule   • Hyperglycemia   • Occipital pain   • Malaise and fatigue   • Acute nonintractable headache   • Elevated hematocrit   • Posterior subcapsular age-related cataract of both eyes   • Dry eye syndrome of bilateral lacrimal glands   • Hearing loss   • Nausea       Advanced Care Planning:  ACP discussion was held with the patient during this visit. Patient does not have an advance directive, declines further assistance.    Review of Systems    Compared to one year ago, the patient feels her physical health is the same.  Compared to one year ago, the patient feels her mental health is the same.    Reviewed chart for potential of high risk medication in the elderly: yes  Reviewed chart for potential of harmful drug interactions in the elderly:yes    Objective         Vitals:    03/30/21 1056   BP: 132/74   BP Location: Left arm   Patient Position: Sitting   Cuff Size: Large Adult   Pulse: 68   Temp: 96.8 °F (36 °C)   SpO2: 96%   Weight: 75.4 kg (166 lb 3.2 oz)   Height: 160 cm (63\")   PainSc:   2       Body mass index is 29.44 kg/m².  Discussed the patient's BMI with her. The BMI is above average; no BMI management plan is appropriate..    Physical Exam    Lab Results   Component Value Date    GLU 80 03/23/2021    CHLPL 190 03/23/2021    TRIG 71 03/23/2021    HDL 93 (H) 03/23/2021    LDL 84 03/23/2021    VLDL 13 " 03/23/2021        Assessment/Plan   Medicare Risks and Personalized Health Plan  CMS Preventative Services Quick Reference  Advance Directive Discussion  Breast Cancer/Mammogram Screening  Depression/Dysphoria  Diabetic Lab Screening   Fall Risk  Glaucoma Risk  Immunizations Discussed/Encouraged (specific immunizations; Td and Shingrix )  Obesity/Overweight   Osteoprorosis Risk    The above risks/problems have been discussed with the patient.  Pertinent information has been shared with the patient in the After Visit Summary.  Follow up plans and orders are seen below in the Assessment/Plan Section.    Diagnoses and all orders for this visit:    1. Health care maintenance (Primary)    2. Osteoarthritis of cervical spine, unspecified spinal osteoarthritis complication status  -     meloxicam (MOBIC) 7.5 MG tablet; Take 1 tablet by mouth Daily As Needed for Mild Pain . Take with food  Dispense: 30 tablet; Refill: 6    3. Nausea  -     promethazine (PHENERGAN) 25 MG tablet; Take 1 tablet by mouth Daily As Needed for Nausea or Vomiting.  Dispense: 30 tablet; Refill: 3    4. Menopausal syndrome    5. Left carotid bruit    6. Elevated hematocrit    Other orders  -     estradiol (ESTRACE) 0.5 MG tablet; Take 1 tablet by mouth Daily.  Dispense: 90 tablet; Refill: 2      Follow Up:  No follow-ups on file.   Pt defers DEXA to next year  An After Visit Summary and PPPS were given to the patient.

## 2021-04-06 ENCOUNTER — TRANSCRIBE ORDERS (OUTPATIENT)
Dept: INTERNAL MEDICINE | Facility: CLINIC | Age: 86
End: 2021-04-06

## 2021-04-06 DIAGNOSIS — Z13.6 ENCOUNTER FOR SCREENING FOR VASCULAR DISEASE: Primary | ICD-10-CM

## 2021-04-13 ENCOUNTER — APPOINTMENT (OUTPATIENT)
Dept: WOMENS IMAGING | Facility: HOSPITAL | Age: 86
End: 2021-04-13

## 2021-04-13 PROCEDURE — 77063 BREAST TOMOSYNTHESIS BI: CPT | Performed by: RADIOLOGY

## 2021-04-13 PROCEDURE — 77067 SCR MAMMO BI INCL CAD: CPT | Performed by: RADIOLOGY

## 2021-05-12 DIAGNOSIS — I10 ESSENTIAL HYPERTENSION: ICD-10-CM

## 2021-05-12 RX ORDER — ATORVASTATIN CALCIUM 20 MG/1
20 TABLET, FILM COATED ORAL NIGHTLY
Qty: 90 TABLET | Refills: 1 | Status: SHIPPED | OUTPATIENT
Start: 2021-05-12 | End: 2021-11-15

## 2021-05-12 RX ORDER — LISINOPRIL 20 MG/1
20 TABLET ORAL DAILY
Qty: 90 TABLET | Refills: 1 | Status: SHIPPED | OUTPATIENT
Start: 2021-05-12 | End: 2021-11-15

## 2021-05-24 ENCOUNTER — HOSPITAL ENCOUNTER (OUTPATIENT)
Dept: CARDIOLOGY | Facility: HOSPITAL | Age: 86
Discharge: HOME | End: 2021-05-24
Admitting: NURSE PRACTITIONER

## 2021-05-24 VITALS
DIASTOLIC BLOOD PRESSURE: 69 MMHG | HEART RATE: 73 BPM | WEIGHT: 163 LBS | HEIGHT: 61 IN | SYSTOLIC BLOOD PRESSURE: 146 MMHG | BODY MASS INDEX: 30.78 KG/M2

## 2021-05-24 DIAGNOSIS — Z13.6 ENCOUNTER FOR SCREENING FOR VASCULAR DISEASE: ICD-10-CM

## 2021-05-24 LAB
BH CV VAS BP LEFT ARM: NORMAL MMHG
BH CV VAS BP RIGHT ARM: NORMAL MMHG
BH CV XLRA MEAS LEFT ICA/CCA RATIO: 1
BH CV XLRA MEAS LEFT MID CCA PSV: NORMAL CM/SEC
BH CV XLRA MEAS LEFT MID ICA PSV: NORMAL CM/SEC
BH CV XLRA MEAS LEFT PROX ECA PSV: NORMAL CM/SEC
BH CV XLRA MEAS RIGHT ICA/CCA RATIO: 0.86
BH CV XLRA MEAS RIGHT MID CCA PSV: NORMAL CM/SEC
BH CV XLRA MEAS RIGHT MID ICA PSV: NORMAL CM/SEC
BH CV XLRA MEAS RIGHT PROX ECA PSV: NORMAL CM/SEC
MAXIMAL PREDICTED HEART RATE: 137 BPM
STRESS TARGET HR: 116 BPM

## 2021-05-24 PROCEDURE — 93799 UNLISTED CV SVC/PROCEDURE: CPT

## 2021-05-25 ENCOUNTER — TELEPHONE (OUTPATIENT)
Dept: CARDIOLOGY | Facility: CLINIC | Age: 86
End: 2021-05-25

## 2021-05-25 NOTE — TELEPHONE ENCOUNTER
Notified patient of results. She verbalized understanding.    Regine Doe, RN  [place] [facility] [place]

## 2021-10-04 DIAGNOSIS — E03.9 HYPOTHYROIDISM, UNSPECIFIED TYPE: ICD-10-CM

## 2021-10-04 DIAGNOSIS — E78.5 HYPERLIPIDEMIA, UNSPECIFIED HYPERLIPIDEMIA TYPE: ICD-10-CM

## 2021-10-04 DIAGNOSIS — I10 ESSENTIAL HYPERTENSION: Primary | ICD-10-CM

## 2021-10-27 ENCOUNTER — OFFICE VISIT (OUTPATIENT)
Dept: INTERNAL MEDICINE | Facility: CLINIC | Age: 86
End: 2021-10-27

## 2021-10-27 VITALS
HEIGHT: 61 IN | WEIGHT: 168.8 LBS | BODY MASS INDEX: 31.87 KG/M2 | HEART RATE: 69 BPM | TEMPERATURE: 97.3 F | DIASTOLIC BLOOD PRESSURE: 80 MMHG | SYSTOLIC BLOOD PRESSURE: 158 MMHG | OXYGEN SATURATION: 97 %

## 2021-10-27 DIAGNOSIS — E03.9 HYPOTHYROIDISM, UNSPECIFIED TYPE: ICD-10-CM

## 2021-10-27 DIAGNOSIS — I10 ESSENTIAL HYPERTENSION: ICD-10-CM

## 2021-10-27 DIAGNOSIS — N95.1 MENOPAUSAL SYNDROME: ICD-10-CM

## 2021-10-27 DIAGNOSIS — E78.5 HYPERLIPIDEMIA, UNSPECIFIED HYPERLIPIDEMIA TYPE: Primary | ICD-10-CM

## 2021-10-27 PROCEDURE — 99214 OFFICE O/P EST MOD 30 MIN: CPT | Performed by: NURSE PRACTITIONER

## 2021-10-27 NOTE — PROGRESS NOTES
Subjective   Isabel Handy is a 85 y.o. female.      History of Present Illness   The patient is here today to F/U on lab work.  She is feeling well. Her BP has been up recently. She had a billing issue with her medicare wellness that has really been a problem.     Nausea- occ, takes phenergan with relief.   The following portions of the patient's history were reviewed and updated as appropriate: allergies, current medications, past family history, past medical history, past social history, past surgical history and problem list.    Review of Systems   Constitutional: Negative.    Respiratory: Negative.    Cardiovascular: Positive for leg swelling. Negative for chest pain and palpitations.   Neurological: Positive for headache.   Psychiatric/Behavioral: Negative for depressed mood. The patient is nervous/anxious.        Objective   Physical Exam  Constitutional:       Appearance: Normal appearance. She is well-developed.   Neck:      Thyroid: No thyromegaly.   Cardiovascular:      Rate and Rhythm: Normal rate and regular rhythm.      Heart sounds: Normal heart sounds.      Comments: mild  Pulmonary:      Effort: Pulmonary effort is normal.      Breath sounds: Normal breath sounds.   Musculoskeletal:      Cervical back: Normal range of motion and neck supple.      Right lower leg: Edema present.      Left lower leg: Edema present.   Lymphadenopathy:      Cervical: No cervical adenopathy.   Skin:     General: Skin is warm and dry.   Neurological:      Mental Status: She is alert.   Psychiatric:         Mood and Affect: Mood is anxious. Affect is tearful.         Behavior: Behavior normal.         Thought Content: Thought content normal.         Judgment: Judgment normal.         Vitals:    10/27/21 0929   BP: 158/80   Pulse: 69   Temp: 97.3 °F (36.3 °C)   SpO2: 97%     Body mass index is 31.91 kg/m².      Assessment/Plan   Diagnoses and all orders for this visit:    1. Hyperlipidemia, unspecified hyperlipidemia  type (Primary)    2. Essential hypertension    3. Menopausal syndrome    4. Hypothyroidism, unspecified type               1. HPL- check lab  2. HTN- monitor at home and call if syst >140 or diast>90, bring auto cuff for check here, if needed will increase lisinopril  OK to take additional 1/2 tab today  3. HRT- pt feels benefits outweigh risks, she opts to continue estrace tab  Nausea- aware of risk of falls with phenergan and opts to continue  4. Hypothyroidism- check lab  Fasting labs today and will call results.  “Discussed risks/benefits to vaccination, reviewed components of the vaccine, discussed VIS, discussed informed consent, informed consent obtained. Patient/Parent was allowed to accept or refuse vaccine. Questions answered to satisfactory state of patient/Parent. We reviewed typical age appropriate and seasonally appropriate vaccinations. Reviewed immunization history and updated state vaccination form as needed. Patient was counseled on Influenza

## 2021-10-27 NOTE — PATIENT INSTRUCTIONS
1. HPL- check lab  2. HTN- monitor at home and call if syst >140 or diast>90, bring auto cuff for check here, if needed will increase lisinopril  OK to take additional 1/2 tab today  3. HRT- pt feels benefits outweigh risks, she opts to continue estrace tab  Nausea- aware of risk of falls with phenergan and opts to continue  4. Hypothyroidism- check lab  Fasting labs today and will call results.

## 2021-10-28 DIAGNOSIS — G47.00 INSOMNIA, UNSPECIFIED TYPE: ICD-10-CM

## 2021-10-28 RX ORDER — AMITRIPTYLINE HYDROCHLORIDE 25 MG/1
25 TABLET, FILM COATED ORAL NIGHTLY
Qty: 90 TABLET | Refills: 1 | Status: SHIPPED | OUTPATIENT
Start: 2021-10-28 | End: 2023-03-07 | Stop reason: SDUPTHER

## 2021-10-29 LAB
ALBUMIN SERPL-MCNC: 4.7 G/DL (ref 3.6–4.6)
ALBUMIN/GLOB SERPL: 1.9 {RATIO} (ref 1.2–2.2)
ALP SERPL-CCNC: 96 IU/L (ref 44–121)
ALT SERPL-CCNC: 15 IU/L (ref 0–32)
AST SERPL-CCNC: 24 IU/L (ref 0–40)
BASOPHILS # BLD AUTO: 0.1 X10E3/UL (ref 0–0.2)
BASOPHILS NFR BLD AUTO: 1 %
BILIRUB SERPL-MCNC: 0.8 MG/DL (ref 0–1.2)
BUN SERPL-MCNC: 15 MG/DL (ref 8–27)
BUN/CREAT SERPL: 16 (ref 12–28)
CALCIUM SERPL-MCNC: 10.2 MG/DL (ref 8.7–10.3)
CHLORIDE SERPL-SCNC: 95 MMOL/L (ref 96–106)
CHOLEST SERPL-MCNC: 206 MG/DL (ref 100–199)
CHOLEST/HDLC SERPL: 2.1 RATIO (ref 0–4.4)
CO2 SERPL-SCNC: 21 MMOL/L (ref 20–29)
CREAT SERPL-MCNC: 0.93 MG/DL (ref 0.57–1)
EOSINOPHIL # BLD AUTO: 0.2 X10E3/UL (ref 0–0.4)
EOSINOPHIL NFR BLD AUTO: 2 %
ERYTHROCYTE [DISTWIDTH] IN BLOOD BY AUTOMATED COUNT: 12.3 % (ref 11.7–15.4)
GLOBULIN SER CALC-MCNC: 2.5 G/DL (ref 1.5–4.5)
GLUCOSE SERPL-MCNC: 89 MG/DL (ref 65–99)
HCT VFR BLD AUTO: 49.4 % (ref 34–46.6)
HDLC SERPL-MCNC: 99 MG/DL
HGB BLD-MCNC: 16.8 G/DL (ref 11.1–15.9)
IMM GRANULOCYTES # BLD AUTO: 0 X10E3/UL (ref 0–0.1)
IMM GRANULOCYTES NFR BLD AUTO: 0 %
LDLC SERPL CALC-MCNC: 89 MG/DL (ref 0–99)
LYMPHOCYTES # BLD AUTO: 3.8 X10E3/UL (ref 0.7–3.1)
LYMPHOCYTES NFR BLD AUTO: 36 %
MCH RBC QN AUTO: 30.3 PG (ref 26.6–33)
MCHC RBC AUTO-ENTMCNC: 34 G/DL (ref 31.5–35.7)
MCV RBC AUTO: 89 FL (ref 79–97)
MONOCYTES # BLD AUTO: 0.7 X10E3/UL (ref 0.1–0.9)
MONOCYTES NFR BLD AUTO: 6 %
NEUTROPHILS # BLD AUTO: 5.8 X10E3/UL (ref 1.4–7)
NEUTROPHILS NFR BLD AUTO: 55 %
PLATELET # BLD AUTO: 306 X10E3/UL (ref 150–450)
POTASSIUM SERPL-SCNC: 4.6 MMOL/L (ref 3.5–5.2)
PROT SERPL-MCNC: 7.2 G/DL (ref 6–8.5)
RBC # BLD AUTO: 5.55 X10E6/UL (ref 3.77–5.28)
SODIUM SERPL-SCNC: 134 MMOL/L (ref 134–144)
TRIGL SERPL-MCNC: 106 MG/DL (ref 0–149)
TSH SERPL DL<=0.005 MIU/L-ACNC: 3.75 UIU/ML (ref 0.45–4.5)
VLDLC SERPL CALC-MCNC: 18 MG/DL (ref 5–40)
WBC # BLD AUTO: 10.6 X10E3/UL (ref 3.4–10.8)

## 2021-11-02 DIAGNOSIS — D58.2 ELEVATED HEMOGLOBIN (HCC): Primary | ICD-10-CM

## 2021-11-08 ENCOUNTER — OFFICE VISIT (OUTPATIENT)
Dept: CARDIOLOGY | Facility: CLINIC | Age: 86
End: 2021-11-08

## 2021-11-08 VITALS
HEART RATE: 69 BPM | HEIGHT: 61 IN | DIASTOLIC BLOOD PRESSURE: 80 MMHG | WEIGHT: 164.6 LBS | BODY MASS INDEX: 31.08 KG/M2 | SYSTOLIC BLOOD PRESSURE: 142 MMHG

## 2021-11-08 DIAGNOSIS — E78.5 HYPERLIPIDEMIA, UNSPECIFIED HYPERLIPIDEMIA TYPE: ICD-10-CM

## 2021-11-08 DIAGNOSIS — I49.3 PVC'S (PREMATURE VENTRICULAR CONTRACTIONS): Primary | ICD-10-CM

## 2021-11-08 DIAGNOSIS — I10 ESSENTIAL HYPERTENSION: ICD-10-CM

## 2021-11-08 PROBLEM — D58.2 ELEVATED HEMOGLOBIN (HCC): Status: ACTIVE | Noted: 2021-11-08

## 2021-11-08 PROCEDURE — 99214 OFFICE O/P EST MOD 30 MIN: CPT | Performed by: INTERNAL MEDICINE

## 2021-11-08 PROCEDURE — 93000 ELECTROCARDIOGRAM COMPLETE: CPT | Performed by: INTERNAL MEDICINE

## 2021-11-08 NOTE — PROGRESS NOTES
Date of Office Visit: 2021  Encounter Provider: Jannette Mcmahon MD  Place of Service: [facility] CARDIOLOGY  Patient Name: Isabel Handy  :1935      Patient ID:  Isabel Handy is a 85 y.o. female is here for  followup for PVCs and hypertension.        History of Present Illness    She is followed for PVCs.  She has history of hypertension, hyperlipidemia, history of esophageal strictures requiring dilation.     She woke with these on the morning of 3/15/2016.  She was sent to the chest pain center.  Her troponin was negative.  Her TSH was well-controlled.  They continued to cause issues for her as well some intermittent chest pain and pressure.  Because of her symptoms she was sent for dobutamine stress echocardiogram which was done 3/16/2016.  This showed no evidence of ischemia.  Her aortic valve had mild calcification on it.  Her baseline ejection fraction was 64%.  There was another valve disease.  She was sizes were normal.  He was a normal stress echocardiogram.  She also had a Holter recording done 3/15/2016.  This showed 100 PVCs per hour.  There were rare PACs.     She is a history of carotid stenosis diagnosed in the past.  She has thyroid disease which is normally control.  She is very strong family history of cardiac disease.  Both of her parents of myocardial infarctions and she's had 2 brothers with myocardial infarctions as well.    Echo on 2020 shows ejection fraction 66% with grade 1 diastolic dysfunction, aortic valve calcification without stenosis, mild tricuspid insufficiency.  Vascular screening, carotids only, done 2021 showed mild right carotid artery stenosis and plaque of the left carotid artery without stenosis.    Labs in 10/28/2021 show normal CMP, normal TSH, total cholesterol 206, HDL 99, LDL 89, triglycerides 106, globin 16.8, platelets 306, otherwise normal CBC.    She has an appoint with hematology due to elevated  hemoglobin.  She occasionally feels palpitations but is rare.  She is had no dizziness or syncope.  Sometimes she hears her pulse in her ear.  She is not exercising due to Covid.  She has no orthopnea or PND.  She has no exertional dyspnea.  She has no exertional chest tightness or pressure.  Overall, she is felt pretty well.    Past Medical History:   Diagnosis Date   • Arrhythmia    • GERD (gastroesophageal reflux disease)    • Hyperlipidemia    • Hypertension    • Hypothyroidism    • Skin cancer    • Subarachnoid hemorrhage (HCC)    • Vasodepressor syncope          Past Surgical History:   Procedure Laterality Date   • APPENDECTOMY N/A    • BLEPHAROPLASTY Bilateral    • BREAST CYST EXCISION      to remove cysts   • BREAST IMPLANT SURGERY Bilateral 05/20/2002    Dr. Marissa Cobos, [facility]   • BUNIONECTOMY Bilateral    • CATARACT EXTRACTION Bilateral    • COLONOSCOPY N/A 9/21/2018    Procedure: COLONOSCOPY into cecum and T.I. with cold biopsy polypectomy;  Surgeon: Roly Be MD;  Location: [facility] ENDOSCOPY;  Service: Gastroenterology   • CORRECTION HAMMER TOE Left 12/09/2013    Revision correction through an altered surgical field of the left second hammertoe, correciton of the left third hammertoe, DuVries plantar condylectomy of the left second metatarsal, Plantar DuVries condylectomy of the left third metatarsal, partial excision of the left fifth metatarsal head for alleviation of bunionette deformity-Dr. Jordan Montilla, [facility]   • ENDOSCOPY N/A 9/21/2018    Procedure: ESOPHAGOGASTRODUODENOSCOPY with esophageal dilation #54 Castellanos;  Surgeon: Roly Be MD;  Location: [facility] ENDOSCOPY;  Service: Gastroenterology   • ENDOSCOPY AND COLONOSCOPY N/A 12/29/2010    Hiatal hernia, chronic gastritis: biopsied, normal examined duodenum, normal examined ileum, mild diverticulosis sigmoid colon-Dr. Roly Be, [facility]   • TOTAL ABDOMINAL HYSTERECTOMY Bilateral        Current Outpatient Medications on File  Prior to Visit   Medication Sig Dispense Refill   • amitriptyline (ELAVIL) 25 MG tablet TAKE 1 TABLET BY MOUTH EVERY NIGHT 90 tablet 1   • aspirin 81 MG EC tablet Take 81 mg by mouth Daily.     • atorvastatin (LIPITOR) 20 MG tablet TAKE 1 TABLET BY MOUTH EVERY NIGHT 90 tablet 1   • esomeprazole (NexIUM) 20 MG capsule Take 20 mg by mouth every morning before breakfast.     • estradiol (ESTRACE) 0.1 MG/GM vaginal cream APPLY 1/4 INICH ON FINGERTIP AND APPLY AS DIRECTED EVERY DAY FOR 30 DAYS THEN EVERY OTHER DAY     • levothyroxine (SYNTHROID, LEVOTHROID) 75 MCG tablet TAKE 1 TABLET BY MOUTH DAILY 90 tablet 1   • lisinopril (PRINIVIL,ZESTRIL) 20 MG tablet TAKE 1 TABLET BY MOUTH DAILY 90 tablet 1   • meloxicam (MOBIC) 7.5 MG tablet Take 1 tablet by mouth Daily As Needed for Mild Pain . Take with food 30 tablet 6   • Menthol-Zinc Oxide (CALMOSEPTINE EX) Apply  topically.     • Multiple Vitamins-Minerals (MULTIVITAMIN WITH MINERALS) tablet tablet Take 1 tablet by mouth Daily.     • Probiotic Product (SOLUBLE FIBER/PROBIOTICS) chewable tablet Chew 1 each Daily.     • promethazine (PHENERGAN) 25 MG tablet Take 1 tablet by mouth Daily As Needed for Nausea or Vomiting. 30 tablet 3   • [DISCONTINUED] estradiol (ESTRACE) 0.5 MG tablet Take 1 tablet by mouth Daily. 90 tablet 2     No current facility-administered medications on file prior to visit.       Social History     Socioeconomic History   • Marital status:    Tobacco Use   • Smoking status: Former Smoker     Quit date: 10/10/1994     Years since quittin.0   • Smokeless tobacco: Never Used   Vaping Use   • Vaping Use: Never used   Substance and Sexual Activity   • Alcohol use: Yes     Alcohol/week: 1.0 standard drink     Types: 1 Glasses of wine per week     Comment: RARE///CAFFEINE USE: 1 CUP DAILY.    • Drug use: No   • Sexual activity: Defer           ROS    Procedures    ECG 12 Lead    Date/Time: 2021 11:42 AM  Performed by: Jannette Mcmahon  "MD  Authorized by: Jannette Mcmahon MD   Comparison: compared with previous ECG   Similar to previous ECG  Rhythm: sinus rhythm    Clinical impression: normal ECG                Objective:      Vitals:    11/08/21 1127 11/08/21 1132   BP: 142/80 142/80   BP Location: Left arm Right arm   Pulse: 69    Weight: 74.7 kg (164 lb 9.6 oz)    Height: 154.9 cm (61\")      Body mass index is 31.1 kg/m².    Vitals reviewed.   Constitutional:       General: Not in acute distress.     Appearance: Well-developed. Not diaphoretic.   Eyes:      General: No scleral icterus.     Conjunctiva/sclera: Conjunctivae normal.   HENT:      Head: Normocephalic and atraumatic.   Neck:      Thyroid: No thyromegaly.      Vascular: No carotid bruit or JVD.      Lymphadenopathy: No cervical adenopathy.   Pulmonary:      Effort: Pulmonary effort is normal. No respiratory distress.      Breath sounds: Normal breath sounds. No wheezing. No rhonchi. No rales.   Chest:      Chest wall: Not tender to palpatation.   Cardiovascular:      Normal rate. Regular rhythm.      Murmurs: There is a grade 3/6 systolic murmur at the URSB and ULSB.      No gallop.   Pulses:     Carotid: 2+ bilaterally with bruit on the left.     Radial: 2+ bilaterally.     Dorsalis pedis: 2+ bilaterally.     Posterior tibial: 2+ bilaterally.  Edema:     Peripheral edema absent.   Abdominal:      General: Bowel sounds are normal. There is no distension or abdominal bruit.      Palpations: Abdomen is soft. There is no abdominal mass.      Tenderness: There is no abdominal tenderness.   Musculoskeletal:         General: No deformity.      Extremities: No clubbing present.     Cervical back: Neck supple. Skin:     General: Skin is warm and dry. There is no cyanosis.      Coloration: Skin is not pale.      Findings: No rash.   Neurological:      Mental Status: Alert and oriented to person, place, and time.      Cranial Nerves: No cranial nerve deficit.   Psychiatric:         " Judgment: Judgment normal.         Lab Review:       Assessment:      Diagnosis Plan   1. PVC's (premature ventricular contractions)     2. Essential hypertension     3. Hyperlipidemia, unspecified hyperlipidemia type           1. PVCs.  She doesn't have any today.  They're intermittent.  2. left carotid bruit, no stenosis.    3. Family history cardiovascular disease.    4. Lower Extremity edema.  This is noncardiac.  5. Sedentary, encouraged exercise.   6. Elevated hemoglobin, has an appoint with hematology, stopped her estradiol due to this.  7. Aortic valve calcification causing murmur.       Plan:       Stop oral estradiol as it increases her risk of heart attack and stroke.  No other testing needed at this time, see back in 1 year.

## 2021-11-12 DIAGNOSIS — I10 ESSENTIAL HYPERTENSION: ICD-10-CM

## 2021-11-15 RX ORDER — ATORVASTATIN CALCIUM 20 MG/1
20 TABLET, FILM COATED ORAL NIGHTLY
Qty: 90 TABLET | Refills: 1 | Status: SHIPPED | OUTPATIENT
Start: 2021-11-15 | End: 2022-05-16

## 2021-11-15 RX ORDER — LISINOPRIL 20 MG/1
20 TABLET ORAL DAILY
Qty: 90 TABLET | Refills: 1 | Status: SHIPPED | OUTPATIENT
Start: 2021-11-15 | End: 2022-05-16

## 2021-11-24 ENCOUNTER — LAB (OUTPATIENT)
Dept: OTHER | Facility: HOSPITAL | Age: 86
End: 2021-11-24

## 2021-11-24 ENCOUNTER — CONSULT (OUTPATIENT)
Dept: ONCOLOGY | Facility: CLINIC | Age: 86
End: 2021-11-24

## 2021-11-24 VITALS
DIASTOLIC BLOOD PRESSURE: 83 MMHG | TEMPERATURE: 96.9 F | HEART RATE: 75 BPM | OXYGEN SATURATION: 97 % | HEIGHT: 62 IN | WEIGHT: 166.3 LBS | RESPIRATION RATE: 16 BRPM | SYSTOLIC BLOOD PRESSURE: 175 MMHG | BODY MASS INDEX: 30.6 KG/M2

## 2021-11-24 DIAGNOSIS — D75.1 POLYCYTHEMIA: Primary | ICD-10-CM

## 2021-11-24 DIAGNOSIS — D58.2 ELEVATED HEMOGLOBIN (HCC): Primary | ICD-10-CM

## 2021-11-24 DIAGNOSIS — D72.820 LYMPHOCYTOSIS: ICD-10-CM

## 2021-11-24 LAB
BASOPHILS # BLD AUTO: 0.07 10*3/MM3 (ref 0–0.2)
BASOPHILS NFR BLD AUTO: 0.6 % (ref 0–1.5)
DEPRECATED RDW RBC AUTO: 43.2 FL (ref 37–54)
EOSINOPHIL # BLD AUTO: 0.26 10*3/MM3 (ref 0–0.4)
EOSINOPHIL NFR BLD AUTO: 2.3 % (ref 0.3–6.2)
ERYTHROCYTE [DISTWIDTH] IN BLOOD BY AUTOMATED COUNT: 13.2 % (ref 12.3–15.4)
HCT VFR BLD AUTO: 49.3 % (ref 34–46.6)
HGB BLD-MCNC: 16.3 G/DL (ref 12–15.9)
IMM GRANULOCYTES # BLD AUTO: 0.03 10*3/MM3 (ref 0–0.05)
IMM GRANULOCYTES NFR BLD AUTO: 0.3 % (ref 0–0.5)
LYMPHOCYTES # BLD AUTO: 3.85 10*3/MM3 (ref 0.7–3.1)
LYMPHOCYTES NFR BLD AUTO: 34.6 % (ref 19.6–45.3)
MCH RBC QN AUTO: 29.5 PG (ref 26.6–33)
MCHC RBC AUTO-ENTMCNC: 33.1 G/DL (ref 31.5–35.7)
MCV RBC AUTO: 89.2 FL (ref 79–97)
MONOCYTES # BLD AUTO: 0.68 10*3/MM3 (ref 0.1–0.9)
MONOCYTES NFR BLD AUTO: 6.1 % (ref 5–12)
NEUTROPHILS NFR BLD AUTO: 56.1 % (ref 42.7–76)
NEUTROPHILS NFR BLD AUTO: 6.24 10*3/MM3 (ref 1.7–7)
NRBC BLD AUTO-RTO: 0 /100 WBC (ref 0–0.2)
PLATELET # BLD AUTO: 250 10*3/MM3 (ref 140–450)
PMV BLD AUTO: 11.4 FL (ref 6–12)
RBC # BLD AUTO: 5.53 10*6/MM3 (ref 3.77–5.28)
WBC NRBC COR # BLD: 11.13 10*3/MM3 (ref 3.4–10.8)

## 2021-11-24 PROCEDURE — 99204 OFFICE O/P NEW MOD 45 MIN: CPT | Performed by: INTERNAL MEDICINE

## 2021-11-24 PROCEDURE — 85025 COMPLETE CBC W/AUTO DIFF WBC: CPT | Performed by: INTERNAL MEDICINE

## 2021-11-24 PROCEDURE — 36415 COLL VENOUS BLD VENIPUNCTURE: CPT

## 2021-11-24 PROCEDURE — 88184 FLOWCYTOMETRY/ TC 1 MARKER: CPT | Performed by: INTERNAL MEDICINE

## 2021-11-24 PROCEDURE — 88185 FLOWCYTOMETRY/TC ADD-ON: CPT | Performed by: INTERNAL MEDICINE

## 2021-11-24 PROCEDURE — 88182 CELL MARKER STUDY: CPT | Performed by: INTERNAL MEDICINE

## 2021-11-24 RX ORDER — ASPIRIN 81 MG/1
81 TABLET ORAL DAILY
Start: 2021-11-24 | End: 2023-03-07

## 2021-11-24 NOTE — PROGRESS NOTES
Subjective     REASON FOR CONSULTATION:  Provide an opinion on any further workup or treatment on:    Elevated hemoglobin                       REQUESTING PHYSICIAN: Beth Figueredo APRN    RECORDS OBTAINED: Records of the patients history including those obtained from the referring provider were reviewed and summarized in detail.    HISTORY OF PRESENT ILLNESS:    Isabel Handy is a 85 y.o. patient who was referred for evaluation of elevated hemoglobin.    Patient reports being told that she has an elevated hemoglobin levels a few years ago.  She remembers being told to donate blood.  However, she did not do so.  She does not have an underlying lung disease or sleep apnea.  She does not smoke.  She has history of smoking and quit in 1994.  She denies having chest pain or shortness of breath.  She denies having abdominal pain.  She reports that she does not drink adequate amount of fluids on a regular basis.    Patient states that she was on estradiol until she stopped it 3 weeks ago.  She is on an estrogen vaginal cream every other day due to recurrent urinary tract infections.     REVIEW OF SYSTEMS:  Review of Systems   Constitutional: Negative for fatigue and fever.        Weight gain   HENT: Positive for hearing loss and tinnitus. Negative for nosebleeds and voice change.         Dry mouth   Eyes: Positive for itching. Negative for visual disturbance.   Respiratory: Negative for cough and shortness of breath.    Cardiovascular: Negative for chest pain and leg swelling.   Gastrointestinal: Negative for abdominal pain, blood in stool, constipation, diarrhea, nausea and vomiting.        Heartburn   Genitourinary: Positive for urgency. Negative for frequency and hematuria.   Musculoskeletal: Positive for arthralgias. Negative for back pain and joint swelling.   Skin: Negative for rash.   Neurological: Negative for dizziness and headaches.   Hematological: Negative for adenopathy. Does not bruise/bleed easily.    Psychiatric/Behavioral: Negative for dysphoric mood. The patient is not nervous/anxious.        Past Medical History:   Diagnosis Date   • Arrhythmia    • GERD (gastroesophageal reflux disease)    • History of pneumonia    • Hyperlipidemia    • Hypertension    • Hypothyroidism    • Skin cancer    • Subarachnoid hemorrhage (HCC)    • Vasodepressor syncope        Past Surgical History:   Procedure Laterality Date   • APPENDECTOMY N/A    • BLEPHAROPLASTY Bilateral    • BREAST CYST EXCISION      to remove cysts   • BREAST IMPLANT SURGERY Bilateral 2002    Dr. Marissa Cobos, [facility]   • BUNIONECTOMY Bilateral    • CATARACT EXTRACTION Bilateral    • COLONOSCOPY N/A 2018    Procedure: COLONOSCOPY into cecum and T.I. with cold biopsy polypectomy;  Surgeon: Roly Be MD;  Location: [facility] ENDOSCOPY;  Service: Gastroenterology   • CORRECTION HAMMER TOE Left 2013    Revision correction through an altered surgical field of the left second hammertoe, correciton of the left third hammertoe, DuVries plantar condylectomy of the left second metatarsal, Plantar DuVries condylectomy of the left third metatarsal, partial excision of the left fifth metatarsal head for alleviation of bunionette deformity-Dr. Jordan Montilla, [facility]   • ENDOSCOPY N/A 2018    Procedure: ESOPHAGOGASTRODUODENOSCOPY with esophageal dilation #54 Castellanos;  Surgeon: Roly Be MD;  Location: [facility] ENDOSCOPY;  Service: Gastroenterology   • ENDOSCOPY AND COLONOSCOPY N/A 2010    Hiatal hernia, chronic gastritis: biopsied, normal examined duodenum, normal examined ileum, mild diverticulosis sigmoid colon-Dr. Roly Be, [facility]   • TOTAL ABDOMINAL HYSTERECTOMY Bilateral        Social History     Socioeconomic History   • Marital status:    Tobacco Use   • Smoking status: Former Smoker     Quit date: 10/10/1994     Years since quittin.1   • Smokeless tobacco: Never Used   Vaping Use   • Vaping Use: Never used    Substance and Sexual Activity   • Alcohol use: Yes     Alcohol/week: 1.0 standard drink     Types: 1 Glasses of wine per week     Comment: RARE///CAFFEINE USE: 1 CUP DAILY.    • Drug use: No   • Sexual activity: Defer       Family History   Problem Relation Age of Onset   • Hypertension Mother    • Heart disease Mother    • Arthritis Mother    • Colon cancer Mother         Colon   • Colon polyps Mother    • Hypertension Father    • Heart disease Father    • Prostate cancer Father    • COPD Sister    • Hypertension Brother    • Heart disease Brother          of heart attack (2 brothers)   • Tuberculosis Brother    • Diabetes Paternal Aunt         MEDICATIONS:    Current Outpatient Medications:   •  amitriptyline (ELAVIL) 25 MG tablet, TAKE 1 TABLET BY MOUTH EVERY NIGHT, Disp: 90 tablet, Rfl: 1  •  aspirin 81 MG EC tablet, Take 81 mg by mouth Daily., Disp: , Rfl:   •  atorvastatin (LIPITOR) 20 MG tablet, TAKE 1 TABLET BY MOUTH EVERY NIGHT, Disp: 90 tablet, Rfl: 1  •  esomeprazole (NexIUM) 20 MG capsule, Take 20 mg by mouth every morning before breakfast., Disp: , Rfl:   •  estradiol (ESTRACE) 0.1 MG/GM vaginal cream, APPLY 1/4 INICH ON FINGERTIP AND APPLY AS DIRECTED EVERY DAY FOR 30 DAYS THEN EVERY OTHER DAY, Disp: , Rfl:   •  levothyroxine (SYNTHROID, LEVOTHROID) 75 MCG tablet, TAKE 1 TABLET BY MOUTH DAILY, Disp: 90 tablet, Rfl: 1  •  lisinopril (PRINIVIL,ZESTRIL) 20 MG tablet, TAKE 1 TABLET BY MOUTH DAILY, Disp: 90 tablet, Rfl: 1  •  meloxicam (MOBIC) 7.5 MG tablet, Take 1 tablet by mouth Daily As Needed for Mild Pain . Take with food, Disp: 30 tablet, Rfl: 6  •  Menthol-Zinc Oxide (CALMOSEPTINE EX), Apply  topically., Disp: , Rfl:   •  Multiple Vitamins-Minerals (MULTIVITAMIN WITH MINERALS) tablet tablet, Take 1 tablet by mouth Daily., Disp: , Rfl:   •  Probiotic Product (SOLUBLE FIBER/PROBIOTICS) chewable tablet, Chew 1 each Daily., Disp: , Rfl:   •  promethazine (PHENERGAN) 25 MG tablet, Take 1 tablet by  "mouth Daily As Needed for Nausea or Vomiting., Disp: 30 tablet, Rfl: 3     ALLERGIES:  Allergies   Allergen Reactions   • Gatifloxacin Arrhythmia   • Rosuvastatin Myalgia   • Bactrim [Sulfamethoxazole-Trimethoprim] Headache        Objective   VITAL SIGNS:  Vitals:    11/24/21 1506   BP: 175/83   Pulse: 75   Resp: 16   Temp: 96.9 °F (36.1 °C)   TempSrc: Temporal   SpO2: 97%   Weight: 75.4 kg (166 lb 4.8 oz)   Height: 157.5 cm (62.01\")   PainSc: 0-No pain       Wt Readings from Last 3 Encounters:   11/24/21 75.4 kg (166 lb 4.8 oz)   11/08/21 74.7 kg (164 lb 9.6 oz)   10/27/21 76.6 kg (168 lb 12.8 oz)       PHYSICAL EXAMINATION  GENERAL:  The patient appears in good general condition, not in acute distress.  SKIN: No skin rashes. No ecchymosis.  HEAD:  Normocephalic.  EYES:  No Jaundice. No Pallor. Pupils equal. EOMI.  NECK:  Supple with Good ROM. No Thyromegaly. No Masses.  LYMPHATICS:  No cervical, supraclavicular or axillary lymphadenopathy.  CHEST: Normal respiratory effort. Lungs clear to auscultation.   CARDIAC:  Normal S1 & S2.  Grade 2 systolic murmur at the aortic area.   ABDOMEN:  Soft. No tenderness. No Hepatomegaly. No Splenomegaly. No masses.  EXTREMITIES: No edema.  No calf tenderness.  NEUROLOGICAL:  No Focal neurological deficits.     RESULT REVIEW:   Results from last 7 days   Lab Units 11/24/21  1454   WBC 10*3/mm3 11.13*   NEUTROS ABS 10*3/mm3 6.24   HEMOGLOBIN g/dL 16.3*   HEMATOCRIT % 49.3*   PLATELETS 10*3/mm3 250     I reviewed the peripheral blood smear from today.  RBCs have normal morphology.  Atypical lymphocytes were identified.  Platelets were normal in size and number.    Lab Results   Component Value Date    FERRITIN 53.00 03/24/2020     Chest x-ray on 12/4/2020:  No acute airspace disease.  No change from 4/26/2019.    Assessment/Plan   *Polycythemia.  It was identified in the labs dating back to 2015.  Patient remembers being told that she needed to donate blood but she never did so.  " Hemoglobin is today at 16.3 and hematocrit is at 49.3%.  She is not having chest pain or shortness of breath.  She does not have underlying lung disease or sleep apnea.  She does not smoke.  The exact etiology is not clear.    *Lymphocytosis.  Lymphocyte count increased to 3600 on 5/17/2018 and has been on the high side since that time.  Lymphocyte count is today at 3850.  This may be indicative of underlying chronic myeloproliferative disorder like chronic lymphocytic leukemia.  She does not have lymphadenopathy or splenomegaly.    PLAN:    1.  I will obtain JAK2 V617F mutation, JAK2 exon 12-15 mutation, PEGGY reticulin and MPL mutation tests.  2.  I will obtain peripheral blood flow cytometry.  3.  I recommended increasing fluid intake.  I recommended drinking a total of 64 ounces a day.  4.  I recommended starting enteric-coated aspirin 81 mg a day.  5.  I recommended reducing intake of iron rich food items.  6.  I asked her to make sure her vitamin does not contain any iron.  7.  I will see her in follow-up in 2 months with repeat CBC.        Devan Salmeron MD  11/24/21

## 2021-11-29 LAB — REF LAB TEST METHOD: NORMAL

## 2021-12-06 ENCOUNTER — FLU SHOT (OUTPATIENT)
Dept: INTERNAL MEDICINE | Facility: CLINIC | Age: 86
End: 2021-12-06

## 2021-12-06 PROCEDURE — 90662 IIV NO PRSV INCREASED AG IM: CPT | Performed by: NURSE PRACTITIONER

## 2021-12-06 PROCEDURE — G0008 ADMIN INFLUENZA VIRUS VAC: HCPCS | Performed by: NURSE PRACTITIONER

## 2021-12-15 LAB
CALR EXON 9 MUT ANL BLD/T: NORMAL
REF LAB TEST METHOD: NORMAL

## 2021-12-27 ENCOUNTER — TELEPHONE (OUTPATIENT)
Dept: INTERNAL MEDICINE | Facility: CLINIC | Age: 86
End: 2021-12-27

## 2021-12-27 NOTE — TELEPHONE ENCOUNTER
PATIENT CALLED C/O BURNING, URGENCY TO URINATE. URINE LOOKS CLOUDY.  REQUESTED A PRESCRIPTION TO BE SENT TO HER LOCAL PHARMACY    [id number] - [place], [place] - [address] - [phone]  - [phone]    CB#: [phone]

## 2021-12-28 ENCOUNTER — TELEPHONE (OUTPATIENT)
Dept: INTERNAL MEDICINE | Facility: CLINIC | Age: 86
End: 2021-12-28

## 2021-12-28 RX ORDER — NITROFURANTOIN 25; 75 MG/1; MG/1
100 CAPSULE ORAL 2 TIMES DAILY
Qty: 10 CAPSULE | Refills: 0 | Status: SHIPPED | OUTPATIENT
Start: 2021-12-28 | End: 2022-01-02

## 2021-12-28 NOTE — TELEPHONE ENCOUNTER
----- Message from ED Mcgee sent at 12/28/2021  8:39 AM EST -----  Send urine for cx, start macrobid 100 mg PO BID X 5 days. Hydrate well, Probiotic daily, separate from antibiotic

## 2022-01-26 ENCOUNTER — OFFICE VISIT (OUTPATIENT)
Dept: ONCOLOGY | Facility: CLINIC | Age: 87
End: 2022-01-26

## 2022-01-26 ENCOUNTER — LAB (OUTPATIENT)
Dept: OTHER | Facility: HOSPITAL | Age: 87
End: 2022-01-26

## 2022-01-26 VITALS
BODY MASS INDEX: 30.75 KG/M2 | TEMPERATURE: 97.5 F | SYSTOLIC BLOOD PRESSURE: 130 MMHG | WEIGHT: 167.1 LBS | OXYGEN SATURATION: 94 % | DIASTOLIC BLOOD PRESSURE: 77 MMHG | RESPIRATION RATE: 16 BRPM | HEART RATE: 76 BPM | HEIGHT: 62 IN

## 2022-01-26 DIAGNOSIS — D72.820 LYMPHOCYTOSIS: ICD-10-CM

## 2022-01-26 DIAGNOSIS — M19.049 HAND ARTHRITIS: ICD-10-CM

## 2022-01-26 DIAGNOSIS — D75.1 POLYCYTHEMIA: ICD-10-CM

## 2022-01-26 DIAGNOSIS — D75.1 POLYCYTHEMIA: Primary | ICD-10-CM

## 2022-01-26 LAB
BASOPHILS # BLD AUTO: 0.05 10*3/MM3 (ref 0–0.2)
BASOPHILS NFR BLD AUTO: 0.5 % (ref 0–1.5)
DEPRECATED RDW RBC AUTO: 45.2 FL (ref 37–54)
EOSINOPHIL # BLD AUTO: 0.36 10*3/MM3 (ref 0–0.4)
EOSINOPHIL NFR BLD AUTO: 3.4 % (ref 0.3–6.2)
ERYTHROCYTE [DISTWIDTH] IN BLOOD BY AUTOMATED COUNT: 13.5 % (ref 12.3–15.4)
HCT VFR BLD AUTO: 47 % (ref 34–46.6)
HGB BLD-MCNC: 15.1 G/DL (ref 12–15.9)
IMM GRANULOCYTES # BLD AUTO: 0.03 10*3/MM3 (ref 0–0.05)
IMM GRANULOCYTES NFR BLD AUTO: 0.3 % (ref 0–0.5)
LYMPHOCYTES # BLD AUTO: 3.46 10*3/MM3 (ref 0.7–3.1)
LYMPHOCYTES NFR BLD AUTO: 33 % (ref 19.6–45.3)
MCH RBC QN AUTO: 29 PG (ref 26.6–33)
MCHC RBC AUTO-ENTMCNC: 32.1 G/DL (ref 31.5–35.7)
MCV RBC AUTO: 90.4 FL (ref 79–97)
MONOCYTES # BLD AUTO: 0.81 10*3/MM3 (ref 0.1–0.9)
MONOCYTES NFR BLD AUTO: 7.7 % (ref 5–12)
NEUTROPHILS NFR BLD AUTO: 5.79 10*3/MM3 (ref 1.7–7)
NEUTROPHILS NFR BLD AUTO: 55.1 % (ref 42.7–76)
NRBC BLD AUTO-RTO: 0 /100 WBC (ref 0–0.2)
PLATELET # BLD AUTO: 292 10*3/MM3 (ref 140–450)
PMV BLD AUTO: 10.8 FL (ref 6–12)
RBC # BLD AUTO: 5.2 10*6/MM3 (ref 3.77–5.28)
WBC NRBC COR # BLD: 10.5 10*3/MM3 (ref 3.4–10.8)

## 2022-01-26 PROCEDURE — 99214 OFFICE O/P EST MOD 30 MIN: CPT | Performed by: INTERNAL MEDICINE

## 2022-01-26 PROCEDURE — 36415 COLL VENOUS BLD VENIPUNCTURE: CPT

## 2022-01-26 PROCEDURE — 85025 COMPLETE CBC W/AUTO DIFF WBC: CPT | Performed by: INTERNAL MEDICINE

## 2022-01-26 NOTE — PROGRESS NOTES
Subjective     CHIEF COMPLAINT:      Chief Complaint   Patient presents with   • Follow-up     NO CONCERNS       HISTORY OF PRESENT ILLNESS:     Isabel Handy is a 86 y.o. female patient who returns today for follow up on her polycythemia and lymphocytosis.  She returns today for follow-up reporting no new symptoms.  She has arthritic changes in the hands but she denies having significant stiffness.  She denies having significant pain.  There is contracture of the left hand but it is not affecting her movements.    Patient increased her fluid intake since her initial visit in November 2021.  She checked on the multivitamin she has and it does not contain iron.    ROS:  Pertinent ROS is in the HPI.     Past medical, surgical, social and family history were reviewed.     MEDICATIONS:    Current Outpatient Medications:   •  amitriptyline (ELAVIL) 25 MG tablet, TAKE 1 TABLET BY MOUTH EVERY NIGHT, Disp: 90 tablet, Rfl: 1  •  aspirin (aspirin) 81 MG EC tablet, Take 1 tablet by mouth Daily., Disp: , Rfl:   •  atorvastatin (LIPITOR) 20 MG tablet, TAKE 1 TABLET BY MOUTH EVERY NIGHT, Disp: 90 tablet, Rfl: 1  •  esomeprazole (NexIUM) 20 MG capsule, Take 20 mg by mouth every morning before breakfast., Disp: , Rfl:   •  estradiol (ESTRACE) 0.1 MG/GM vaginal cream, APPLY 1/4 INICH ON FINGERTIP AND APPLY AS DIRECTED EVERY DAY FOR 30 DAYS THEN EVERY OTHER DAY, Disp: , Rfl:   •  levothyroxine (SYNTHROID, LEVOTHROID) 75 MCG tablet, TAKE 1 TABLET BY MOUTH DAILY, Disp: 90 tablet, Rfl: 1  •  lisinopril (PRINIVIL,ZESTRIL) 20 MG tablet, TAKE 1 TABLET BY MOUTH DAILY, Disp: 90 tablet, Rfl: 1  •  meloxicam (MOBIC) 7.5 MG tablet, Take 1 tablet by mouth Daily As Needed for Mild Pain . Take with food, Disp: 30 tablet, Rfl: 6  •  Multiple Vitamins-Minerals (MULTIVITAMIN WITH MINERALS) tablet tablet, Take 1 tablet by mouth Daily., Disp: , Rfl:   •  nystatin (MYCOSTATIN) 100,000 unit/mL suspension, Swish and swallow 5 mL 4 (Four) Times a Day.,  "Disp: 60 mL, Rfl: 0  •  Probiotic Product (SOLUBLE FIBER/PROBIOTICS) chewable tablet, Chew 1 each Daily., Disp: , Rfl:   •  promethazine (PHENERGAN) 25 MG tablet, Take 1 tablet by mouth Daily As Needed for Nausea or Vomiting., Disp: 30 tablet, Rfl: 3    Objective   VITAL SIGNS:     Vitals:    01/26/22 1447   BP: 130/77   Pulse: 76   Resp: 16   Temp: 97.5 °F (36.4 °C)   TempSrc: Temporal   SpO2: 94%   Weight: 75.8 kg (167 lb 1.6 oz)   Height: 157.5 cm (62.01\")   PainSc: 0-No pain     Body mass index is 30.56 kg/m².     Wt Readings from Last 5 Encounters:   01/26/22 75.8 kg (167 lb 1.6 oz)   11/24/21 75.4 kg (166 lb 4.8 oz)   11/08/21 74.7 kg (164 lb 9.6 oz)   10/27/21 76.6 kg (168 lb 12.8 oz)   05/24/21 73.9 kg (163 lb)       PHYSICAL EXAMINATION:   GENERAL: The patient appears in good general condition, not in acute distress.   SKIN: No ecchymosis.  EYES: No jaundice.  LYMPHATICS: No cervical, supraclavicular or axillary lymphadenopathy.  CHEST: Normal respiratory effort.   CVS: No edema.  ABDOMEN: Soft. No tenderness. No Hepatomegaly. No Splenomegaly. No masses.  EXTREMITIES: Bilateral arthritic changes in the fingers with mild joint deformities.  Dupuytren contracture in the left hand.     DIAGNOSTIC DATA:     Results from last 7 days   Lab Units 01/26/22  1414   WBC 10*3/mm3 10.50   NEUTROS ABS 10*3/mm3 5.79   HEMOGLOBIN g/dL 15.1   HEMATOCRIT % 47.0*   PLATELETS 10*3/mm3 292     Labs on 11/24/2021 revealed no evidence of JAK2 V617F, exon 12-15, CALReticulin or MPL mutation.    Peripheral blood flow cytometry on 11/24/2021 revealed Kappa skewed B cells.  This suggested an underlying clonal B-cell process.  Due to the low level, the differential diagnosis was considered to be monoclonal B-cell lymphocytosis or reactive clonal population.    Assessment/Plan   *Polycythemia.    · It was identified in the labs dating back to 2015.    · Patient reports being told that she needed to donate blood but she never did so.  "   · Hemoglobin was 16.3 with a hematocrit of 49.3% on 11/24/2021.    · Patient does not have underlying lung disease or sleep apnea.  She does not smoke.    · Testing for JAK2 V617F mutation, JAK2 exam 12-15 mutation, CALReticulin and MPL was negative.  · Patient was suspected of having an element of hemoconcentration.  · We advised the patient to increase fluid intake and cut down on intake of iron rich food.  · Hemoglobin decreased to 15.1 and hematocrit decreased to 47% today.  · hemoglobin is today at 16.3 and hematocrit is at 49.3%.  She is not having chest pain or shortness of breath.  She does not have underlying lung disease or sleep apnea.  She does not smoke.  The exact etiology is not clear.    *Lymphocytosis.    · Lymphocyte count increased to 3600 on 5/17/2018 and has been on the high side since that time.    · Lymphocyte count was 3850 on 11/24/2021.    · Peripheral blood flow cytometry revealed kappa restricted B cells.  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    · This may also be attributed to underlying reactive process.  Patient does have underlying arthritis ?  Connective tissue disease.  · Exam today revealed no lymphadenopathy or splenomegaly.      *Bilateral hand arthritis.  She has some joint deformities.  She has changes of Dupuytren contracture.  I recommended that she checks with her primary care physician for referral to rheumatology and hand surgery.    PLAN:    1.  Continue to maintain adequate hydration.   2.  Avoid iron in her supplements and multivitamins.  She is okay to continue her current multivitamin.  3.  Follow-up in 6 months.  We will obtain CBC LDH and uric acid.        Devan Salmeron MD  01/26/22

## 2022-02-02 DIAGNOSIS — E03.9 HYPOTHYROIDISM, UNSPECIFIED TYPE: ICD-10-CM

## 2022-02-02 RX ORDER — LEVOTHYROXINE SODIUM 0.07 MG/1
75 TABLET ORAL DAILY
Qty: 90 TABLET | Refills: 1 | Status: SHIPPED | OUTPATIENT
Start: 2022-02-02 | End: 2022-08-22

## 2022-05-13 DIAGNOSIS — I10 ESSENTIAL HYPERTENSION: ICD-10-CM

## 2022-05-16 RX ORDER — LISINOPRIL 20 MG/1
20 TABLET ORAL DAILY
Qty: 90 TABLET | Refills: 1 | Status: SHIPPED | OUTPATIENT
Start: 2022-05-16 | End: 2022-11-10

## 2022-05-16 RX ORDER — ATORVASTATIN CALCIUM 20 MG/1
20 TABLET, FILM COATED ORAL NIGHTLY
Qty: 90 TABLET | Refills: 1 | Status: SHIPPED | OUTPATIENT
Start: 2022-05-16 | End: 2022-11-10

## 2022-06-24 ENCOUNTER — APPOINTMENT (OUTPATIENT)
Dept: WOMENS IMAGING | Facility: HOSPITAL | Age: 87
End: 2022-06-24

## 2022-06-24 PROCEDURE — 77063 BREAST TOMOSYNTHESIS BI: CPT | Performed by: RADIOLOGY

## 2022-06-24 PROCEDURE — 77067 SCR MAMMO BI INCL CAD: CPT | Performed by: RADIOLOGY

## 2022-08-04 ENCOUNTER — OFFICE VISIT (OUTPATIENT)
Dept: INTERNAL MEDICINE | Facility: CLINIC | Age: 87
End: 2022-08-04

## 2022-08-04 VITALS
WEIGHT: 168.3 LBS | OXYGEN SATURATION: 99 % | BODY MASS INDEX: 29.82 KG/M2 | TEMPERATURE: 97.3 F | HEIGHT: 63 IN | HEART RATE: 86 BPM | DIASTOLIC BLOOD PRESSURE: 62 MMHG | SYSTOLIC BLOOD PRESSURE: 122 MMHG

## 2022-08-04 DIAGNOSIS — R30.0 DYSURIA: ICD-10-CM

## 2022-08-04 DIAGNOSIS — N30.00 ACUTE CYSTITIS WITHOUT HEMATURIA: ICD-10-CM

## 2022-08-04 DIAGNOSIS — R07.9 CHEST PAIN, UNSPECIFIED TYPE: Primary | ICD-10-CM

## 2022-08-04 DIAGNOSIS — N95.2 VAGINAL ATROPHY: ICD-10-CM

## 2022-08-04 DIAGNOSIS — F41.9 ANXIETY: ICD-10-CM

## 2022-08-04 DIAGNOSIS — K21.9 GASTROESOPHAGEAL REFLUX DISEASE, UNSPECIFIED WHETHER ESOPHAGITIS PRESENT: ICD-10-CM

## 2022-08-04 LAB
BILIRUB BLD-MCNC: NEGATIVE MG/DL
CLARITY, POC: CLEAR
COLOR UR: YELLOW
EXPIRATION DATE: ABNORMAL
GLUCOSE UR STRIP-MCNC: NEGATIVE MG/DL
KETONES UR QL: NEGATIVE
LEUKOCYTE EST, POC: ABNORMAL
Lab: ABNORMAL
NITRITE UR-MCNC: NEGATIVE MG/ML
PH UR: 6 [PH] (ref 5–8)
PROT UR STRIP-MCNC: NEGATIVE MG/DL
RBC # UR STRIP: NEGATIVE /UL
SP GR UR: 1 (ref 1–1.03)
UROBILINOGEN UR QL: NORMAL

## 2022-08-04 PROCEDURE — 81003 URINALYSIS AUTO W/O SCOPE: CPT | Performed by: NURSE PRACTITIONER

## 2022-08-04 PROCEDURE — 99214 OFFICE O/P EST MOD 30 MIN: CPT | Performed by: NURSE PRACTITIONER

## 2022-08-04 PROCEDURE — 93000 ELECTROCARDIOGRAM COMPLETE: CPT | Performed by: NURSE PRACTITIONER

## 2022-08-04 RX ORDER — ESOMEPRAZOLE MAGNESIUM 40 MG/1
40 CAPSULE, DELAYED RELEASE ORAL
Qty: 90 CAPSULE | Refills: 1 | Status: SHIPPED | OUTPATIENT
Start: 2022-08-04 | End: 2022-09-15

## 2022-08-04 RX ORDER — PHENAZOPYRIDINE HYDROCHLORIDE 200 MG/1
200 TABLET, FILM COATED ORAL 3 TIMES DAILY PRN
Qty: 30 TABLET | Refills: 0 | Status: SHIPPED | OUTPATIENT
Start: 2022-08-04 | End: 2023-01-17

## 2022-08-04 RX ORDER — ESTRADIOL 0.1 MG/G
2 CREAM VAGINAL 2 TIMES WEEKLY
Qty: 42.5 G | Refills: 7 | Status: SHIPPED | OUTPATIENT
Start: 2022-08-04 | End: 2023-03-07

## 2022-08-04 RX ORDER — NITROFURANTOIN MACROCRYSTALS 100 MG/1
100 CAPSULE ORAL 4 TIMES DAILY
COMMUNITY
End: 2022-09-15

## 2022-08-04 RX ORDER — ESCITALOPRAM OXALATE 10 MG/1
10 TABLET ORAL DAILY
Qty: 30 TABLET | Refills: 6 | OUTPATIENT
Start: 2022-08-04 | End: 2022-09-08

## 2022-08-04 NOTE — PROGRESS NOTES
"Subjective   Isabel Handy is a 86 y.o. female. Chest pressure     History of Present Illness    The patient is here today with c/o chest pain with lying down. Worse in am upon awakening. Thinks its reflux. Does not feel like typical reflux.  She does not have chest pain or SOB with being active during the day.     When she gets up she does feel better.     \"My nerves are a mess.\" I feel like I want to cry.     Currently has a UTI, has them frequently. She was put on estradiol cream but ran out.     UTI- she has been taking nitrofurantoin, started last week, she has 4 pills left. Took azo 1-2 days ago.     She does not snore. She has not been sleeping well, better the last few days.   The following portions of the patient's history were reviewed and updated as appropriate: allergies, current medications, past family history, past medical history, past social history, past surgical history and problem list.    Review of Systems   Respiratory: Negative.    Cardiovascular: Positive for chest pain.   Gastrointestinal: Negative.    Genitourinary: Positive for dysuria and urgency. Negative for frequency.       Objective   Physical Exam  Constitutional:       Appearance: Normal appearance. She is well-developed.   Neck:      Thyroid: No thyromegaly.   Cardiovascular:      Rate and Rhythm: Normal rate and regular rhythm.      Heart sounds: Normal heart sounds.   Pulmonary:      Effort: Pulmonary effort is normal.      Breath sounds: Normal breath sounds.   Abdominal:      General: Abdomen is protuberant. Bowel sounds are normal.      Tenderness: There is no abdominal tenderness.   Musculoskeletal:      Cervical back: Normal range of motion and neck supple.   Lymphadenopathy:      Cervical: No cervical adenopathy.   Skin:     General: Skin is warm and dry.   Neurological:      Mental Status: She is alert.   Psychiatric:         Behavior: Behavior normal.         Thought Content: Thought content normal.         Judgment: " Judgment normal.           Vitals:    08/04/22 1146   BP: 122/62   Pulse: 86   Temp: 97.3 °F (36.3 °C)   SpO2: 99%     Body mass index is 29.82 kg/m².      Assessment & Plan   Diagnoses and all orders for this visit:    1. Chest pain, unspecified type (Primary)    2. Dysuria  -     POCT urinalysis dipstick, automated  -     Urine Culture - Urine, Urine, Clean Catch    3. Gastroesophageal reflux disease, unspecified whether esophagitis present  -     esomeprazole (nexIUM) 40 MG capsule; Take 1 capsule by mouth Every Morning Before Breakfast.  Dispense: 90 capsule; Refill: 1    4. Anxiety    5. Acute cystitis without hematuria  -     phenazopyridine (Pyridium) 200 MG tablet; Take 1 tablet by mouth 3 (Three) Times a Day As Needed for Bladder Spasms.  Dispense: 30 tablet; Refill: 0    6. Vaginal atrophy  -     estradiol (ESTRACE VAGINAL) 0.1 MG/GM vaginal cream; Insert 2 g into the vagina 2 (Two) Times a Week.  Dispense: 42.5 g; Refill: 7    Other orders  -     ECG 12 Lead  -     escitalopram (Lexapro) 10 MG tablet; Take 1 tablet by mouth Daily.  Dispense: 30 tablet; Refill: 6        Current Outpatient Medications:   •  amitriptyline (ELAVIL) 25 MG tablet, TAKE 1 TABLET BY MOUTH EVERY NIGHT, Disp: 90 tablet, Rfl: 1  •  aspirin (aspirin) 81 MG EC tablet, Take 1 tablet by mouth Daily., Disp: , Rfl:   •  atorvastatin (LIPITOR) 20 MG tablet, TAKE 1 TABLET BY MOUTH EVERY NIGHT, Disp: 90 tablet, Rfl: 1  •  levothyroxine (SYNTHROID, LEVOTHROID) 75 MCG tablet, TAKE 1 TABLET BY MOUTH DAILY, Disp: 90 tablet, Rfl: 1  •  lisinopril (PRINIVIL,ZESTRIL) 20 MG tablet, TAKE 1 TABLET BY MOUTH DAILY, Disp: 90 tablet, Rfl: 1  •  meloxicam (MOBIC) 7.5 MG tablet, Take 1 tablet by mouth Daily As Needed for Mild Pain . Take with food, Disp: 30 tablet, Rfl: 6  •  Multiple Vitamins-Minerals (MULTIVITAMIN WITH MINERALS) tablet tablet, Take 1 tablet by mouth Daily., Disp: , Rfl:   •  nitrofurantoin (MACRODANTIN) 100 MG capsule, Take 100 mg by  mouth 4 (Four) Times a Day., Disp: , Rfl:   •  Probiotic Product (SOLUBLE FIBER/PROBIOTICS) chewable tablet, Chew 1 each Daily., Disp: , Rfl:   •  escitalopram (Lexapro) 10 MG tablet, Take 1 tablet by mouth Daily., Disp: 30 tablet, Rfl: 6  •  esomeprazole (nexIUM) 40 MG capsule, Take 1 capsule by mouth Every Morning Before Breakfast., Disp: 90 capsule, Rfl: 1  •  estradiol (ESTRACE VAGINAL) 0.1 MG/GM vaginal cream, Insert 2 g into the vagina 2 (Two) Times a Week., Disp: 42.5 g, Rfl: 7  •  phenazopyridine (Pyridium) 200 MG tablet, Take 1 tablet by mouth 3 (Three) Times a Day As Needed for Bladder Spasms., Disp: 30 tablet, Rfl: 0      ECG 12 Lead    Date/Time: 8/4/2022 11:59 AM  Performed by: Beth Figueredo APRN  Authorized by: Beth Figueredo APRN   Comparison: compared with previous ECG from 11/8/2021  Similar to previous ECG  Rhythm: sinus rhythm  Ectopy: unifocal PVCs  Rate: normal  Conduction: conduction normal  ST Segments: ST segments normal  QRS axis: normal    Clinical impression: normal ECG               1. Chest pain- EKG today ok, will treat GERD more aggressively, to ER with severe symptoms  2. GERD- increase nexium to 40 mg daily, low acid diet   3. Anxiety- try lexapro, f.u in 4 weeks   4. UTI -complete antibiotic, will give Rx for pyridium, not not take this when off of antibiotic, notify for continued symptoms. Restart vaginal estrogen

## 2022-08-06 LAB
BACTERIA UR CULT: NO GROWTH
BACTERIA UR CULT: NORMAL

## 2022-08-10 ENCOUNTER — OFFICE VISIT (OUTPATIENT)
Dept: ONCOLOGY | Facility: CLINIC | Age: 87
End: 2022-08-10

## 2022-08-10 ENCOUNTER — LAB (OUTPATIENT)
Dept: OTHER | Facility: HOSPITAL | Age: 87
End: 2022-08-10

## 2022-08-10 VITALS
RESPIRATION RATE: 16 BRPM | OXYGEN SATURATION: 94 % | BODY MASS INDEX: 30.66 KG/M2 | HEART RATE: 69 BPM | TEMPERATURE: 97.1 F | DIASTOLIC BLOOD PRESSURE: 71 MMHG | WEIGHT: 166.6 LBS | HEIGHT: 62 IN | SYSTOLIC BLOOD PRESSURE: 123 MMHG

## 2022-08-10 DIAGNOSIS — D72.820 LYMPHOCYTOSIS: ICD-10-CM

## 2022-08-10 DIAGNOSIS — D75.1 POLYCYTHEMIA: ICD-10-CM

## 2022-08-10 DIAGNOSIS — D75.1 POLYCYTHEMIA: Primary | ICD-10-CM

## 2022-08-10 LAB
BASOPHILS # BLD AUTO: 0.09 10*3/MM3 (ref 0–0.2)
BASOPHILS NFR BLD AUTO: 0.7 % (ref 0–1.5)
DEPRECATED RDW RBC AUTO: 47.5 FL (ref 37–54)
EOSINOPHIL # BLD AUTO: 0.29 10*3/MM3 (ref 0–0.4)
EOSINOPHIL NFR BLD AUTO: 2.2 % (ref 0.3–6.2)
ERYTHROCYTE [DISTWIDTH] IN BLOOD BY AUTOMATED COUNT: 13.4 % (ref 12.3–15.4)
HCT VFR BLD AUTO: 47.8 % (ref 34–46.6)
HGB BLD-MCNC: 15 G/DL (ref 12–15.9)
IMM GRANULOCYTES # BLD AUTO: 0.06 10*3/MM3 (ref 0–0.05)
IMM GRANULOCYTES NFR BLD AUTO: 0.5 % (ref 0–0.5)
LDH SERPL-CCNC: 187 U/L (ref 135–214)
LYMPHOCYTES # BLD AUTO: 3.42 10*3/MM3 (ref 0.7–3.1)
LYMPHOCYTES NFR BLD AUTO: 26.4 % (ref 19.6–45.3)
MCH RBC QN AUTO: 29.8 PG (ref 26.6–33)
MCHC RBC AUTO-ENTMCNC: 31.4 G/DL (ref 31.5–35.7)
MCV RBC AUTO: 95 FL (ref 79–97)
MONOCYTES # BLD AUTO: 0.93 10*3/MM3 (ref 0.1–0.9)
MONOCYTES NFR BLD AUTO: 7.2 % (ref 5–12)
NEUTROPHILS NFR BLD AUTO: 63 % (ref 42.7–76)
NEUTROPHILS NFR BLD AUTO: 8.15 10*3/MM3 (ref 1.7–7)
NRBC BLD AUTO-RTO: 0 /100 WBC (ref 0–0.2)
PLATELET # BLD AUTO: 320 10*3/MM3 (ref 140–450)
PMV BLD AUTO: 11.3 FL (ref 6–12)
RBC # BLD AUTO: 5.03 10*6/MM3 (ref 3.77–5.28)
URATE SERPL-MCNC: 5 MG/DL (ref 2.4–5.7)
WBC NRBC COR # BLD: 12.94 10*3/MM3 (ref 3.4–10.8)

## 2022-08-10 PROCEDURE — 83615 LACTATE (LD) (LDH) ENZYME: CPT | Performed by: INTERNAL MEDICINE

## 2022-08-10 PROCEDURE — 99214 OFFICE O/P EST MOD 30 MIN: CPT | Performed by: INTERNAL MEDICINE

## 2022-08-10 PROCEDURE — 85025 COMPLETE CBC W/AUTO DIFF WBC: CPT | Performed by: INTERNAL MEDICINE

## 2022-08-10 PROCEDURE — 84550 ASSAY OF BLOOD/URIC ACID: CPT | Performed by: INTERNAL MEDICINE

## 2022-08-10 PROCEDURE — 36415 COLL VENOUS BLD VENIPUNCTURE: CPT

## 2022-08-10 NOTE — PROGRESS NOTES
Subjective     CHIEF COMPLAINT:      Chief Complaint   Patient presents with   • Follow-up     No concerns       HISTORY OF PRESENT ILLNESS:     Isabel Handy is a 86 y.o. female patient who returns today for follow up on her polycythemia and lymphocytosis. She returns today for follow up reporting a recent UTI. Her primary nurse practitioner placed her on an antibiotic. She reports improvement in the symptoms.     Patient denies having chest pain or shortness of breath. She reports drinking about 64 ounces of fluids daily.     Patient reports that she cut down on iron in her diet.    ROS:  Pertinent ROS is in the HPI.     Past medical, surgical, social and family history were reviewed.     MEDICATIONS:    Current Outpatient Medications:   •  amitriptyline (ELAVIL) 25 MG tablet, TAKE 1 TABLET BY MOUTH EVERY NIGHT, Disp: 90 tablet, Rfl: 1  •  aspirin (aspirin) 81 MG EC tablet, Take 1 tablet by mouth Daily., Disp: , Rfl:   •  atorvastatin (LIPITOR) 20 MG tablet, TAKE 1 TABLET BY MOUTH EVERY NIGHT, Disp: 90 tablet, Rfl: 1  •  escitalopram (Lexapro) 10 MG tablet, Take 1 tablet by mouth Daily., Disp: 30 tablet, Rfl: 6  •  esomeprazole (nexIUM) 40 MG capsule, Take 1 capsule by mouth Every Morning Before Breakfast., Disp: 90 capsule, Rfl: 1  •  estradiol (ESTRACE VAGINAL) 0.1 MG/GM vaginal cream, Insert 2 g into the vagina 2 (Two) Times a Week., Disp: 42.5 g, Rfl: 7  •  levothyroxine (SYNTHROID, LEVOTHROID) 75 MCG tablet, TAKE 1 TABLET BY MOUTH DAILY, Disp: 90 tablet, Rfl: 1  •  lisinopril (PRINIVIL,ZESTRIL) 20 MG tablet, TAKE 1 TABLET BY MOUTH DAILY, Disp: 90 tablet, Rfl: 1  •  meloxicam (MOBIC) 7.5 MG tablet, Take 1 tablet by mouth Daily As Needed for Mild Pain . Take with food, Disp: 30 tablet, Rfl: 6  •  Multiple Vitamins-Minerals (MULTIVITAMIN WITH MINERALS) tablet tablet, Take 1 tablet by mouth Daily., Disp: , Rfl:   •  nitrofurantoin (MACRODANTIN) 100 MG capsule, Take 100 mg by mouth 4 (Four) Times a Day., Disp:  ", Rfl:   •  phenazopyridine (Pyridium) 200 MG tablet, Take 1 tablet by mouth 3 (Three) Times a Day As Needed for Bladder Spasms., Disp: 30 tablet, Rfl: 0  •  Probiotic Product (SOLUBLE FIBER/PROBIOTICS) chewable tablet, Chew 1 each Daily., Disp: , Rfl:   Objective     VITAL SIGNS:     Vitals:    08/10/22 1521   BP: 123/71   Pulse: 69   Resp: 16   Temp: 97.1 °F (36.2 °C)   TempSrc: Temporal   SpO2: 94%   Weight: 75.6 kg (166 lb 9.6 oz)   Height: 157.5 cm (62.01\")   PainSc: 0-No pain     Body mass index is 30.46 kg/m².     Wt Readings from Last 5 Encounters:   08/10/22 75.6 kg (166 lb 9.6 oz)   08/04/22 76.3 kg (168 lb 4.8 oz)   05/05/22 72.6 kg (160 lb)   01/26/22 75.8 kg (167 lb 1.6 oz)   11/24/21 75.4 kg (166 lb 4.8 oz)     PHYSICAL EXAMINATION:   GENERAL: The patient appears in good general condition, not in acute distress.   SKIN: No ecchymosis.  EYES: No jaundice. No pallor.  LYMPHATICS: No cervical, supraclavicular or axillary lymphadenopathy.  CHEST: Normal respiratory effort. Normal breathing sounds bilaterally. No added sounds.  CVS: Normal S1 and S2. Grade II systolic murmur.   ABDOMEN: Soft. No tenderness. No Hepatomegaly. No Splenomegaly. No masses.    DIAGNOSTIC DATA:     Results from last 7 days   Lab Units 08/10/22  1446   WBC 10*3/mm3 12.94*   NEUTROS ABS 10*3/mm3 8.15*   HEMOGLOBIN g/dL 15.0   HEMATOCRIT % 47.8*   PLATELETS 10*3/mm3 320     Component      Latest Ref Rng & Units 3/23/2021 11/24/2021 1/26/2022 8/10/2022   Neutrophils Absolute      1.70 - 7.00 10*3/mm3 4.17 6.24 5.79 8.15 (H)   Lymphocytes Absolute      0.70 - 3.10 10*3/mm3 3.35 (H) 3.85 (H) 3.46 (H) 3.42 (H)   Monocytes Absolute      0.10 - 0.90 10*3/mm3 0.56 0.68 0.81 0.93 (H)   Eosinophils Absolute      0.00 - 0.40 10*3/mm3 0.28 0.26 0.36 0.29   Basophils Absolute      0.00 - 0.20 10*3/mm3 0.06 0.07 0.05 0.09   Immature Grans, Absolute      0.00 - 0.05 10*3/mm3 0.03 0.03 0.03 0.06 (H)     Component      Latest Ref Rng & Units " 8/10/2022   LDH      135 - 214 U/L 187   Uric Acid      2.4 - 5.7 mg/dL 5.0     Assessment & Plan    *Polycythemia.    · It was identified in the labs dating back to 2015.    · Patient reports being told that she needed to donate blood but she never did so.    · Hemoglobin was 16.3 with a hematocrit of 49.3% on 11/24/2021.    · Patient does not have underlying lung disease or sleep apnea.  She does not smoke.    · Testing for JAK2 V617F mutation, JAK2 exam 12-15 mutation, CALReticulin and MPL was negative.  · Patient was suspected of having an element of hemoconcentration.  · We advised the patient to increase fluid intake and cut down on intake of iron rich food.  · Hemoglobin decreased to 15.1 and hematocrit decreased to 47% today.  · hemoglobin increased to 16.3 on 1/26/2022.  Hematocrit was 49.3%.   · We advised the patient to maintain adequate hydration and reduce intake of iron rich food.  · Hemoglobin improved to 15.0 and hematocrit improved to 47.8% today.  · Patient is no having symptoms related to the polycythemia.     *Lymphocytosis.    · Lymphocyte count increased to 3600 on 5/17/2018 and has been on the high side since that time.    · Lymphocyte count was 3850 on 11/24/2021.    · Peripheral blood flow cytometry on 11/24/2021 revealed kappa restricted B cells.  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    · This may also be attributed to underlying reactive process.   · Total WBC increased to 12,940 today with increase in the neutrophil count to 8,150. This is attributed to a recent UTI.   · Lymphocyte count is 3420 today.  · Exam today revealed no lymphadenopathy or splenomegaly.   · No evidence to suggest development of a lymphoproliferative disorder.     PLAN:    1.  Patient does not need a therapeutic phlebotomy.  2.  I asked her to continue the current measures of maintaining adequate hydration and cutting down on iron rich food.   3.  We will see her in follow up in 9  months with a CBC LDH and uric acid.      Devan Salmeron MD  08/10/22

## 2022-08-20 DIAGNOSIS — E03.9 HYPOTHYROIDISM, UNSPECIFIED TYPE: ICD-10-CM

## 2022-08-22 RX ORDER — LEVOTHYROXINE SODIUM 0.07 MG/1
75 TABLET ORAL DAILY
Qty: 90 TABLET | Refills: 1 | Status: SHIPPED | OUTPATIENT
Start: 2022-08-22 | End: 2023-01-24

## 2022-08-29 ENCOUNTER — TELEPHONE (OUTPATIENT)
Dept: INTERNAL MEDICINE | Facility: CLINIC | Age: 87
End: 2022-08-29

## 2022-08-29 RX ORDER — AMOXICILLIN AND CLAVULANATE POTASSIUM 875; 125 MG/1; MG/1
1 TABLET, FILM COATED ORAL 2 TIMES DAILY
Qty: 14 TABLET | Refills: 0 | Status: SHIPPED | OUTPATIENT
Start: 2022-08-29 | End: 2022-09-15

## 2022-08-29 NOTE — TELEPHONE ENCOUNTER
Hub staff attempted to follow warm transfer process and was unsuccessful     Caller: Isabel Handy    Relationship to patient: Self    Best call back number: [phone] (H)    Patient is needing: RETURNING CALL FROM OFFICE     POSSIBLY ABOUT THE URINE SAMPLE         .”

## 2022-09-15 ENCOUNTER — OFFICE VISIT (OUTPATIENT)
Dept: INTERNAL MEDICINE | Facility: CLINIC | Age: 87
End: 2022-09-15

## 2022-09-15 VITALS
BODY MASS INDEX: 30.55 KG/M2 | OXYGEN SATURATION: 98 % | WEIGHT: 166 LBS | TEMPERATURE: 97.1 F | DIASTOLIC BLOOD PRESSURE: 78 MMHG | HEIGHT: 62 IN | HEART RATE: 99 BPM | SYSTOLIC BLOOD PRESSURE: 142 MMHG

## 2022-09-15 DIAGNOSIS — N39.0 FREQUENT UTI: ICD-10-CM

## 2022-09-15 DIAGNOSIS — F41.9 ANXIETY: ICD-10-CM

## 2022-09-15 DIAGNOSIS — K21.9 GASTROESOPHAGEAL REFLUX DISEASE, UNSPECIFIED WHETHER ESOPHAGITIS PRESENT: ICD-10-CM

## 2022-09-15 DIAGNOSIS — I10 ESSENTIAL HYPERTENSION: ICD-10-CM

## 2022-09-15 DIAGNOSIS — Z00.00 MEDICARE ANNUAL WELLNESS VISIT, SUBSEQUENT: Primary | ICD-10-CM

## 2022-09-15 DIAGNOSIS — Z78.0 POST-MENOPAUSE: ICD-10-CM

## 2022-09-15 LAB
BILIRUB BLD-MCNC: NEGATIVE MG/DL
CLARITY, POC: CLEAR
COLOR UR: YELLOW
EXPIRATION DATE: ABNORMAL
GLUCOSE UR STRIP-MCNC: NEGATIVE MG/DL
KETONES UR QL: NEGATIVE
LEUKOCYTE EST, POC: ABNORMAL
Lab: 1236
NITRITE UR-MCNC: NEGATIVE MG/ML
PH UR: 6.5 [PH] (ref 5–8)
PROT UR STRIP-MCNC: NEGATIVE MG/DL
RBC # UR STRIP: ABNORMAL /UL
SP GR UR: 1 (ref 1–1.03)
UROBILINOGEN UR QL: ABNORMAL

## 2022-09-15 PROCEDURE — 1170F FXNL STATUS ASSESSED: CPT | Performed by: NURSE PRACTITIONER

## 2022-09-15 PROCEDURE — 99214 OFFICE O/P EST MOD 30 MIN: CPT | Performed by: NURSE PRACTITIONER

## 2022-09-15 PROCEDURE — 81003 URINALYSIS AUTO W/O SCOPE: CPT | Performed by: NURSE PRACTITIONER

## 2022-09-15 PROCEDURE — 1159F MED LIST DOCD IN RCRD: CPT | Performed by: NURSE PRACTITIONER

## 2022-09-15 PROCEDURE — G0439 PPPS, SUBSEQ VISIT: HCPCS | Performed by: NURSE PRACTITIONER

## 2022-09-15 RX ORDER — SERTRALINE HYDROCHLORIDE 25 MG/1
25 TABLET, FILM COATED ORAL DAILY
Qty: 30 TABLET | Refills: 6 | Status: SHIPPED | OUTPATIENT
Start: 2022-09-15 | End: 2022-11-17 | Stop reason: ALTCHOICE

## 2022-09-15 RX ORDER — NITROFURANTOIN 25; 75 MG/1; MG/1
100 CAPSULE ORAL 2 TIMES DAILY
Qty: 10 CAPSULE | Refills: 0 | Status: SHIPPED | OUTPATIENT
Start: 2022-09-15 | End: 2022-09-20

## 2022-09-15 RX ORDER — OMEPRAZOLE 40 MG/1
40 CAPSULE, DELAYED RELEASE ORAL DAILY
Qty: 90 CAPSULE | Refills: 1 | Status: SHIPPED | OUTPATIENT
Start: 2022-09-15 | End: 2022-11-17

## 2022-09-15 NOTE — PATIENT INSTRUCTIONS
Medicare Wellness  Personal Prevention Plan of Service     Date of Office Visit:    Encounter Provider:  ED Morris  Place of Service:  [facility]  Patient Name: Isabel Handy  :  1935    As part of the Medicare Wellness portion of your visit today, we are providing you with this personalized preventive plan of services (PPPS). This plan is based upon recommendations of the [place] Preventive Services Task Force (USPSTF) and the Advisory Committee on Immunization Practices (ACIP).    This lists the preventive care services that should be considered, and provides dates of when you are due. Items listed as completed are up-to-date and do not require any further intervention.    Health Maintenance   Topic Date Due    TDAP/TD VACCINES (1 - Tdap) Never done    COVID-19 Vaccine (4 - Booster for Moderna series) 03/10/2022    ANNUAL WELLNESS VISIT  2022    MAMMOGRAM  2022    ZOSTER VACCINE (1 of 2) 10/13/2022 (Originally 1987)    INFLUENZA VACCINE  10/01/2022    LIPID PANEL  10/28/2022    COLORECTAL CANCER SCREENING  2023    DXA SCAN  10/11/2023    Pneumococcal Vaccine 65+  Completed       No orders of the defined types were placed in this encounter.      No follow-ups on file.

## 2022-09-15 NOTE — PROGRESS NOTES
The ABCs of the Annual Wellness Visit  Subsequent Medicare Wellness Visit    Chief Complaint   Patient presents with   • Medicare Wellness-subsequent      Subjective    History of Present Illness:  Isabel Handy is a 86 y.o. female who presents for a Subsequent Medicare Wellness Visit.    The following portions of the patient's history were reviewed and   updated as appropriate: allergies, current medications, past family history, past medical history, past social history, past surgical history and problem list.    Compared to one year ago, the patient feels her physical   health is worse.  Due to wt gain  Compared to one year ago, the patient feels her mental   health is the same.    Recent Hospitalizations:  She was not admitted to the hospital during the last year.       Current Medical Providers:  Patient Care Team:  Beth Figueredo APRN as PCP - General (Family Medicine)  Beth Figueredo APRN as PCP - Internal Medicine (Internal Medicine)  Beth Figueredo APRN as Referring Physician (Family Medicine)  Devan Salmeron MD as Consulting Physician (Hematology and Oncology)    Outpatient Medications Prior to Visit   Medication Sig Dispense Refill   • amitriptyline (ELAVIL) 25 MG tablet TAKE 1 TABLET BY MOUTH EVERY NIGHT 90 tablet 1   • aspirin (aspirin) 81 MG EC tablet Take 1 tablet by mouth Daily.     • atorvastatin (LIPITOR) 20 MG tablet TAKE 1 TABLET BY MOUTH EVERY NIGHT 90 tablet 1   • estradiol (ESTRACE VAGINAL) 0.1 MG/GM vaginal cream Insert 2 g into the vagina 2 (Two) Times a Week. 42.5 g 7   • levothyroxine (SYNTHROID, LEVOTHROID) 75 MCG tablet TAKE 1 TABLET BY MOUTH DAILY 90 tablet 1   • lisinopril (PRINIVIL,ZESTRIL) 20 MG tablet TAKE 1 TABLET BY MOUTH DAILY 90 tablet 1   • meloxicam (MOBIC) 7.5 MG tablet Take 1 tablet by mouth Daily As Needed for Mild Pain . Take with food 30 tablet 6   • Multiple Vitamins-Minerals (MULTIVITAMIN WITH MINERALS) tablet tablet Take 1 tablet by mouth Daily.     •  phenazopyridine (Pyridium) 200 MG tablet Take 1 tablet by mouth 3 (Three) Times a Day As Needed for Bladder Spasms. 30 tablet 0   • Probiotic Product (SOLUBLE FIBER/PROBIOTICS) chewable tablet Chew 1 each Daily.     • esomeprazole (nexIUM) 40 MG capsule Take 1 capsule by mouth Every Morning Before Breakfast. 90 capsule 1   • amoxicillin-clavulanate (Augmentin) 875-125 MG per tablet Take 1 tablet by mouth 2 (Two) Times a Day. 14 tablet 0   • nitrofurantoin (MACRODANTIN) 100 MG capsule Take 100 mg by mouth 4 (Four) Times a Day.       No facility-administered medications prior to visit.       No opioid medication identified on active medication list. I have reviewed chart for other potential  high risk medication/s and harmful drug interactions in the elderly.          Aspirin is on active medication list. Aspirin use is indicated based on review of current medical condition/s. Pros and cons of this therapy have been discussed today. Benefits of this medication outweigh potential harm.  Patient has been encouraged to continue taking this medication.  .      Patient Active Problem List   Diagnosis   • Gastroesophageal reflux disease   • Menopausal syndrome   • Osteoarthritis of cervical spine   • Carotid atherosclerosis   • Prediabetes   • Low back pain   • Insomnia   • Hypothyroidism   • Essential hypertension   • Hyperlipidemia   • Allergic rhinitis   • Palpitations   • Anxiety   • PVC's (premature ventricular contractions)   • Vitamin D deficiency   • Lung nodule   • Hyperglycemia   • Occipital pain   • Malaise and fatigue   • Acute nonintractable headache   • Elevated hematocrit   • Posterior subcapsular age-related cataract of both eyes   • Dry eye syndrome of bilateral lacrimal glands   • Hearing loss   • Nausea   • Elevated hemoglobin (HCC)     Advance Care Planning  Advance Directive is not on file.  ACP discussion was held with the patient during this visit. Patient does not have an advance directive, declines  "further assistance.          Objective    Vitals:    09/15/22 1111   BP: 142/78   Pulse: 99   Temp: 97.1 °F (36.2 °C)   SpO2: 98%   Weight: 75.3 kg (166 lb)   Height: 157.5 cm (62.01\")     Estimated body mass index is 30.35 kg/m² as calculated from the following:    Height as of this encounter: 157.5 cm (62.01\").    Weight as of this encounter: 75.3 kg (166 lb).    BMI is >= 30 and <35. (Class 1 Obesity). The following options were offered after discussion;: exercise counseling/recommendations and nutrition counseling/recommendations      Does the patient have evidence of cognitive impairment? Yes  Forgetting names but no difficulty with ADLS.  Physical Exam            HEALTH RISK ASSESSMENT    Smoking Status:  Social History     Tobacco Use   Smoking Status Former Smoker   • Packs/day: 0.25   • Years: 2.00   • Pack years: 0.50   • Quit date: 10/10/1994   • Years since quittin.9   Smokeless Tobacco Never Used     Alcohol Consumption:  Social History     Substance and Sexual Activity   Alcohol Use Yes   • Alcohol/week: 1.0 standard drink   • Types: 1 Glasses of wine per week    Comment: RARE///CAFFEINE USE: 1 CUP DAILY.      Fall Risk Screen:    QUETA Fall Risk Assessment was completed, and patient is at LOW risk for falls.Assessment completed on:9/15/2022    Depression Screening:  PHQ-2/PHQ-9 Depression Screening 9/15/2022   Retired PHQ-9 Total Score -   Retired Total Score -   Little Interest or Pleasure in Doing Things 0-->not at all   Feeling Down, Depressed or Hopeless 0-->not at all   PHQ-9: Brief Depression Severity Measure Score 0       Health Habits and Functional and Cognitive Screening:  Functional & Cognitive Status 9/15/2022   Do you have difficulty preparing food and eating? No   Do you have difficulty bathing yourself, getting dressed or grooming yourself? No   Do you have difficulty using the toilet? No   Do you have difficulty moving around from place to place? No   Do you have trouble with " steps or getting out of a bed or a chair? No   Current Diet Well Balanced Diet   Dental Exam Not up to date   Eye Exam Up to date   Exercise (times per week) 0 times per week   Current Exercises Include No Regular Exercise   Current Exercise Activities Include -   Do you need help using the phone?  No   Are you deaf or do you have serious difficulty hearing?  No   Do you need help with transportation? No   Do you need help shopping? No   Do you need help preparing meals?  No   Do you need help with housework?  No   Do you need help with laundry? No   Do you need help taking your medications? No   Do you need help managing money? No   Do you ever drive or ride in a car without wearing a seat belt? No   Have you felt unusual stress, anger or loneliness in the last month? -   Who do you live with? -   If you need help, do you have trouble finding someone available to you? -   Have you been bothered in the last four weeks by sexual problems? -   Do you have difficulty concentrating, remembering or making decisions? -       Age-appropriate Screening Schedule:  Refer to the list below for future screening recommendations based on patient's age, sex and/or medical conditions. Orders for these recommended tests are listed in the plan section. The patient has been provided with a written plan.    Health Maintenance   Topic Date Due   • TDAP/TD VACCINES (1 - Tdap) Never done   • MAMMOGRAM  04/13/2022   • ZOSTER VACCINE (1 of 2) 10/13/2022 (Originally 11/27/1985)   • INFLUENZA VACCINE  10/01/2022   • LIPID PANEL  10/28/2022   • DXA SCAN  10/11/2023              Assessment & Plan   CMS Preventative Services Quick Reference  Risk Factors Identified During Encounter  Immunizations Discussed/Encouraged (specific Immunizations; Tdap, Shingrix and COVID19  Inactivity/Sedentary  Obesity/Overweight   The above risks/problems have been discussed with the patient.  Follow up actions/plans if indicated are seen below in the  Assessment/Plan Section.  Pertinent information has been shared with the patient in the After Visit Summary.    Diagnoses and all orders for this visit:    1. Medicare annual wellness visit, subsequent (Primary)    2. Essential hypertension    3. Anxiety    4. Gastroesophageal reflux disease, unspecified whether esophagitis present    5. Frequent UTI    Other orders  -     omeprazole (priLOSEC) 40 MG capsule; Take 1 capsule by mouth Daily.  Dispense: 90 capsule; Refill: 1        Follow Up:   No follow-ups on file.     An After Visit Summary and PPPS were made available to the patient.          I spent 29 minutes caring for Isabel on this date of service. This time includes time spent by me in the following activities:preparing for the visit, reviewing tests, performing a medically appropriate examination and/or evaluation , counseling and educating the patient/family/caregiver, documenting information in the medical record and independently interpreting results and communicating that information with the patient/family/caregiver

## 2022-09-15 NOTE — PROGRESS NOTES
"Subjective   Isabel Handy is a 86 y.o. female.      History of Present Illness   The patient is here today to F/U on lab work.     At last visit lexapro started. She took a few and she felt weird. Like her heart was fluttering. She stopped it. She feels she needs something still. \"I dont sleep well.\"   GERD- nexium increased to 40 mg, its too expensive, it is helping. Chest pain and pressure is now gone.   UTI- frequent, symptoms are better, estrace seems to be helping, currently feeling some burning  The following portions of the patient's history were reviewed and updated as appropriate: allergies, current medications, past family history, past medical history, past social history, past surgical history and problem list.    Review of Systems   Constitutional: Negative for chills and fever.   Respiratory: Negative.    Cardiovascular: Negative.    Genitourinary: Positive for dysuria. Negative for frequency, hematuria and urgency.   Musculoskeletal: Positive for back pain.   Psychiatric/Behavioral: Negative for dysphoric mood and suicidal ideas. The patient is not nervous/anxious.        Objective   Physical Exam  Constitutional:       Appearance: Normal appearance. She is well-developed.   Neck:      Thyroid: No thyromegaly.   Cardiovascular:      Rate and Rhythm: Normal rate and regular rhythm.      Heart sounds: Normal heart sounds.   Pulmonary:      Effort: Pulmonary effort is normal.      Breath sounds: Normal breath sounds.   Musculoskeletal:      Cervical back: Normal range of motion and neck supple.   Lymphadenopathy:      Cervical: No cervical adenopathy.   Skin:     General: Skin is warm and dry.   Neurological:      Mental Status: She is alert.   Psychiatric:         Behavior: Behavior normal.         Thought Content: Thought content normal.         Judgment: Judgment normal.         Vitals:    09/15/22 1111   BP: 142/78   Pulse: 99   Temp: 97.1 °F (36.2 °C)   SpO2: 98%     Body mass index is 30.35 " kg/m².      Assessment & Plan   There are no diagnoses linked to this encounter.           1. HTN- High today, pt is anxious, she will monitor at home and call for syst >140 or diast>90  2. Anxiety- will try zoloft 25 mg daily, if this does not work well for her will try buspar  3. GERD- nexium is too expensive, can try omeprazole  4. UTI- check UA and culture, will send in antibiotic in case of worsening symptoms over the weekend    She can take 1000 IU D3, not osteopenic but due for DEXA.  Will have to re draw labs today as not all were done.

## 2022-09-20 DIAGNOSIS — D72.829 LEUKOCYTOSIS, UNSPECIFIED TYPE: Primary | ICD-10-CM

## 2022-09-21 LAB
BACTERIA UR CULT: ABNORMAL
BACTERIA UR CULT: ABNORMAL
OTHER ANTIBIOTIC SUSC ISLT: ABNORMAL

## 2022-09-21 RX ORDER — CIPROFLOXACIN 250 MG/1
250 TABLET, FILM COATED ORAL 2 TIMES DAILY
Qty: 10 TABLET | Refills: 0 | Status: SHIPPED | OUTPATIENT
Start: 2022-09-21 | End: 2022-09-26

## 2022-10-05 ENCOUNTER — OFFICE (OUTPATIENT)
Dept: URBAN - METROPOLITAN AREA CLINIC 66 | Facility: CLINIC | Age: 87
End: 2022-10-05

## 2022-10-05 VITALS
HEART RATE: 71 BPM | WEIGHT: 166 LBS | SYSTOLIC BLOOD PRESSURE: 145 MMHG | DIASTOLIC BLOOD PRESSURE: 75 MMHG | HEIGHT: 63 IN

## 2022-10-05 DIAGNOSIS — K21.9 GASTRO-ESOPHAGEAL REFLUX DISEASE WITHOUT ESOPHAGITIS: ICD-10-CM

## 2022-10-05 PROCEDURE — 99202 OFFICE O/P NEW SF 15 MIN: CPT | Performed by: NURSE PRACTITIONER

## 2022-10-11 ENCOUNTER — LAB (OUTPATIENT)
Dept: LAB | Facility: HOSPITAL | Age: 87
End: 2022-10-11

## 2022-10-11 ENCOUNTER — OFFICE VISIT (OUTPATIENT)
Dept: INTERNAL MEDICINE | Facility: CLINIC | Age: 87
End: 2022-10-11

## 2022-10-11 VITALS
OXYGEN SATURATION: 97 % | HEART RATE: 86 BPM | SYSTOLIC BLOOD PRESSURE: 128 MMHG | WEIGHT: 166 LBS | BODY MASS INDEX: 30.55 KG/M2 | HEIGHT: 62 IN | DIASTOLIC BLOOD PRESSURE: 66 MMHG

## 2022-10-11 DIAGNOSIS — Z23 NEED FOR INFLUENZA VACCINATION: ICD-10-CM
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
## 2022-10-11 DIAGNOSIS — R68.83 CHILLS: Primary | ICD-10-CM

## 2022-10-11 DIAGNOSIS — S02.40CA CLOSED FRACTURE OF RIGHT SIDE OF MAXILLA, INITIAL ENCOUNTER: ICD-10-CM

## 2022-10-11 DIAGNOSIS — M25.562 ACUTE PAIN OF LEFT KNEE: ICD-10-CM

## 2022-10-11 PROBLEM — H91.90 HARD OF HEARING: Status: ACTIVE | Noted: 2022-10-11

## 2022-10-11 PROCEDURE — 85025 COMPLETE CBC W/AUTO DIFF WBC: CPT | Performed by: NURSE PRACTITIONER

## 2022-10-11 PROCEDURE — 99214 OFFICE O/P EST MOD 30 MIN: CPT | Performed by: NURSE PRACTITIONER

## 2022-10-11 PROCEDURE — G0008 ADMIN INFLUENZA VIRUS VAC: HCPCS | Performed by: NURSE PRACTITIONER

## 2022-10-11 PROCEDURE — 90686 IIV4 VACC NO PRSV 0.5 ML IM: CPT | Performed by: NURSE PRACTITIONER

## 2022-10-11 RX ORDER — HYDROCODONE BITARTRATE AND ACETAMINOPHEN 5; 325 MG/1; MG/1
TABLET ORAL
COMMUNITY
Start: 2022-10-10 | End: 2022-10-13

## 2022-10-11 RX ORDER — PREDNISONE 20 MG/1
TABLET ORAL
Qty: 18 TABLET | Refills: 0 | Status: SHIPPED | OUTPATIENT
Start: 2022-10-11 | End: 2022-11-17 | Stop reason: ALTCHOICE

## 2022-10-11 RX ORDER — ONDANSETRON 4 MG/1
TABLET, ORALLY DISINTEGRATING ORAL
COMMUNITY
Start: 2022-10-10 | End: 2022-11-17 | Stop reason: ALTCHOICE

## 2022-10-11 RX ORDER — ESOMEPRAZOLE MAGNESIUM 40 MG/1
40 CAPSULE, DELAYED RELEASE ORAL
COMMUNITY
Start: 2022-09-29 | End: 2023-02-20

## 2022-10-11 RX ORDER — ACETAMINOPHEN AND CODEINE PHOSPHATE 300; 30 MG/1; MG/1
1-2 TABLET ORAL EVERY 6 HOURS PRN
COMMUNITY
Start: 2022-10-09 | End: 2022-11-17 | Stop reason: ALTCHOICE

## 2022-10-11 RX ORDER — ANORECTAL OINTMENT 15.7; .44; 24; 20.6 G/100G; G/100G; G/100G; G/100G
OINTMENT TOPICAL
COMMUNITY
Start: 2022-09-16

## 2022-10-11 NOTE — PROGRESS NOTES
Subjective   Isabel Handy is a 86 y.o. female.     History of Present Illness    The patient is here today with c/o recent fall.  She fell tripping over a windshield washer handle at a gas station. She fell straight flat down.   An ambulance was called and she was taken to [facility] 10/8. She was not admitted.   CT of head showed right periorbital soft tissue hematoma. CT of facial bones, mildly comminuted impacted fracture of the anterolateral right maxillary sinus wall. Right hip mild mod deg changes.   No lab work was done.   Went home, saturday night was ok, she had a pain pill.   When she got up Sunday she began to limp, Monday morning the pain was worse. She went to ER for the left knee X-ray shows neg fx, chondrocalcinosis. She is taking her pain medication but it really still hurts a lot and the pain has been severe enough for her to want to go to the ER again last night.     Recent UTI, symptoms have resolved.     DIL went home with patient.   The following portions of the patient's history were reviewed and updated as appropriate: allergies, current medications, past family history, past medical history, past social history, past surgical history and problem list.    Review of Systems   Constitutional: Positive for chills (saturday night).   Genitourinary: Negative.    Musculoskeletal: Positive for arthralgias.       Objective   Physical Exam  Constitutional:       Appearance: Normal appearance. She is well-developed.   Neck:      Thyroid: No thyromegaly.   Cardiovascular:      Rate and Rhythm: Normal rate and regular rhythm.      Heart sounds: Normal heart sounds.   Pulmonary:      Effort: Pulmonary effort is normal.      Breath sounds: Normal breath sounds.   Musculoskeletal:      Cervical back: Normal range of motion and neck supple.      Right knee: Ecchymosis present.      Left knee: Swelling, effusion, erythema, ecchymosis and bony tenderness present. Decreased range of motion. Tenderness  present over the medial joint line and lateral joint line. No patellar tendon tenderness. Normal alignment.      Comments: Unable to move even slightly so full evaluation was not possible.    Lymphadenopathy:      Cervical: No cervical adenopathy.   Skin:     General: Skin is warm and dry.      Findings: Ecchymosis, signs of injury and laceration present.             Comments: Laceration above right eye brow, c,d&i  Right side of nose near eye, scab, c,d&i  Tiny scab right top lip c,d,&i   Neurological:      Mental Status: She is alert.   Psychiatric:         Behavior: Behavior normal.         Thought Content: Thought content normal.         Judgment: Judgment normal.         Vitals:    10/11/22 1318   BP: 128/66   Pulse: 86   SpO2: 97%     Body mass index is 30.35 kg/m².      Assessment & Plan   Diagnoses and all orders for this visit:    1. Chills (Primary)    2. Closed fracture of right side of maxilla, initial encounter ([facility])  -     Ambulatory Referral to ENT (Otolaryngology)    3. Acute pain of left knee  -     Cancel: Ambulatory Referral to Orthopedic Surgery  -     predniSONE (DELTASONE) 20 MG tablet; Take 3 tabs for 3 days, 2 tabs for 3 days and then 1 tab for 3 days.  Dispense: 18 tablet; Refill: 0  -     Ambulatory Referral to Orthopedic Surgery    4. Fall, initial encounter  -     Cancel: Ambulatory Referral to Orthopedic Surgery    5. Need for influenza vaccination  -     FluLaval/Fluarix/Fluzone >6 Months                 1. Chills- check CBC, has not had re occurrence of symptoms, UA symptoms have improved, if able check UA  2. Facial fx- will place referral to ENT  3. Left knee pain- needs ortho eval and will likely need MRI, will start pt on prednisone as she has a lot of pain, swelling and redness

## 2022-10-12 ENCOUNTER — CLINICAL SUPPORT (OUTPATIENT)
Dept: INTERNAL MEDICINE | Facility: CLINIC | Age: 87
End: 2022-10-12
Payer: MEDICARE

## 2022-10-12 ENCOUNTER — TELEPHONE (OUTPATIENT)
Dept: INTERNAL MEDICINE | Facility: CLINIC | Age: 87
End: 2022-10-12

## 2022-10-12 ENCOUNTER — OFFICE VISIT (OUTPATIENT)
Dept: ORTHOPEDIC SURGERY | Facility: CLINIC | Age: 87
End: 2022-10-12

## 2022-10-12 VITALS — BODY MASS INDEX: 30.55 KG/M2 | HEIGHT: 62 IN | TEMPERATURE: 96.1 F | WEIGHT: 166 LBS

## 2022-10-12 DIAGNOSIS — S80.02XA CONTUSION OF LEFT KNEE, INITIAL ENCOUNTER: ICD-10-CM
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
## 2022-10-12 DIAGNOSIS — R52 PAIN: Primary | ICD-10-CM

## 2022-10-12 DIAGNOSIS — M11.262 CHONDROCALCINOSIS OF LEFT KNEE: ICD-10-CM

## 2022-10-12 DIAGNOSIS — M17.12 ARTHRITIS OF LEFT KNEE: ICD-10-CM

## 2022-10-12 DIAGNOSIS — D72.829 LEUKOCYTOSIS, UNSPECIFIED TYPE: Primary | ICD-10-CM

## 2022-10-12 PROCEDURE — 73562 X-RAY EXAM OF KNEE 3: CPT | Performed by: ORTHOPAEDIC SURGERY

## 2022-10-12 PROCEDURE — 99204 OFFICE O/P NEW MOD 45 MIN: CPT | Performed by: ORTHOPAEDIC SURGERY

## 2022-10-12 NOTE — PROGRESS NOTES
Patient Name: Isabel Handy   YOB: 1935  Referring Primary Care Physician: Beth Figueredo APRN  BMI: Body mass index is 30.36 kg/m².    Chief Complaint:    Chief Complaint   Patient presents with   • Left Knee - Follow-up        HPI:     Isabel Handy is a 86 y.o. female who presents today for evaluation of   Chief Complaint   Patient presents with   • Left Knee - Follow-up   .  Patient is seen today complaining of acute left knee pain.  Says she really did not have trouble with it before but about 5 days ago she was in a gas station she pulled off the rubber gloves she was using to fill the tank it blew in the when she bent over and sounds like something was sticking out and caught her made her fall.  It may have been the [employer] washing handle.  She had immediate pain and she landed on her face and also noticed that her left knee hurt she said actually in the last 24 hours her knee is feeling a bit better she was seen the emergency department placed a knee immobilizer which was clumsy for her to wear.  Saw primary care yesterday put her on prednisone and referred her here.  She is actually talking to her primary care we go in was able to show her her labs they were describing this as she appears to have urinary tract infection she has trouble with that and they were going to change her prescriptions around.  As far as her knees go it is doing better so she had trouble walking on the first few days she reports no groin or hip pain      Subjective   Medications:   Home Medications:  Current Outpatient Medications on File Prior to Visit   Medication Sig   • acetaminophen-codeine (TYLENOL #3) 300-30 MG per tablet Take 1-2 tablets by mouth Every 6 (Six) Hours As Needed. for pain   • amitriptyline (ELAVIL) 25 MG tablet TAKE 1 TABLET BY MOUTH EVERY NIGHT   • aspirin (aspirin) 81 MG EC tablet Take 1 tablet by mouth Daily.   • atorvastatin (LIPITOR) 20 MG tablet TAKE 1 TABLET BY MOUTH EVERY NIGHT    • Calmoseptine 0.44-20.6 % ointment APPLY AS DIRECTED EVERY EVENING   • esomeprazole (nexIUM) 40 MG capsule Take 1 capsule by mouth Every Morning Before Breakfast.   • estradiol (ESTRACE VAGINAL) 0.1 MG/GM vaginal cream Insert 2 g into the vagina 2 (Two) Times a Week.   • HYDROcodone-acetaminophen (NORCO) 5-325 MG per tablet   1 Tab, Oral, Tab, Q6H, PRN for pain, X 3 Day(s), # 12 Tab, 0 Refill(s), Pharmacy: [id number], cm, 10/10/22 9:21:00 EDT, CLINICALHEIGHT, 75, kg, 10/10/22 9:21:00 EDT, CLINICALWEIGHT, 160.02   • levothyroxine (SYNTHROID, LEVOTHROID) 75 MCG tablet TAKE 1 TABLET BY MOUTH DAILY   • lisinopril (PRINIVIL,ZESTRIL) 20 MG tablet TAKE 1 TABLET BY MOUTH DAILY   • meloxicam (MOBIC) 7.5 MG tablet Take 1 tablet by mouth Daily As Needed for Mild Pain . Take with food   • Multiple Vitamins-Minerals (MULTIVITAMIN WITH MINERALS) tablet tablet Take 1 tablet by mouth Daily.   • omeprazole (priLOSEC) 40 MG capsule Take 1 capsule by mouth Daily.   • ondansetron ODT (ZOFRAN-ODT) 4 MG disintegrating tablet    • phenazopyridine (Pyridium) 200 MG tablet Take 1 tablet by mouth 3 (Three) Times a Day As Needed for Bladder Spasms.   • predniSONE (DELTASONE) 20 MG tablet Take 3 tabs for 3 days, 2 tabs for 3 days and then 1 tab for 3 days.   • Probiotic Product (SOLUBLE FIBER/PROBIOTICS) chewable tablet Chew 1 each Daily.   • sertraline (Zoloft) 25 MG tablet Take 1 tablet by mouth Daily.   • [DISCONTINUED] escitalopram (Lexapro) 10 MG tablet Take 1 tablet by mouth Daily.     No current facility-administered medications on file prior to visit.     Current Medications:  Scheduled Meds:  Continuous Infusions:No current facility-administered medications for this visit.    PRN Meds:.    I have reviewed the patient's medical history in detail and updated the computerized patient record.  Review and summarization of old records includes:    Past Medical History:   Diagnosis Date   • Arrhythmia    • Arthritis    •  GERD (gastroesophageal reflux disease)    • History of pneumonia    • Hyperlipidemia    • Hypertension    • Hypothyroidism    • Skin cancer    • Subarachnoid hemorrhage (HCC)    • Vasodepressor syncope         Past Surgical History:   Procedure Laterality Date   • APPENDECTOMY N/A    • BLEPHAROPLASTY Bilateral    • BREAST CYST EXCISION      to remove cysts   • BREAST IMPLANT SURGERY Bilateral 2002    Dr. Marissa Cobos, [facility]   • BUNIONECTOMY Bilateral    • CATARACT EXTRACTION Bilateral    • COLONOSCOPY N/A 2018    Procedure: COLONOSCOPY into cecum and T.I. with cold biopsy polypectomy;  Surgeon: Roly Be MD;  Location: [facility];  Service: Gastroenterology   • CORRECTION HAMMER TOE Left 2013    Revision correction through an altered surgical field of the left second hammertoe, correciton of the left third hammertoe, DuVries plantar condylectomy of the left second metatarsal, Plantar DuVries condylectomy of the left third metatarsal, partial excision of the left fifth metatarsal head for alleviation of bunionette deformity-Dr. Jordan Montilla, [facility]   • ENDOSCOPY N/A 2018    Procedure: ESOPHAGOGASTRODUODENOSCOPY with esophageal dilation #54 Castellanos;  Surgeon: Roly Be MD;  Location: [facility];  Service: Gastroenterology   • ENDOSCOPY AND COLONOSCOPY N/A 2010    Hiatal hernia, chronic gastritis: biopsied, normal examined duodenum, normal examined ileum, mild diverticulosis sigmoid colon-Dr. Roly Be, [facility]   • TOTAL ABDOMINAL HYSTERECTOMY Bilateral         Social History     Occupational History     Employer: RETIRED   Tobacco Use   • Smoking status: Former     Packs/day: 0.25     Years: 2.00     Pack years: 0.50     Types: Cigarettes     Quit date: 10/10/1994     Years since quittin.0   • Smokeless tobacco: Never   Vaping Use   • Vaping Use: Never used   Substance and Sexual Activity   • Alcohol use: Yes     Alcohol/week: 1.0 standard drink      Types: 1 Glasses of wine per week     Comment: RARE///CAFFEINE USE: 1 CUP DAILY.    • Drug use: No   • Sexual activity: Defer      Social History     Social History Narrative   • Not on file        Family History   Problem Relation Age of Onset   • Hypertension Mother    • Heart disease Mother    • Arthritis Mother    • Colon cancer Mother         Colon   • Colon polyps Mother    • Heart attack Mother    • Hypertension Father    • Heart disease Father    • Prostate cancer Father    • Heart attack Father    • COPD Sister    • Hypertension Brother    • Heart disease Brother          of heart attack (2 brothers)   • Tuberculosis Brother    • Diabetes Paternal Aunt    • Heart disease Brother        ROS: 14 point review of systems was performed and all other systems were reviewed and are negative except for documented findings in HPI and today's encounter.     Allergies:   Allergies   Allergen Reactions   • Gatifloxacin Arrhythmia   • Rosuvastatin Myalgia   • Bactrim [Sulfamethoxazole-Trimethoprim] Headache     Constitutional:  Denies fever, shaking or chills   Eyes:  Denies change in visual acuity   HENT:  Denies nasal congestion or sore throat   Respiratory:  Denies cough or shortness of breath   Cardiovascular:  Denies chest pain or severe LE edema   GI:  Denies abdominal pain, nausea, vomiting, bloody stools or diarrhea   Musculoskeletal:  Numbness, tingling, pain, or loss of motor function only as noted above in history of present illness.  : Denies painful urination or hematuria  Integument:  Denies rash, lesion or ulceration   Neurologic:  Denies headache or focal weakness  Endocrine:  Denies lymphadenopathy  Psych:  Denies confusion or change in mental status   Hem:  Denies active bleeding    OBJECTIVE:  Physical Exam: 86 y.o. female  Wt Readings from Last 3 Encounters:   10/12/22 75.3 kg (166 lb)   10/11/22 75.3 kg (166 lb)   09/15/22 75.3 kg (166 lb)     Ht Readings from Last 1 Encounters:   10/12/22  "157.5 cm (62\")     Body mass index is 30.36 kg/m².  Vitals:    10/12/22 0929   Temp: 96.1 °F (35.6 °C)     Vital signs reviewed.     General Appearance:    Alert, cooperative, in no acute distress                  Eyes: conjunctiva clear  ENT: external ears and nose atraumatic  CV: no peripheral edema  Resp: normal respiratory effort  Skin: no rashes or wounds; normal turgor  Psych: mood and affect appropriate  Lymph: no nodes appreciated  Neuro: gross sensation intact  Vascular:  Palpable peripheral pulse in noted extremity  Musculoskeletal Extremities: Exam today shows pleasant lady she has bruising over the right side of her face her left knee has bruising along the anterior portion of the bursa does not feel particularly full gross varus valgus does not cause pain no real pain palpating outside the bruised portions and she can maintain a leg raise.  No obvious defect in the quad or patellar tendon.  You can axially load and rotate her hip without increase in pain    Radiology:   AP lateral 40 degree PA x-ray left knee taken the office today for complaints of pain without comparison views available show advanced arthritic change with chondrocalcinosis in both knees tricompartmentally        Assessment:     ICD-10-CM ICD-9-CM   1. Pain  R52 780.96   2. Arthritis of left knee  M17.12 716.96   3. Chondrocalcinosis of left knee  M11.262 275.49     712.36   4. Accidental fall, initial encounter  W19.XXXA E888.9   5. Contusion of left knee, initial encounter  S80.02XA 924.11        MDM/Plan:   The diagnosis(es), natural history, pathophysiology and treatment for diagnosis(es) were discussed. Opportunity given and questions answered.  Biomechanics of pertinent body areas discussed.  When appropriate, the use of ambulatory aids discussed.    Biomechanics of pertinent body areas discussed.  When appropriate, the use of ambulatory aids discussed.  MEDICATIONS:  The risks, benefits, warnings,side effects and alternatives of " medications discussed.  Inflammation/pain control; with cold, heat, elevation and/or liniments discussed as appropriate  MEDICAL RECORDS reviewed from other provider(s) for past and current medical history pertinent to this complaint.  I believe she has contusions with underlying arthritis I do not see big effusion etc.  Hopefully she will get better with the prednisone her primary care placed her on told her to use Tylenol as well but she will continue to ice it she has a Rollator and a regular walker and have her do that.  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  Hopefully she will get better if she still has significant problems in a month see her back with new x-rays.  Explained that she could possibly have occult injuries etc. but she is getting better    10/12/2022    Dictated utilizing Dragon dictation

## 2022-10-12 NOTE — TELEPHONE ENCOUNTER
Update:pt returned call and was informed while in 's office      Called pt, LVM to return call to be informed.        ----- Message from ED Mcgee sent at 10/11/2022  5:18 PM EDT -----  WBC remains elevated, could be the stress from the fall. Let ortho know this when she is seen tomorrow. In addition would like to collect UA. Notify for s/s of infection like fever and chills.

## 2022-10-15 LAB
APPEARANCE UR: ABNORMAL
BACTERIA #/AREA URNS HPF: ABNORMAL /HPF
BACTERIA UR CULT: ABNORMAL
BACTERIA UR CULT: ABNORMAL
BILIRUB UR QL STRIP: NEGATIVE
CASTS URNS QL MICRO: ABNORMAL /LPF
COLOR UR: YELLOW
EPI CELLS #/AREA URNS HPF: >10 /HPF (ref 0–10)
GLUCOSE UR QL STRIP: NEGATIVE
HGB UR QL STRIP: NEGATIVE
KETONES UR QL STRIP: ABNORMAL
LEUKOCYTE ESTERASE UR QL STRIP: ABNORMAL
MICRO URNS: ABNORMAL
NITRITE UR QL STRIP: NEGATIVE
OTHER ANTIBIOTIC SUSC ISLT: ABNORMAL
PH UR STRIP: 6 [PH] (ref 5–7.5)
PROT UR QL STRIP: NEGATIVE
RBC #/AREA URNS HPF: ABNORMAL /HPF (ref 0–2)
SP GR UR STRIP: 1.01 (ref 1–1.03)
URINALYSIS REFLEX: ABNORMAL
UROBILINOGEN UR STRIP-MCNC: 0.2 MG/DL (ref 0.2–1)
WBC #/AREA URNS HPF: >30 /HPF (ref 0–5)

## 2022-10-19 RX ORDER — CIPROFLOXACIN 250 MG/1
250 TABLET, FILM COATED ORAL 2 TIMES DAILY
Qty: 6 TABLET | Refills: 0 | Status: SHIPPED | OUTPATIENT
Start: 2022-10-19 | End: 2022-10-22

## 2022-10-27 ENCOUNTER — HOSPITAL ENCOUNTER (OUTPATIENT)
Dept: PET IMAGING | Facility: HOSPITAL | Age: 87
Discharge: HOME | End: 2022-10-27
Admitting: NURSE PRACTITIONER

## 2022-10-27 DIAGNOSIS — Z78.0 POST-MENOPAUSE: ICD-10-CM

## 2022-10-27 PROCEDURE — 77080 DXA BONE DENSITY AXIAL: CPT

## 2022-10-27 PROCEDURE — 77080 DXA BONE DENSITY AXIAL: CPT | Performed by: RADIOLOGY

## 2022-11-10 DIAGNOSIS — I10 ESSENTIAL HYPERTENSION: ICD-10-CM

## 2022-11-10 RX ORDER — ATORVASTATIN CALCIUM 20 MG/1
20 TABLET, FILM COATED ORAL NIGHTLY
Qty: 90 TABLET | Refills: 1 | Status: SHIPPED | OUTPATIENT
Start: 2022-11-10 | End: 2023-02-20

## 2022-11-10 RX ORDER — LISINOPRIL 20 MG/1
20 TABLET ORAL DAILY
Qty: 90 TABLET | Refills: 1 | Status: SHIPPED | OUTPATIENT
Start: 2022-11-10 | End: 2023-02-20

## 2022-11-16 ENCOUNTER — FLU SHOT (OUTPATIENT)
Dept: INTERNAL MEDICINE | Facility: CLINIC | Age: 87
End: 2022-11-16

## 2022-11-16 PROCEDURE — 90662 IIV NO PRSV INCREASED AG IM: CPT | Performed by: NURSE PRACTITIONER

## 2022-11-16 PROCEDURE — G0008 ADMIN INFLUENZA VIRUS VAC: HCPCS | Performed by: NURSE PRACTITIONER

## 2022-11-17 ENCOUNTER — OFFICE VISIT (OUTPATIENT)
Dept: CARDIOLOGY | Facility: CLINIC | Age: 87
End: 2022-11-17

## 2022-11-17 VITALS
DIASTOLIC BLOOD PRESSURE: 74 MMHG | HEIGHT: 62 IN | RESPIRATION RATE: 16 BRPM | SYSTOLIC BLOOD PRESSURE: 132 MMHG | OXYGEN SATURATION: 90 % | HEART RATE: 69 BPM | BODY MASS INDEX: 30.18 KG/M2 | WEIGHT: 164 LBS

## 2022-11-17 DIAGNOSIS — E78.5 HYPERLIPIDEMIA, UNSPECIFIED HYPERLIPIDEMIA TYPE: ICD-10-CM

## 2022-11-17 DIAGNOSIS — I49.3 PVC'S (PREMATURE VENTRICULAR CONTRACTIONS): ICD-10-CM

## 2022-11-17 DIAGNOSIS — R00.2 PALPITATIONS: ICD-10-CM

## 2022-11-17 DIAGNOSIS — I65.29 CAROTID ATHEROSCLEROSIS, UNSPECIFIED LATERALITY: ICD-10-CM

## 2022-11-17 DIAGNOSIS — I10 ESSENTIAL HYPERTENSION: Primary | ICD-10-CM

## 2022-11-17 PROCEDURE — 93000 ELECTROCARDIOGRAM COMPLETE: CPT | Performed by: NURSE PRACTITIONER

## 2022-11-17 PROCEDURE — 99214 OFFICE O/P EST MOD 30 MIN: CPT | Performed by: NURSE PRACTITIONER

## 2022-11-17 NOTE — PROGRESS NOTES
Date of Office Visit: 2022  Encounter Provider: ED Walker  Place of Service: [facility]ARDIOLOG[facility]  Patient Name: Isabel Handy  :1935  Primary Cardiologist: Dr. Mcmahon    CC:  Annual cardiac evaluation    Dear Beth    HPI: Isabel Handy is a pleasant 86 y.o. female who presents 2022 for cardiac follow up.  Have reviewed her past medical records including notes, labs and testing in preparation for today's visit.  She is followed for PVCs.  She has history of hypertension, hyperlipidemia, history of esophageal strictures requiring dilation.     She woke with these on the morning of 3/15/2016.  She was sent to the chest pain center.  Her troponin was negative.  Her TSH was well-controlled.  They continued to cause issues for her as well some intermittent chest pain and pressure.  Because of her symptoms she was sent for dobutamine stress echocardiogram which was done 3/16/2016.  This showed no evidence of ischemia.  Her aortic valve had mild calcification on it.  Her baseline ejection fraction was 64%.  There was another valve disease.  She was sizes were normal.  He was a normal stress echocardiogram.  She also had a Holter recording done 3/15/2016.  This showed 100 PVCs per hour.  There were rare PACs.     She is a history of carotid stenosis diagnosed in the past.  She has thyroid disease which is normally control.  She is very strong family history of cardiac disease.  Both of her parents of myocardial infarctions and she's had 2 brothers with myocardial infarctions as well.     Echo on 2020 shows ejection fraction 66% with grade 1 diastolic dysfunction, aortic valve calcification without stenosis, mild tricuspid insufficiency.  Vascular screening, carotids only, done 2021 showed mild right carotid artery stenosis and plaque of the left carotid artery without stenosis.     Patient was getting gas and she tripped over the handle of a  "windshield wiper and fell \"flat on my face\".  She was taken to [facility] via ambulance on 10/8/2022.  CT of the head showed right periorbital soft tissue hematoma.  CT of facial bones, mildly comminuted impacted fracture of the anterior lateral right maxillary sinus wall.  Right hip mild to moderate degenerative changes.  No lab work was done.  She went home that same day and had a pain pill.  When she got up the next day, she began to limp and by Monday she was in worse pain.  She went to the ER for a left knee x-ray which showed negative fractures, chondrocalcinosis.  She did see Ortho in follow-up.  No further investigation was done.    Labs in 10/28/2021 show normal CMP, normal TSH, total cholesterol 206, HDL 99, LDL 89, triglycerides 106, globin 16.8, platelets 306, otherwise normal CBC.     She presents today for her annual cardiac evaluation.  She denies any palpitations, shortness of breath, dizziness or lightheadedness.  She has not had any syncope or presyncopal episodes.  She denies fatigue.  Her blood pressure is well controlled.  She states she continues to have some chronic mild lower extremity edema that is unchanged and resolves overnight..  She has a history of GERD that causes discomfort at times.  She had labs performed 915/2022 that showed a CMP with normal electrolytes and liver function test.  Her creatinine was 0.84.  TSH 1.090.  Lipid profile showed total cholesterol 189, HDL 88, LDL 80 and triglycerides 123.  CBC showed a WBC of 15.94 with normal H&H and a platelet count of 372.  Repeat CBC on 10/11/2022 showed WBC 14.48 with normal H&H and platelets.  She states she is pretty much recovered from her fall, she still has some mild bruising above her right eye.     Past Medical History:   Diagnosis Date   • Arrhythmia    • Arthritis    • GERD (gastroesophageal reflux disease)    • History of pneumonia    • Hyperlipidemia    • Hypertension    • Hypothyroidism    • Skin cancer    • " Subarachnoid hemorrhage (HCC)    • Vasodepressor syncope        Past Surgical History:   Procedure Laterality Date   • APPENDECTOMY N/A    • BLEPHAROPLASTY Bilateral    • BREAST CYST EXCISION      to remove cysts   • BREAST IMPLANT SURGERY Bilateral 2002    Dr. Marissa Cobos, [facility]   • BUNIONECTOMY Bilateral    • CATARACT EXTRACTION Bilateral    • COLONOSCOPY N/A 2018    Procedure: COLONOSCOPY into cecum and T.I. with cold biopsy polypectomy;  Surgeon: Roly Be MD;  Location: [facility] ENDOSCOPY;  Service: Gastroenterology   • CORRECTION HAMMER TOE Left 2013    Revision correction through an altered surgical field of the left second hammertoe, correciton of the left third hammertoe, DuVries plantar condylectomy of the left second metatarsal, Plantar DuVries condylectomy of the left third metatarsal, partial excision of the left fifth metatarsal head for alleviation of bunionette deformity-Dr. Jordan Montilla, [facility]   • ENDOSCOPY N/A 2018    Procedure: ESOPHAGOGASTRODUODENOSCOPY with esophageal dilation #54 Castellanos;  Surgeon: Roly Be MD;  Location: [facility] ENDOSCOPY;  Service: Gastroenterology   • ENDOSCOPY AND COLONOSCOPY N/A 2010    Hiatal hernia, chronic gastritis: biopsied, normal examined duodenum, normal examined ileum, mild diverticulosis sigmoid colon-Dr. Roly Be, [facility]   • TOTAL ABDOMINAL HYSTERECTOMY Bilateral        Social History     Socioeconomic History   • Marital status:    • Number of children: 3   Tobacco Use   • Smoking status: Former     Packs/day: 0.25     Years: 2.00     Pack years: 0.50     Types: Cigarettes     Quit date: 10/10/1994     Years since quittin.1   • Smokeless tobacco: Never   Vaping Use   • Vaping Use: Never used   Substance and Sexual Activity   • Alcohol use: Yes     Alcohol/week: 1.0 standard drink     Types: 1 Glasses of wine per week     Comment: RARE///CAFFEINE USE: 1 CUP DAILY.    • Drug use: No   • Sexual activity:  Defer       Family History   Problem Relation Age of Onset   • Hypertension Mother    • Heart disease Mother    • Arthritis Mother    • Colon cancer Mother         Colon   • Colon polyps Mother    • Heart attack Mother    • Hypertension Father    • Heart disease Father    • Prostate cancer Father    • Heart attack Father    • COPD Sister    • Hypertension Brother    • Heart disease Brother          of heart attack (2 brothers)   • Tuberculosis Brother    • Diabetes Paternal Aunt    • Heart disease Brother        The following portion of the patient's history were reviewed and updated as appropriate: past medical history, past surgical history, past social history, past family history, allergies, current medications, and problem list.    Review of Systems   Constitutional: Negative for diaphoresis, fever and malaise/fatigue.   HENT: Negative for congestion, hearing loss, hoarse voice, nosebleeds and sore throat.    Eyes: Negative for photophobia, vision loss in left eye, vision loss in right eye and visual disturbance.   Cardiovascular: Positive for leg swelling. Negative for chest pain, dyspnea on exertion, irregular heartbeat, near-syncope, orthopnea, palpitations, paroxysmal nocturnal dyspnea and syncope.   Respiratory: Negative for cough, hemoptysis, shortness of breath, sleep disturbances due to breathing, snoring, sputum production and wheezing.    Endocrine: Negative for cold intolerance, heat intolerance, polydipsia, polyphagia and polyuria.   Hematologic/Lymphatic: Negative for bleeding problem. Does not bruise/bleed easily.   Skin: Negative for color change, dry skin, poor wound healing, rash and suspicious lesions.   Musculoskeletal: Negative for arthritis, back pain, falls, gout, joint pain, joint swelling, muscle cramps, muscle weakness and myalgias.   Gastrointestinal: Negative for bloating, abdominal pain, constipation, diarrhea, dysphagia, melena, nausea and vomiting.   Neurological: Negative for  "excessive daytime sleepiness, dizziness, headaches, light-headedness, loss of balance, numbness, paresthesias, seizures, vertigo and weakness.   Psychiatric/Behavioral: Negative for depression, memory loss and substance abuse. The patient is not nervous/anxious.        Allergies   Allergen Reactions   • Gatifloxacin Arrhythmia   • Rosuvastatin Myalgia   • Bactrim [Sulfamethoxazole-Trimethoprim] Headache         Current Outpatient Medications:   •  amitriptyline (ELAVIL) 25 MG tablet, TAKE 1 TABLET BY MOUTH EVERY NIGHT, Disp: 90 tablet, Rfl: 1  •  aspirin (aspirin) 81 MG EC tablet, Take 1 tablet by mouth Daily., Disp: , Rfl:   •  atorvastatin (LIPITOR) 20 MG tablet, TAKE 1 TABLET BY MOUTH EVERY NIGHT, Disp: 90 tablet, Rfl: 1  •  Calmoseptine 0.44-20.6 % ointment, APPLY AS DIRECTED EVERY EVENING, Disp: , Rfl:   •  esomeprazole (nexIUM) 40 MG capsule, Take 1 capsule by mouth Every Morning Before Breakfast., Disp: , Rfl:   •  estradiol (ESTRACE VAGINAL) 0.1 MG/GM vaginal cream, Insert 2 g into the vagina 2 (Two) Times a Week., Disp: 42.5 g, Rfl: 7  •  levothyroxine (SYNTHROID, LEVOTHROID) 75 MCG tablet, TAKE 1 TABLET BY MOUTH DAILY, Disp: 90 tablet, Rfl: 1  •  lisinopril (PRINIVIL,ZESTRIL) 20 MG tablet, TAKE 1 TABLET BY MOUTH DAILY, Disp: 90 tablet, Rfl: 1  •  meloxicam (MOBIC) 7.5 MG tablet, Take 1 tablet by mouth Daily As Needed for Mild Pain . Take with food, Disp: 30 tablet, Rfl: 6  •  omeprazole (priLOSEC) 40 MG capsule, Take 1 capsule by mouth Daily., Disp: 90 capsule, Rfl: 1  •  phenazopyridine (Pyridium) 200 MG tablet, Take 1 tablet by mouth 3 (Three) Times a Day As Needed for Bladder Spasms., Disp: 30 tablet, Rfl: 0  •  Probiotic Product (SOLUBLE FIBER/PROBIOTICS) chewable tablet, Chew 1 each Daily., Disp: , Rfl:         Objective:     Vitals:    11/17/22 1249   BP: 132/74   Pulse: 69   Resp: 16   SpO2: 90%   Weight: 74.4 kg (164 lb)   Height: 157.5 cm (62\")     Body mass index is 30 kg/m².      Vitals " reviewed.   Constitutional:       General: Not in acute distress.     Appearance: Healthy appearance. Well-developed.   Eyes:      General:         Right eye: No discharge.         Left eye: No discharge.      Conjunctiva/sclera: Conjunctivae normal.   HENT:      Head: Normocephalic and atraumatic.      Right Ear: External ear normal.      Left Ear: External ear normal.      Nose: Nose normal.   Neck:      Thyroid: No thyromegaly.      Vascular: No JVD.      Trachea: No tracheal deviation.      Lymphadenopathy: No cervical adenopathy.   Pulmonary:      Effort: Pulmonary effort is normal. No respiratory distress.      Breath sounds: Normal breath sounds. No wheezing. No rales.   Chest:      Chest wall: Not tender to palpatation.   Cardiovascular:      Normal rate. Regular rhythm.      No gallop.   Pulses:     Intact distal pulses.   Edema:     Peripheral edema present.     Ankle: bilateral trace edema of the ankle.     Feet: bilateral trace edema of the feet.  Abdominal:      General: There is no distension.      Palpations: Abdomen is soft.      Tenderness: There is no abdominal tenderness.   Musculoskeletal: Normal range of motion.         General: No tenderness or deformity.      Cervical back: Normal range of motion and neck supple. Skin:     General: Skin is warm and dry.      Findings: No erythema or rash.   Neurological:      Mental Status: Alert and oriented to person, place, and time.      Coordination: Coordination normal.   Psychiatric:         Attention and Perception: Attention normal.         Behavior: Behavior normal.         Thought Content: Thought content normal.         Cognition and Memory: Cognition normal.         Judgment: Judgment normal.               ECG 12 Lead    Date/Time: 11/17/2022 1:01 PM  Performed by: Eve Degroot APRN  Authorized by: Eve Degroot APRN   Comparison: compared with previous ECG from 8/4/2022  Similar to previous ECG  Rhythm: sinus rhythm  Rate: normal  Conduction:  conduction normal  ST Segments: ST segments normal  T flattening: V2  QRS axis: left    Clinical impression: non-specific ECG              Assessment:       Diagnosis Plan   1. Essential hypertension        2. Hyperlipidemia, unspecified hyperlipidemia type        3. Palpitations        4. PVC's (premature ventricular contractions)        5. Carotid atherosclerosis, unspecified laterality               Plan:       1. PVCs.  None on EKG today.  2. left carotid bruit, no stenosis.    3. Family history cardiovascular disease.    4. Lower Extremity edema.  This is noncardiac.  She props her legs up whenever sitting, she states it pretty much resolves overnight.  5. Sedentary, encouraged exercise.   6. Elevated hemoglobin, has an appoint with hematology, stopped her estradiol due to this.  7. Aortic valve calcification causing murmur.    No changes, RTO in 1 year with RM     As always, it has been a pleasure to participate in your patient's care. Thank you.       Sincerely,       ED Walker      Current Outpatient Medications:   •  amitriptyline (ELAVIL) 25 MG tablet, TAKE 1 TABLET BY MOUTH EVERY NIGHT, Disp: 90 tablet, Rfl: 1  •  aspirin (aspirin) 81 MG EC tablet, Take 1 tablet by mouth Daily., Disp: , Rfl:   •  atorvastatin (LIPITOR) 20 MG tablet, TAKE 1 TABLET BY MOUTH EVERY NIGHT, Disp: 90 tablet, Rfl: 1  •  Calmoseptine 0.44-20.6 % ointment, APPLY AS DIRECTED EVERY EVENING, Disp: , Rfl:   •  esomeprazole (nexIUM) 40 MG capsule, Take 1 capsule by mouth Every Morning Before Breakfast., Disp: , Rfl:   •  estradiol (ESTRACE VAGINAL) 0.1 MG/GM vaginal cream, Insert 2 g into the vagina 2 (Two) Times a Week., Disp: 42.5 g, Rfl: 7  •  levothyroxine (SYNTHROID, LEVOTHROID) 75 MCG tablet, TAKE 1 TABLET BY MOUTH DAILY, Disp: 90 tablet, Rfl: 1  •  lisinopril (PRINIVIL,ZESTRIL) 20 MG tablet, TAKE 1 TABLET BY MOUTH DAILY, Disp: 90 tablet, Rfl: 1  •  meloxicam (MOBIC) 7.5 MG tablet, Take 1 tablet by mouth Daily As Needed for  Mild Pain . Take with food, Disp: 30 tablet, Rfl: 6  •  phenazopyridine (Pyridium) 200 MG tablet, Take 1 tablet by mouth 3 (Three) Times a Day As Needed for Bladder Spasms., Disp: 30 tablet, Rfl: 0  •  Probiotic Product (SOLUBLE FIBER/PROBIOTICS) chewable tablet, Chew 1 each Daily., Disp: , Rfl:       Dictated utilizing Dragon dictation

## 2022-12-30 DIAGNOSIS — E03.9 HYPOTHYROIDISM, UNSPECIFIED TYPE: ICD-10-CM

## 2022-12-30 RX ORDER — LEVOTHYROXINE SODIUM 0.07 MG/1
75 TABLET ORAL DAILY
Qty: 90 TABLET | Refills: 1 | OUTPATIENT
Start: 2022-12-30

## 2023-01-24 DIAGNOSIS — E03.9 HYPOTHYROIDISM, UNSPECIFIED TYPE: ICD-10-CM

## 2023-01-24 RX ORDER — LEVOTHYROXINE SODIUM 0.07 MG/1
75 TABLET ORAL DAILY
Qty: 90 TABLET | Refills: 1 | Status: SHIPPED | OUTPATIENT
Start: 2023-01-24

## 2023-02-20 DIAGNOSIS — I10 ESSENTIAL HYPERTENSION: ICD-10-CM

## 2023-02-20 RX ORDER — ATORVASTATIN CALCIUM 20 MG/1
20 TABLET, FILM COATED ORAL NIGHTLY
Qty: 90 TABLET | Refills: 1 | Status: SHIPPED | OUTPATIENT
Start: 2023-02-20

## 2023-02-20 RX ORDER — ESOMEPRAZOLE MAGNESIUM 40 MG/1
40 CAPSULE, DELAYED RELEASE ORAL
Qty: 90 CAPSULE | Refills: 0 | Status: SHIPPED | OUTPATIENT
Start: 2023-02-20

## 2023-02-20 RX ORDER — LISINOPRIL 20 MG/1
20 TABLET ORAL DAILY
Qty: 90 TABLET | Refills: 1 | Status: SHIPPED | OUTPATIENT
Start: 2023-02-20 | End: 2023-03-20 | Stop reason: SDUPTHER

## 2023-03-06 ENCOUNTER — TELEPHONE (OUTPATIENT)
Dept: ONCOLOGY | Facility: CLINIC | Age: 88
End: 2023-03-06

## 2023-03-06 NOTE — TELEPHONE ENCOUNTER
Caller: Isabel Handy    Relationship to patient: SELF    Best call back number: [phone]    Patient is needing: TO SCHEDULE HOSPITAL F/U APPT WITH DR. MCKOY FOR DVT. SHE WAS IN THE EMERGENCY ROOM LAST NIGHT IN [place] AT [facility] IN [place].

## 2023-03-07 ENCOUNTER — OFFICE VISIT (OUTPATIENT)
Dept: INTERNAL MEDICINE | Facility: CLINIC | Age: 88
End: 2023-03-07
Payer: MEDICARE

## 2023-03-07 VITALS
BODY MASS INDEX: 28.53 KG/M2 | DIASTOLIC BLOOD PRESSURE: 70 MMHG | HEART RATE: 83 BPM | WEIGHT: 161 LBS | OXYGEN SATURATION: 96 % | SYSTOLIC BLOOD PRESSURE: 142 MMHG | HEIGHT: 63 IN | TEMPERATURE: 97.7 F

## 2023-03-07 DIAGNOSIS — I83.812 VARICOSE VEINS OF LEFT LOWER EXTREMITY WITH PAIN: ICD-10-CM

## 2023-03-07 DIAGNOSIS — I82.442 ACUTE DEEP VEIN THROMBOSIS (DVT) OF TIBIAL VEIN OF LEFT LOWER EXTREMITY: Primary | ICD-10-CM

## 2023-03-07 DIAGNOSIS — G47.00 INSOMNIA, UNSPECIFIED TYPE: ICD-10-CM

## 2023-03-07 PROCEDURE — 99214 OFFICE O/P EST MOD 30 MIN: CPT | Performed by: NURSE PRACTITIONER

## 2023-03-07 RX ORDER — APIXABAN 5 MG/1
TABLET, FILM COATED ORAL
COMMUNITY
Start: 2023-03-06 | End: 2023-03-07

## 2023-03-07 RX ORDER — WARFARIN SODIUM 5 MG/1
TABLET ORAL
COMMUNITY
Start: 2023-03-06 | End: 2023-03-07

## 2023-03-07 RX ORDER — AMITRIPTYLINE HYDROCHLORIDE 25 MG/1
25 TABLET, FILM COATED ORAL NIGHTLY
Qty: 90 TABLET | Refills: 1 | Status: SHIPPED | OUTPATIENT
Start: 2023-03-07

## 2023-03-07 NOTE — PROGRESS NOTES
Transitional Care Follow Up Visit  Subjective     Isabel Handy is a 87 y.o. female who presents for a transitional care management visit.    Within 48 business hours after discharge our office contacted her via telephone to coordinate her care and needs.      I reviewed and discussed the details of that call along with the discharge summary, hospital problems, inpatient lab results, inpatient diagnostic studies, and consultation reports with Isabel.     Current outpatient and discharge medications have been reconciled for the patient.  Reviewed by: ED Mcgee    Current Outpatient Medications:   •  acetaminophen (TYLENOL) 500 MG tablet, Take 1 tablet by mouth Every 6 (Six) Hours As Needed for Mild Pain., Disp: 30 tablet, Rfl: 0  •  amitriptyline (ELAVIL) 25 MG tablet, Take 1 tablet by mouth Every Night., Disp: 90 tablet, Rfl: 1  •  atorvastatin (LIPITOR) 20 MG tablet, TAKE 1 TABLET BY MOUTH EVERY NIGHT, Disp: 90 tablet, Rfl: 1  •  Calmoseptine 0.44-20.6 % ointment, APPLY AS DIRECTED EVERY EVENING, Disp: , Rfl:   •  esomeprazole (nexIUM) 40 MG capsule, TAKE 1 CAPSULE BY MOUTH EVERY MORNING BEFORE BREAKFAST, Disp: 90 capsule, Rfl: 0  •  levothyroxine (SYNTHROID, LEVOTHROID) 75 MCG tablet, TAKE 1 TABLET BY MOUTH DAILY, Disp: 90 tablet, Rfl: 1  •  lisinopril (PRINIVIL,ZESTRIL) 20 MG tablet, TAKE 1 TABLET BY MOUTH DAILY, Disp: 90 tablet, Rfl: 1  •  loratadine (CLARITIN) 10 MG tablet, Take 1 tablet by mouth Daily., Disp: 30 tablet, Rfl: 0  •  nitrofurantoin, macrocrystal-monohydrate, (MACROBID) 100 MG capsule, Take 1 capsule by mouth 2 (Two) Times a Day., Disp: 14 capsule, Rfl: 0  •  phenazopyridine (PYRIDIUM) 200 MG tablet, Take 1 tablet by mouth 3 (Three) Times a Day As Needed for Bladder Spasms., Disp: 6 tablet, Rfl: 0  •  Probiotic Product (SOLUBLE FIBER/PROBIOTICS) chewable tablet, Chew 1 each Daily., Disp: , Rfl:   •  apixaban (ELIQUIS) 5 MG tablet tablet, Take 2 tabs twice daily X1 week, Disp: 30  tablet, Rfl: 0    No flowsheet data found.  Risk for Readmission (LACE) No data recorded    History of Present Illness   Course During Hospital Stay:  ER visit at [facility] in [place] 3/5th. Went with c/o LLE pain, pain with walking, started a few days prior. US showed anterior tibial vein DVT. She was started on Eliquis but it was going to be $800. She ended up getting a 1 day supply of eliquis for $35.  She reports she does drive a lot and sit more. But she does not sit for hours at a time. She did have fall several months ago. She also had COVID-19 in mid January.     She does report a swollen and painful vein in the back of her knee and up her thigh.      The following portions of the patient's history were reviewed and updated as appropriate: allergies, current medications, past family history, past medical history, past social history, past surgical history and problem list.    Review of Systems   Constitutional: Negative.    Respiratory: Negative.    Cardiovascular: Positive for leg swelling. Negative for chest pain and palpitations.   Psychiatric/Behavioral: Positive for sleep disturbance. Negative for dysphoric mood and suicidal ideas. The patient is nervous/anxious (intermittent).        Objective   Physical Exam  Constitutional:       Appearance: Normal appearance. She is well-developed.   Neck:      Thyroid: No thyromegaly.   Cardiovascular:      Rate and Rhythm: Normal rate and regular rhythm.      Heart sounds: Normal heart sounds.      Comments: mild  Pulmonary:      Effort: Pulmonary effort is normal.      Breath sounds: Normal breath sounds.   Musculoskeletal:      Cervical back: Normal range of motion and neck supple.      Right lower leg: Edema present.      Left lower leg: Edema present.   Lymphadenopathy:      Cervical: No cervical adenopathy.   Skin:     General: Skin is warm and dry.   Neurological:      Mental Status: She is alert.   Psychiatric:         Behavior: Behavior normal.          Thought Content: Thought content normal.         Judgment: Judgment normal.         Assessment & Plan   Diagnoses and all orders for this visit:    1. Acute deep vein thrombosis (DVT) of tibial vein of left lower extremity (HCC) (Primary)  -     apixaban (ELIQUIS) 5 MG tablet tablet; Take 2 tabs twice daily X1 week  Dispense: 30 tablet; Refill: 0    2. Insomnia, unspecified type  -     amitriptyline (ELAVIL) 25 MG tablet; Take 1 tablet by mouth Every Night.  Dispense: 90 tablet; Refill: 1    3. Varicose veins of left lower extremity with pain              1. Acute DVT- discussed options of blood thinners with patient, benefits and risks, stop ASA for now, try Eliquis   Will do 10 mg BID X 7 days, then take 5 mg BID X 7 days, then 2.5 mg BID for at least 3 months.   Notify or go to ER with any falls.   She will see hematology next week   Check in with cardiology   2. Varicosities - place referral to vascular   3. Insomnia- will refill elavil

## 2023-03-07 NOTE — PATIENT INSTRUCTIONS
1. Acute DVT- discussed options of blood thinners with patient, benefits and risks, stop ASA for now, try Eliquis   Will do 10 mg BID X 7 days, then take 5 mg BID X 7 days, then 2.5 mg BID for at least 3 months.   Notify or go to ER with any falls.   She will see hematology next week   Check in with cardiology   2. Varicosities - place referral to vascular   3. Insomnia- will refill elavil

## 2023-03-15 ENCOUNTER — OFFICE VISIT (OUTPATIENT)
Dept: ONCOLOGY | Facility: CLINIC | Age: 88
End: 2023-03-15
Payer: MEDICARE

## 2023-03-15 ENCOUNTER — LAB (OUTPATIENT)
Dept: OTHER | Facility: HOSPITAL | Age: 88
End: 2023-03-15
Payer: MEDICARE

## 2023-03-15 VITALS
HEIGHT: 62 IN | BODY MASS INDEX: 29.81 KG/M2 | SYSTOLIC BLOOD PRESSURE: 142 MMHG | OXYGEN SATURATION: 100 % | WEIGHT: 162 LBS | DIASTOLIC BLOOD PRESSURE: 83 MMHG | TEMPERATURE: 98 F | HEART RATE: 74 BPM | RESPIRATION RATE: 16 BRPM

## 2023-03-15 DIAGNOSIS — D72.820 LYMPHOCYTOSIS: ICD-10-CM

## 2023-03-15 DIAGNOSIS — D75.1 POLYCYTHEMIA: Primary | ICD-10-CM

## 2023-03-15 DIAGNOSIS — I82.462 ACUTE DEEP VEIN THROMBOSIS (DVT) OF CALF MUSCLE VEIN OF LEFT LOWER EXTREMITY: ICD-10-CM

## 2023-03-15 DIAGNOSIS — D75.1 POLYCYTHEMIA: ICD-10-CM

## 2023-03-15 LAB
BASOPHILS # BLD AUTO: 0.08 10*3/MM3 (ref 0–0.2)
BASOPHILS NFR BLD AUTO: 0.8 % (ref 0–1.5)
DEPRECATED RDW RBC AUTO: 48.1 FL (ref 37–54)
EOSINOPHIL # BLD AUTO: 0.27 10*3/MM3 (ref 0–0.4)
EOSINOPHIL NFR BLD AUTO: 2.8 % (ref 0.3–6.2)
ERYTHROCYTE [DISTWIDTH] IN BLOOD BY AUTOMATED COUNT: 14.1 % (ref 12.3–15.4)
HCT VFR BLD AUTO: 50.2 % (ref 34–46.6)
HGB BLD-MCNC: 15.4 G/DL (ref 12–15.9)
IMM GRANULOCYTES # BLD AUTO: 0.02 10*3/MM3 (ref 0–0.05)
IMM GRANULOCYTES NFR BLD AUTO: 0.2 % (ref 0–0.5)
LDH SERPL-CCNC: 237 U/L (ref 135–214)
LYMPHOCYTES # BLD AUTO: 4.21 10*3/MM3 (ref 0.7–3.1)
LYMPHOCYTES NFR BLD AUTO: 43.4 % (ref 19.6–45.3)
MCH RBC QN AUTO: 28.4 PG (ref 26.6–33)
MCHC RBC AUTO-ENTMCNC: 30.7 G/DL (ref 31.5–35.7)
MCV RBC AUTO: 92.6 FL (ref 79–97)
MONOCYTES # BLD AUTO: 0.7 10*3/MM3 (ref 0.1–0.9)
MONOCYTES NFR BLD AUTO: 7.2 % (ref 5–12)
NEUTROPHILS NFR BLD AUTO: 4.41 10*3/MM3 (ref 1.7–7)
NEUTROPHILS NFR BLD AUTO: 45.6 % (ref 42.7–76)
NRBC BLD AUTO-RTO: 0 /100 WBC (ref 0–0.2)
PLATELET # BLD AUTO: 286 10*3/MM3 (ref 140–450)
PMV BLD AUTO: 10.8 FL (ref 6–12)
RBC # BLD AUTO: 5.42 10*6/MM3 (ref 3.77–5.28)
URATE SERPL-MCNC: 5.1 MG/DL (ref 2.4–5.7)
WBC NRBC COR # BLD: 9.69 10*3/MM3 (ref 3.4–10.8)

## 2023-03-15 PROCEDURE — 99214 OFFICE O/P EST MOD 30 MIN: CPT | Performed by: INTERNAL MEDICINE

## 2023-03-15 PROCEDURE — 83615 LACTATE (LD) (LDH) ENZYME: CPT | Performed by: INTERNAL MEDICINE

## 2023-03-15 PROCEDURE — 85025 COMPLETE CBC W/AUTO DIFF WBC: CPT | Performed by: INTERNAL MEDICINE

## 2023-03-15 PROCEDURE — 3077F SYST BP >= 140 MM HG: CPT | Performed by: INTERNAL MEDICINE

## 2023-03-15 PROCEDURE — 1126F AMNT PAIN NOTED NONE PRSNT: CPT | Performed by: INTERNAL MEDICINE

## 2023-03-15 PROCEDURE — 84550 ASSAY OF BLOOD/URIC ACID: CPT | Performed by: INTERNAL MEDICINE

## 2023-03-15 PROCEDURE — 36415 COLL VENOUS BLD VENIPUNCTURE: CPT

## 2023-03-15 PROCEDURE — 3079F DIAST BP 80-89 MM HG: CPT | Performed by: INTERNAL MEDICINE

## 2023-03-15 RX ORDER — APIXABAN 5 MG/1
5 TABLET, FILM COATED ORAL 2 TIMES DAILY
COMMUNITY
Start: 2023-03-07 | End: 2023-03-20

## 2023-03-15 NOTE — PROGRESS NOTES
Subjective     CHIEF COMPLAINT:      Chief Complaint   Patient presents with   • Follow-up     Discuss blood clot in left leg      HISTORY OF PRESENT ILLNESS:     Isabel Handy is a 87 y.o. female patient who returns today for follow up on her polycythemia.  She is accompanied by her daughter.  She reports being diagnosed with DVT.  She had left leg pain that started on 3/4/2023.  No precipitating factor like traveling or trauma.  She had a venous Doppler and was diagnosed with a DVT in anterior tibial vein.  She was started on anticoagulation.  She was able to start Eliquis 10 mg twice daily x7 days followed by 5 mg twice daily which she is currently taking.  She continues to have pain but it has improved.  She initially needed to use crutches but she is no longer needing to do that.  She is able to put weight on the left leg.    She has no prior history of DVT.    Patient was taking aspirin prior to diagnosis of the DVT.  Aspirin was discontinued when Eliquis was started.    ROS:  Pertinent ROS is in the HPI.     Past medical, surgical, social and family history were reviewed.     MEDICATIONS:    Current Outpatient Medications:   •  acetaminophen (TYLENOL) 500 MG tablet, Take 1 tablet by mouth Every 6 (Six) Hours As Needed for Mild Pain., Disp: 30 tablet, Rfl: 0  •  amitriptyline (ELAVIL) 25 MG tablet, Take 1 tablet by mouth Every Night., Disp: 90 tablet, Rfl: 1  •  atorvastatin (LIPITOR) 20 MG tablet, TAKE 1 TABLET BY MOUTH EVERY NIGHT, Disp: 90 tablet, Rfl: 1  •  Calmoseptine 0.44-20.6 % ointment, APPLY AS DIRECTED EVERY EVENING, Disp: , Rfl:   •  Eliquis 5 MG tablet tablet, Take 1 tablet by mouth 2 (Two) Times a Day., Disp: , Rfl:   •  esomeprazole (nexIUM) 40 MG capsule, TAKE 1 CAPSULE BY MOUTH EVERY MORNING BEFORE BREAKFAST, Disp: 90 capsule, Rfl: 0  •  levothyroxine (SYNTHROID, LEVOTHROID) 75 MCG tablet, TAKE 1 TABLET BY MOUTH DAILY, Disp: 90 tablet, Rfl: 1  •  lisinopril (PRINIVIL,ZESTRIL) 20 MG tablet,  "TAKE 1 TABLET BY MOUTH DAILY, Disp: 90 tablet, Rfl: 1  •  nitrofurantoin, macrocrystal-monohydrate, (MACROBID) 100 MG capsule, Take 1 capsule by mouth 2 (Two) Times a Day., Disp: 14 capsule, Rfl: 0  •  Probiotic Product (SOLUBLE FIBER/PROBIOTICS) chewable tablet, Chew 1 each Daily., Disp: , Rfl:   •  loratadine (CLARITIN) 10 MG tablet, Take 1 tablet by mouth Daily. (Patient not taking: Reported on 3/15/2023), Disp: 30 tablet, Rfl: 0  •  phenazopyridine (PYRIDIUM) 200 MG tablet, Take 1 tablet by mouth 3 (Three) Times a Day As Needed for Bladder Spasms. (Patient not taking: Reported on 3/15/2023), Disp: 6 tablet, Rfl: 0  Objective     VITAL SIGNS:     Vitals:    03/15/23 1043   BP: 142/83   Pulse: 74   Resp: 16   Temp: 98 °F (36.7 °C)   TempSrc: Oral   SpO2: 100%   Weight: 73.5 kg (162 lb)   Height: 157 cm (61.81\")  Comment: new ht   PainSc: 0-No pain     Body mass index is 29.81 kg/m².     Wt Readings from Last 5 Encounters:   03/15/23 73.5 kg (162 lb)   03/07/23 73 kg (161 lb)   02/06/23 68 kg (150 lb)   01/30/23 68 kg (150 lb)   01/17/23 68 kg (150 lb)     PHYSICAL EXAMINATION:   GENERAL: The patient appears in good general condition, not in acute distress.   SKIN: No ecchymosis.  EYES: No jaundice. No pallor.  CHEST: Normal respiratory effort.  Lungs clear bilaterally.  No added sounds.  CVS: Normal S1-S2.  Grade 2 systolic murmur.  ABDOMEN: Soft. No tenderness. No Hepatomegaly. No Splenomegaly. No masses.  EXTREMITIES: Left knee and proximal calf tenderness.  No anterior calf tenderness.    DIAGNOSTIC DATA:     Results from last 7 days   Lab Units 03/15/23  1028   WBC 10*3/mm3 9.69   NEUTROS ABS 10*3/mm3 4.41   HEMOGLOBIN g/dL 15.4   HEMATOCRIT % 50.2*   PLATELETS 10*3/mm3 286     Component      Latest Ref Rng & Units 8/10/2022 3/15/2023   LDH      135 - 214 U/L 187 237 (H)     Component      Latest Ref Rng & Units 8/10/2022 3/15/2023   Uric Acid      2.4 - 5.7 mg/dL 5.0 5.1     Venous Doppler on 3/5/2023:  The " left common femoral, deep femoral, femoral, and popliteal veins show no evidence of intraluminal thrombus with normal compressibility, respiratory variation, and augmentation with distal compression. The left greater saphenous vein demonstrates normal compressibility with no evidence of intraluminal thrombus.     Evaluation of the deep veins of the calf demonstrates no flow and noncompressibility within the left anterior tibial vein.     IMPRESSION:   Noncompressible thrombus within the anterior tibial vein.     Assessment & Plan    *Polycythemia.    · It was identified in the labs dating back to 2015.    · Patient reports being told that she needed to donate blood but she never did so.    · Hemoglobin was 16.3 with a hematocrit of 49.3% on 11/24/2021.    · Patient does not have underlying lung disease or sleep apnea.  She does not smoke.    · Testing for JAK2 V617F mutation, JAK2 exam 12-15 mutation, CALReticulin and MPL was negative.  · Patient was suspected of having an element of hemoconcentration.  · We advised the patient to increase fluid intake and cut down on intake of iron rich food.  · Hemoglobin decreased to 15.1 and hematocrit decreased to 47% today.  · hemoglobin increased to 16.3 on 1/26/2022.  Hematocrit was 49.3%.   · We advised the patient to maintain adequate hydration and reduce intake of iron rich food.  · On 8/10/2022, hemoglobin improved to 15.0 and hematocrit improved to 47.8%.  · Labs today revealed hemoglobin 15.4 and hematocrit of 50.2%.  · She states that she did not have enough fluids before coming to the office today.  · Patient has a new DVT in the left leg that was diagnosed earlier this month.  · I explained that the DVT may have been in part secondary to the polycythemia and this may be an indicator of an underlying polycythemia vera.    *Lymphocytosis.    · Lymphocyte count increased to 3600 on 5/17/2018 and has been on the high side since that time.    · Lymphocyte count was 3850  on 11/24/2021.    · Peripheral blood flow cytometry on 11/24/2021 revealed kappa restricted B cells.  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    · This may also be attributed to underlying reactive process.   · Total WBC increased to 12,940 on 8/10/2022 with increase in the neutrophil count to 8,150. This is attributed to a recent UTI.   · Lymphocyte count was 3000 on the 20.  · Lymphocyte count is 4210 today.  · She has no lymphadenopathy on exam.    *Left calf DVT.  · Patient developed pain in the left leg on 3/4/2023 and had difficulty putting weight on it.    · Venous Doppler on 3/5/2023 revealed no flow and no compressibility within the left anterior tibial vein.  · No clear precipitating factor.  · She is on Eliquis 5 mg twice daily and the dose will be reduced in 4 days to 2.5 mg twice daily.   · The pain has decreased since she started anticoagulation.    · She is tolerating Eliquis.  No problem with bleeding.    · Since the DVT was a calf vein DVT, I recommended 3 months of anticoagulation.    PLAN:    1.  Continue Eliquis 5 mg twice daily x4 more days.  After that, the dose will be reduced to 2.5 mg twice daily.   2.  Obtain JAK2 mutation test with reflex to MPL and CALR.  3.  Continue to maintain adequate hydration and abdominal iron rich foods.   4.  Obtain venous Doppler of the left lower extremity in early June 2023.  5.  I will see her in follow-up 1 week after the venous Doppler.  CBC CMP LDH and uric acid will be obtained.       Devan Salmeron MD  03/15/23

## 2023-03-20 ENCOUNTER — LAB (OUTPATIENT)
Dept: LAB | Facility: HOSPITAL | Age: 88
End: 2023-03-20
Payer: MEDICARE

## 2023-03-20 ENCOUNTER — OFFICE VISIT (OUTPATIENT)
Dept: INTERNAL MEDICINE | Facility: CLINIC | Age: 88
End: 2023-03-20
Payer: MEDICARE

## 2023-03-20 VITALS
SYSTOLIC BLOOD PRESSURE: 150 MMHG | HEART RATE: 75 BPM | TEMPERATURE: 97.3 F | DIASTOLIC BLOOD PRESSURE: 82 MMHG | OXYGEN SATURATION: 99 % | BODY MASS INDEX: 29.81 KG/M2 | HEIGHT: 62 IN | WEIGHT: 162 LBS

## 2023-03-20 DIAGNOSIS — E78.5 HYPERLIPIDEMIA, UNSPECIFIED HYPERLIPIDEMIA TYPE: ICD-10-CM

## 2023-03-20 DIAGNOSIS — E03.9 HYPOTHYROIDISM, UNSPECIFIED TYPE: ICD-10-CM

## 2023-03-20 DIAGNOSIS — R01.1 HEART MURMUR: ICD-10-CM

## 2023-03-20 DIAGNOSIS — I10 ESSENTIAL HYPERTENSION: ICD-10-CM

## 2023-03-20 DIAGNOSIS — I82.462 ACUTE DEEP VEIN THROMBOSIS (DVT) OF CALF MUSCLE VEIN OF LEFT LOWER EXTREMITY: Primary | ICD-10-CM

## 2023-03-20 PROCEDURE — 80061 LIPID PANEL: CPT | Performed by: NURSE PRACTITIONER

## 2023-03-20 PROCEDURE — 1159F MED LIST DOCD IN RCRD: CPT | Performed by: NURSE PRACTITIONER

## 2023-03-20 PROCEDURE — 99214 OFFICE O/P EST MOD 30 MIN: CPT | Performed by: NURSE PRACTITIONER

## 2023-03-20 PROCEDURE — 80053 COMPREHEN METABOLIC PANEL: CPT | Performed by: NURSE PRACTITIONER

## 2023-03-20 PROCEDURE — 1160F RVW MEDS BY RX/DR IN RCRD: CPT | Performed by: NURSE PRACTITIONER

## 2023-03-20 PROCEDURE — 84443 ASSAY THYROID STIM HORMONE: CPT | Performed by: NURSE PRACTITIONER

## 2023-03-20 PROCEDURE — 3079F DIAST BP 80-89 MM HG: CPT | Performed by: NURSE PRACTITIONER

## 2023-03-20 PROCEDURE — 3077F SYST BP >= 140 MM HG: CPT | Performed by: NURSE PRACTITIONER

## 2023-03-20 PROCEDURE — 85025 COMPLETE CBC W/AUTO DIFF WBC: CPT | Performed by: NURSE PRACTITIONER

## 2023-03-20 PROCEDURE — 82306 VITAMIN D 25 HYDROXY: CPT | Performed by: NURSE PRACTITIONER

## 2023-03-20 RX ORDER — LISINOPRIL 30 MG/1
30 TABLET ORAL DAILY
Qty: 90 TABLET | Refills: 1 | Status: SHIPPED | OUTPATIENT
Start: 2023-03-20

## 2023-03-20 NOTE — PROGRESS NOTES
Subjective   Isabel Handy is a 87 y.o. female.      History of Present Illness   The patient is here today to F/U on lab work. She is feeling well.     Acute DVT 3/2023, on NAOC, UTD with hematology r/t poss polycythemia vera    HTN-Pt is doing well with current medication regimen, denies adverse reactions, compliant with medication schedule.   HPL-Pt is doing well with current medication regimen, denies adverse reactions, compliant with medication schedule.     To see cardiology on 3/24th.   The following portions of the patient's history were reviewed and updated as appropriate: allergies, current medications, past family history, past medical history, past social history, past surgical history and problem list.    Review of Systems   Constitutional: Negative for chills and fever.   Respiratory: Negative.    Cardiovascular: Negative.    Psychiatric/Behavioral: Negative for dysphoric mood and suicidal ideas. The patient is not nervous/anxious.        Objective   Physical Exam  Constitutional:       Appearance: Normal appearance. She is well-developed.   Neck:      Thyroid: No thyromegaly.   Cardiovascular:      Rate and Rhythm: Normal rate and regular rhythm.      Heart sounds: Murmur heard.    Systolic murmur is present with a grade of 2/6.  Pulmonary:      Effort: Pulmonary effort is normal.      Breath sounds: Normal breath sounds.   Musculoskeletal:      Cervical back: Normal range of motion and neck supple.   Lymphadenopathy:      Cervical: No cervical adenopathy.   Skin:     General: Skin is warm and dry.   Neurological:      Mental Status: She is alert.   Psychiatric:         Behavior: Behavior normal.         Thought Content: Thought content normal.         Judgment: Judgment normal.         Vitals:    03/20/23 1127   BP: 150/82   Pulse:    Temp:    SpO2:      Body mass index is 29.81 kg/m².    Current Outpatient Medications:   •  acetaminophen (TYLENOL) 500 MG tablet, Take 1 tablet by mouth Every 6  (Six) Hours As Needed for Mild Pain., Disp: 30 tablet, Rfl: 0  •  amitriptyline (ELAVIL) 25 MG tablet, Take 1 tablet by mouth Every Night., Disp: 90 tablet, Rfl: 1  •  atorvastatin (LIPITOR) 20 MG tablet, TAKE 1 TABLET BY MOUTH EVERY NIGHT, Disp: 90 tablet, Rfl: 1  •  Calmoseptine 0.44-20.6 % ointment, APPLY AS DIRECTED EVERY EVENING, Disp: , Rfl:   •  esomeprazole (nexIUM) 40 MG capsule, TAKE 1 CAPSULE BY MOUTH EVERY MORNING BEFORE BREAKFAST, Disp: 90 capsule, Rfl: 0  •  levothyroxine (SYNTHROID, LEVOTHROID) 75 MCG tablet, TAKE 1 TABLET BY MOUTH DAILY, Disp: 90 tablet, Rfl: 1  •  lisinopril (PRINIVIL,ZESTRIL) 30 MG tablet, Take 1 tablet by mouth Daily., Disp: 90 tablet, Rfl: 1  •  nitrofurantoin, macrocrystal-monohydrate, (MACROBID) 100 MG capsule, Take 1 capsule by mouth 2 (Two) Times a Day., Disp: 14 capsule, Rfl: 0  •  phenazopyridine (PYRIDIUM) 200 MG tablet, Take 1 tablet by mouth 3 (Three) Times a Day As Needed for Bladder Spasms., Disp: 6 tablet, Rfl: 0  •  Probiotic Product (SOLUBLE FIBER/PROBIOTICS) chewable tablet, Chew 1 each Daily., Disp: , Rfl:   •  apixaban (ELIQUIS) 2.5 MG tablet tablet, Take 1 tablet by mouth 2 (Two) Times a Day., Disp: 60 tablet, Rfl: 2    Assessment & Plan   Diagnoses and all orders for this visit:    1. Acute deep vein thrombosis (DVT) of calf muscle vein of left lower extremity (HCC) (Primary)  -     apixaban (ELIQUIS) 2.5 MG tablet tablet; Take 1 tablet by mouth 2 (Two) Times a Day.  Dispense: 60 tablet; Refill: 2    2. Heart murmur    3. Hypothyroidism, unspecified type    4. Hyperlipidemia, unspecified hyperlipidemia type    5. Essential hypertension  -     lisinopril (PRINIVIL,ZESTRIL) 30 MG tablet; Take 1 tablet by mouth Daily.  Dispense: 90 tablet; Refill: 1               1. Hypothyroidism- will check lab and call results.   2. DVT- hematology recommends 3 months of anticoagulation, she has an US scheduled for [place]  3. Heart murmur- mild, new to patient, to see cardiology  on Friday, will mention, discussed ECHO  4. HPL- check lab  5. HTN- running a little high at home and here, increase lisinopril to 30 mg daily.     She reports UTD with mammo

## 2023-03-24 ENCOUNTER — OFFICE VISIT (OUTPATIENT)
Dept: CARDIOLOGY | Facility: CLINIC | Age: 88
End: 2023-03-24
Payer: MEDICARE

## 2023-03-24 VITALS
SYSTOLIC BLOOD PRESSURE: 150 MMHG | BODY MASS INDEX: 29.26 KG/M2 | DIASTOLIC BLOOD PRESSURE: 86 MMHG | HEIGHT: 62 IN | HEART RATE: 80 BPM | WEIGHT: 159 LBS

## 2023-03-24 DIAGNOSIS — E78.5 HYPERLIPIDEMIA, UNSPECIFIED HYPERLIPIDEMIA TYPE: ICD-10-CM

## 2023-03-24 DIAGNOSIS — R01.1 HEART MURMUR: ICD-10-CM

## 2023-03-24 DIAGNOSIS — R09.89 BILATERAL CAROTID BRUITS: ICD-10-CM

## 2023-03-24 DIAGNOSIS — I10 ESSENTIAL HYPERTENSION: ICD-10-CM

## 2023-03-24 DIAGNOSIS — I49.3 PVC'S (PREMATURE VENTRICULAR CONTRACTIONS): Primary | ICD-10-CM

## 2023-03-24 PROCEDURE — 3079F DIAST BP 80-89 MM HG: CPT | Performed by: INTERNAL MEDICINE

## 2023-03-24 PROCEDURE — 3077F SYST BP >= 140 MM HG: CPT | Performed by: INTERNAL MEDICINE

## 2023-03-24 PROCEDURE — 93000 ELECTROCARDIOGRAM COMPLETE: CPT | Performed by: INTERNAL MEDICINE

## 2023-03-24 PROCEDURE — 99214 OFFICE O/P EST MOD 30 MIN: CPT | Performed by: INTERNAL MEDICINE

## 2023-03-24 PROCEDURE — 1160F RVW MEDS BY RX/DR IN RCRD: CPT | Performed by: INTERNAL MEDICINE

## 2023-03-24 PROCEDURE — 1159F MED LIST DOCD IN RCRD: CPT | Performed by: INTERNAL MEDICINE

## 2023-03-24 NOTE — PROGRESS NOTES
Date of Office Visit: 2023  Encounter Provider: Jannette Mcmahon MD  Place of Service: [facility] CARDIOLOGY  Patient Name: Isabel Handy  :1935      Patient ID:  Isabel Handy is a 87 y.o. female is here for  followup for PVCs and hypertension.        History of Present Illness    She is followed for PVCs.  She has history of hypertension, hyperlipidemia, history of esophageal strictures requiring dilation.     She woke with these on the morning of 3/15/2016.  She was sent to the chest pain center.  Her troponin was negative.  Her TSH was well-controlled.  They continued to cause issues for her as well some intermittent chest pain and pressure.  Because of her symptoms she was sent for dobutamine stress echocardiogram which was done 3/16/2016.  This showed no evidence of ischemia.  Her aortic valve had mild calcification on it.  Her baseline ejection fraction was 64%.  There was another valve disease.  She was sizes were normal.  He was a normal stress echocardiogram.  She also had a Holter recording done 3/15/2016.  This showed 100 PVCs per hour.  There were rare PACs.     She is a history of carotid stenosis diagnosed in the past.  She has thyroid disease which is normally control.  She is very strong family history of cardiac disease.  Both of her parents of myocardial infarctions and she's had 2 brothers with myocardial infarctions as well.     Echo on 2020 shows ejection fraction 66% with grade 1 diastolic dysfunction, aortic valve calcification without stenosis, mild tricuspid insufficiency.  Vascular screening, carotids only, done 2021 showed mild right carotid artery stenosis and plaque of the left carotid artery without stenosis.     Labs done 3/20/2023 show normal CMP, total cholesterol 202, HDL 91, LDL 96, VLDL 15, triglycerides 85, normal CBC.  Her blood pressure is high here today but she just had her lisinopril increased to 30 mg last  week.  She does not check her blood pressure at home on a routine basis but does have a blood pressure cuff there.  She does not feel her heart racing or skipping and she had no dizziness, syncope or falls.  She has no orthopnea or PND.  She has no exertional chest tightness or pressure.  She has mild chronic lower extremity edema.  She takes her medications as directed felt difficulty.  She did develop a blood clot in her left lower extremity and is now on Eliquis for this.  This was diagnosed about 2 weeks ago.       Past Medical History:   Diagnosis Date   • Arrhythmia    • Arthritis    • GERD (gastroesophageal reflux disease)    • History of pneumonia    • Hyperlipidemia    • Hypertension    • Hypothyroidism    • Skin cancer    • Subarachnoid hemorrhage (HCC)    • Vasodepressor syncope          Past Surgical History:   Procedure Laterality Date   • APPENDECTOMY N/A    • BLEPHAROPLASTY Bilateral    • BREAST CYST EXCISION      to remove cysts   • BREAST IMPLANT SURGERY Bilateral 05/20/2002    Dr. Marissa Cobos, [facility]   • BUNIONECTOMY Bilateral    • CATARACT EXTRACTION Bilateral    • COLONOSCOPY N/A 9/21/2018    Procedure: COLONOSCOPY into cecum and T.I. with cold biopsy polypectomy;  Surgeon: Roly Be MD;  Location: [facility] ENDOSCOPY;  Service: Gastroenterology   • CORRECTION HAMMER TOE Left 12/09/2013    Revision correction through an altered surgical field of the left second hammertoe, correciton of the left third hammertoe, DuVries plantar condylectomy of the left second metatarsal, Plantar DuVries condylectomy of the left third metatarsal, partial excision of the left fifth metatarsal head for alleviation of bunionette deformity-Dr. Jordan Montilla, [facility]   • ENDOSCOPY N/A 9/21/2018    Procedure: ESOPHAGOGASTRODUODENOSCOPY with esophageal dilation #54 Castellanos;  Surgeon: Roly Be MD;  Location: [facility] ENDOSCOPY;  Service: Gastroenterology   • ENDOSCOPY AND COLONOSCOPY N/A 12/29/2010    Hiatal  hernia, chronic gastritis: biopsied, normal examined duodenum, normal examined ileum, mild diverticulosis sigmoid colon-Dr. Roly Be, [facility]   • TOTAL ABDOMINAL HYSTERECTOMY Bilateral        Current Outpatient Medications on File Prior to Visit   Medication Sig Dispense Refill   • acetaminophen (TYLENOL) 500 MG tablet Take 1 tablet by mouth Every 6 (Six) Hours As Needed for Mild Pain. 30 tablet 0   • amitriptyline (ELAVIL) 25 MG tablet Take 1 tablet by mouth Every Night. 90 tablet 1   • apixaban (ELIQUIS) 2.5 MG tablet tablet Take 1 tablet by mouth 2 (Two) Times a Day. 60 tablet 2   • atorvastatin (LIPITOR) 20 MG tablet TAKE 1 TABLET BY MOUTH EVERY NIGHT 90 tablet 1   • Calmoseptine 0.44-20.6 % ointment APPLY AS DIRECTED EVERY EVENING     • esomeprazole (nexIUM) 40 MG capsule TAKE 1 CAPSULE BY MOUTH EVERY MORNING BEFORE BREAKFAST 90 capsule 0   • levothyroxine (SYNTHROID, LEVOTHROID) 75 MCG tablet TAKE 1 TABLET BY MOUTH DAILY 90 tablet 1   • lisinopril (PRINIVIL,ZESTRIL) 30 MG tablet Take 1 tablet by mouth Daily. 90 tablet 1   • nitrofurantoin, macrocrystal-monohydrate, (MACROBID) 100 MG capsule Take 1 capsule by mouth 2 (Two) Times a Day. (Patient taking differently: Take 1 capsule by mouth Daily.) 14 capsule 0   • Probiotic Product (SOLUBLE FIBER/PROBIOTICS) chewable tablet Chew 1 each Daily.     • VITAMIN D PO Take  by mouth.     • phenazopyridine (PYRIDIUM) 200 MG tablet Take 1 tablet by mouth 3 (Three) Times a Day As Needed for Bladder Spasms. (Patient not taking: Reported on 3/24/2023) 6 tablet 0   • [DISCONTINUED] escitalopram (Lexapro) 10 MG tablet Take 1 tablet by mouth Daily. 30 tablet 6     No current facility-administered medications on file prior to visit.       Social History     Socioeconomic History   • Marital status:    • Number of children: 3   Tobacco Use   • Smoking status: Former     Packs/day: 0.25     Years: 2.00     Pack years: 0.50     Types: Cigarettes     Quit date: 10/10/1994      "Years since quittin.4     Passive exposure: Past   • Smokeless tobacco: Never   Vaping Use   • Vaping Use: Never used   Substance and Sexual Activity   • Alcohol use: Never     Alcohol/week: 1.0 standard drink     Types: 1 Glasses of wine per week   • Drug use: No   • Sexual activity: Defer           ROS    Procedures    ECG 12 Lead    Date/Time: 3/24/2023 11:23 AM  Performed by: Jannette Mcmahon MD  Authorized by: Jannette Mcmahon MD   Comparison: compared with previous ECG   Similar to previous ECG  Rhythm: sinus rhythm    Clinical impression: normal ECG                Objective:      Vitals:    23 1109   BP: 150/86   Pulse: 80   Weight: 72.1 kg (159 lb)   Height: 157 cm (61.81\")     Body mass index is 29.26 kg/m².    Vitals reviewed.   Constitutional:       General: Not in acute distress.     Appearance: Well-developed. Not diaphoretic.   Eyes:      General: No scleral icterus.     Conjunctiva/sclera: Conjunctivae normal.   HENT:      Head: Normocephalic and atraumatic.   Neck:      Thyroid: No thyromegaly.      Vascular: No carotid bruit or JVD.      Lymphadenopathy: No cervical adenopathy.   Pulmonary:      Effort: Pulmonary effort is normal. No respiratory distress.      Breath sounds: Normal breath sounds. No wheezing. No rhonchi. No rales.   Chest:      Chest wall: Not tender to palpatation.   Cardiovascular:      Normal rate. Regular rhythm.      Murmurs: There is a grade 3/6 midsystolic murmur at the URSB and ULSB.      No gallop.   Pulses:     Carotid: 2+ with bruit bilaterally.     Radial: 2+ bilaterally.     Dorsalis pedis: 2+ bilaterally.     Posterior tibial: 2+ bilaterally.  Edema:     Peripheral edema absent.   Abdominal:      General: Bowel sounds are normal. There is no distension or abdominal bruit.      Palpations: Abdomen is soft. There is no abdominal mass.      Tenderness: There is no abdominal tenderness.   Musculoskeletal:         General: No deformity.      " Extremities: No clubbing present.     Cervical back: Neck supple. Skin:     General: Skin is warm and dry. There is no cyanosis.      Coloration: Skin is not pale.      Findings: No rash.   Neurological:      Mental Status: Alert and oriented to person, place, and time.      Cranial Nerves: No cranial nerve deficit.   Psychiatric:         Judgment: Judgment normal.         Lab Review:       Assessment:      Diagnosis Plan   1. PVC's (premature ventricular contractions)        2. Hyperlipidemia, unspecified hyperlipidemia type        3. Essential hypertension        4. Heart murmur  Adult Transthoracic Echo Complete W/ Cont if Necessary Per Protocol      5. Bilateral carotid bruits  US Carotid Bilateral LAG              1. PVCs.  She doesn't have any today.  They're intermittent.  2. Bilateral carotid bruits, set up carotid ultrasound.  3. Family history cardiovascular disease.    4. Lower Extremity edema.  This is noncardiac.  5. Sedentary, encouraged exercise.   6. Elevated hemoglobin, has an appoint with hematology, stopped her estradiol due to this.  7. Aortic valve calcification causing murmur.  8. Hypertension, blood pressure is elevated here today.  Her goal is<120/80.  I have advised that she take her blood pressure at home after being seated quietly for 5 minutes with her arm at heart height, not within an hour of exercise of blood pressure medications.  She is can record these blood pressures for me and bring them back to the office as well as bring her blood pressure cuff to be checked.  9. Left lower extremity DVT, now on Eliquis.        Plan:       See Malorie in 6 weeks for blood pressure check as she may need medication changes.  Had a recent increase in her lisinopril.

## 2023-03-31 LAB — REF LAB TEST RESULTS: NORMAL

## 2023-04-25 ENCOUNTER — HOSPITAL ENCOUNTER (OUTPATIENT)
Dept: CARDIOLOGY | Facility: HOSPITAL | Age: 88
Discharge: HOME | End: 2023-04-25
Payer: MEDICARE

## 2023-04-25 ENCOUNTER — TELEPHONE (OUTPATIENT)
Dept: CARDIOLOGY | Facility: CLINIC | Age: 88
End: 2023-04-25
Payer: MEDICARE

## 2023-04-25 ENCOUNTER — HOSPITAL ENCOUNTER (OUTPATIENT)
Dept: ULTRASOUND IMAGING | Facility: HOSPITAL | Age: 88
Discharge: HOME | End: 2023-04-25
Payer: MEDICARE

## 2023-04-25 VITALS
HEART RATE: 70 BPM | SYSTOLIC BLOOD PRESSURE: 127 MMHG | HEIGHT: 62 IN | DIASTOLIC BLOOD PRESSURE: 77 MMHG | BODY MASS INDEX: 29.26 KG/M2 | WEIGHT: 159 LBS

## 2023-04-25 DIAGNOSIS — R09.89 BILATERAL CAROTID BRUITS: ICD-10-CM

## 2023-04-25 DIAGNOSIS — R01.1 HEART MURMUR: ICD-10-CM

## 2023-04-25 LAB
AORTIC DIMENSIONLESS INDEX: 0.7 (DI)
ASCENDING AORTA: 3.4 CM
BH CV ECHO MEAS - ACS: 1.38 CM
BH CV ECHO MEAS - AO MAX PG: 9.1 MMHG
BH CV ECHO MEAS - AO MEAN PG: 4.3 MMHG
BH CV ECHO MEAS - AO V2 MAX: 150.8 CM/SEC
BH CV ECHO MEAS - AO V2 VTI: 32.8 CM
BH CV ECHO MEAS - AVA(I,D): 1.78 CM2
BH CV ECHO MEAS - EDV(CUBED): 59.5 ML
BH CV ECHO MEAS - EDV(MOD-SP2): 66 ML
BH CV ECHO MEAS - EDV(MOD-SP4): 71 ML
BH CV ECHO MEAS - EF(MOD-BP): 60.1 %
BH CV ECHO MEAS - EF(MOD-SP2): 63.6 %
BH CV ECHO MEAS - EF(MOD-SP4): 54.9 %
BH CV ECHO MEAS - ESV(CUBED): 21.3 ML
BH CV ECHO MEAS - ESV(MOD-SP2): 24 ML
BH CV ECHO MEAS - ESV(MOD-SP4): 32 ML
BH CV ECHO MEAS - FS: 29 %
BH CV ECHO MEAS - IVS/LVPW: 1.02 CM
BH CV ECHO MEAS - IVSD: 1.08 CM
BH CV ECHO MEAS - LAT PEAK E' VEL: 9.5 CM/SEC
BH CV ECHO MEAS - LV DIASTOLIC VOL/BSA (35-75): 40.9 CM2
BH CV ECHO MEAS - LV MASS(C)D: 134.6 GRAMS
BH CV ECHO MEAS - LV MAX PG: 3.9 MMHG
BH CV ECHO MEAS - LV MEAN PG: 2.14 MMHG
BH CV ECHO MEAS - LV SYSTOLIC VOL/BSA (12-30): 18.5 CM2
BH CV ECHO MEAS - LV V1 MAX: 99 CM/SEC
BH CV ECHO MEAS - LV V1 VTI: 22.9 CM
BH CV ECHO MEAS - LVIDD: 3.9 CM
BH CV ECHO MEAS - LVIDS: 2.8 CM
BH CV ECHO MEAS - LVOT AREA: 2.5 CM2
BH CV ECHO MEAS - LVOT DIAM: 1.8 CM
BH CV ECHO MEAS - LVPWD: 1.06 CM
BH CV ECHO MEAS - MED PEAK E' VEL: 7.5 CM/SEC
BH CV ECHO MEAS - MV A DUR: 0.13 SEC
BH CV ECHO MEAS - MV A MAX VEL: 105.3 CM/SEC
BH CV ECHO MEAS - MV DEC SLOPE: 229.9 CM/SEC2
BH CV ECHO MEAS - MV DEC TIME: 250 MSEC
BH CV ECHO MEAS - MV E MAX VEL: 88.6 CM/SEC
BH CV ECHO MEAS - MV E/A: 0.84
BH CV ECHO MEAS - MV MAX PG: 4.8 MMHG
BH CV ECHO MEAS - MV MEAN PG: 1.92 MMHG
BH CV ECHO MEAS - MV P1/2T: 121.5 MSEC
BH CV ECHO MEAS - MV V2 VTI: 31.5 CM
BH CV ECHO MEAS - MVA(P1/2T): 1.81 CM2
BH CV ECHO MEAS - MVA(VTI): 1.85 CM2
BH CV ECHO MEAS - PULM A REVS DUR: 0.14 SEC
BH CV ECHO MEAS - PULM A REVS VEL: 36.2 CM/SEC
BH CV ECHO MEAS - PULM DIAS VEL: 51.9 CM/SEC
BH CV ECHO MEAS - PULM S/D: 1.24
BH CV ECHO MEAS - PULM SYS VEL: 64.5 CM/SEC
BH CV ECHO MEAS - RAP SYSTOLE: 3 MMHG
BH CV ECHO MEAS - RV MAX PG: 1.33 MMHG
BH CV ECHO MEAS - RV V1 MAX: 57.6 CM/SEC
BH CV ECHO MEAS - RV V1 VTI: 13.4 CM
BH CV ECHO MEAS - RVSP: 24.9 MMHG
BH CV ECHO MEAS - SI(MOD-SP2): 24.2 ML/M2
BH CV ECHO MEAS - SI(MOD-SP4): 22.5 ML/M2
BH CV ECHO MEAS - SV(LVOT): 58.2 ML
BH CV ECHO MEAS - SV(MOD-SP2): 42 ML
BH CV ECHO MEAS - SV(MOD-SP4): 39 ML
BH CV ECHO MEAS - TAPSE (>1.6): 2.01 CM
BH CV ECHO MEAS - TR MAX PG: 21.9 MMHG
BH CV ECHO MEAS - TR MAX VEL: 234.2 CM/SEC
BH CV ECHO MEASUREMENTS AVERAGE E/E' RATIO: 10.42
BH CV XLRA - RV BASE: 3 CM
BH CV XLRA - RV LENGTH: 6.3 CM
BH CV XLRA - RV MID: 2.01 CM
BH CV XLRA - TDI S': 9.9 CM/SEC
LEFT ATRIUM VOLUME INDEX: 18.2 ML/M2
MAXIMAL PREDICTED HEART RATE: 135 BPM
SINUS: 2.7 CM
STRESS TARGET HR: 115 BPM

## 2023-04-25 PROCEDURE — 93306 TTE W/DOPPLER COMPLETE: CPT | Performed by: INTERNAL MEDICINE

## 2023-04-25 PROCEDURE — 93306 TTE W/DOPPLER COMPLETE: CPT

## 2023-04-25 PROCEDURE — 93880 EXTRACRANIAL BILAT STUDY: CPT

## 2023-04-25 NOTE — TELEPHONE ENCOUNTER
Notified patient of results. She verbalized understanding.    Regine Doe, RN  [place] [facility] [place]

## 2023-04-25 NOTE — TELEPHONE ENCOUNTER
Notified patient of results. She verbalized understanding.    Regine Doe, RN  [place] [facility] [place]

## 2023-04-28 ENCOUNTER — OFFICE VISIT (OUTPATIENT)
Dept: CARDIOLOGY | Facility: CLINIC | Age: 88
End: 2023-04-28
Payer: MEDICARE

## 2023-04-28 VITALS
RESPIRATION RATE: 16 BRPM | BODY MASS INDEX: 29.63 KG/M2 | SYSTOLIC BLOOD PRESSURE: 158 MMHG | HEIGHT: 62 IN | DIASTOLIC BLOOD PRESSURE: 76 MMHG | HEART RATE: 73 BPM | OXYGEN SATURATION: 99 % | WEIGHT: 161 LBS

## 2023-04-28 DIAGNOSIS — I82.462 ACUTE DEEP VEIN THROMBOSIS (DVT) OF CALF MUSCLE VEIN OF LEFT LOWER EXTREMITY: ICD-10-CM

## 2023-04-28 DIAGNOSIS — I49.3 PVC'S (PREMATURE VENTRICULAR CONTRACTIONS): ICD-10-CM

## 2023-04-28 DIAGNOSIS — R55 SYNCOPE, UNSPECIFIED SYNCOPE TYPE: Primary | ICD-10-CM

## 2023-04-28 DIAGNOSIS — I65.29 CAROTID ATHEROSCLEROSIS, UNSPECIFIED LATERALITY: ICD-10-CM

## 2023-04-28 DIAGNOSIS — I10 ESSENTIAL HYPERTENSION: ICD-10-CM

## 2023-04-28 DIAGNOSIS — E78.5 HYPERLIPIDEMIA, UNSPECIFIED HYPERLIPIDEMIA TYPE: ICD-10-CM

## 2023-04-28 DIAGNOSIS — R00.2 PALPITATIONS: ICD-10-CM

## 2023-04-28 RX ORDER — LISINOPRIL 40 MG/1
40 TABLET ORAL DAILY
Qty: 30 TABLET | Refills: 11 | Status: SHIPPED | OUTPATIENT
Start: 2023-04-28

## 2023-04-28 NOTE — PROGRESS NOTES
"      [facility] CARDIOLOGY  [address]  [address]  [place] [address]  Phone: [phone]  Fax: [phone]  Patient Name: Isabel Handy  :1935  Age: 87 y.o.  Primary Cardiologist: Jannette Mcmahon MD  Encounter Provider:  ED Jacobs    History of Present Illness     Isabel Handy is a 87 y.o.  female whose medical history includes hypertension, hyperlipidemia, history of esophageal strictures s/p dilatation, carotid artery stenosis, thyroid disease.  She is followed in our office by Dr. Mcmahon for PVCs and hypertension. I have reviewed the past medical records in preparation of today's visit.     Follow-up:  She is here for 1 month follow-up and I am seeing her for the first time today.  At last visit her lisinopril was just increased to 30 mg and her blood pressure was still high; she is here today for follow-up on her blood pressure.  Her BP is running 140s to 150s over 70s.  She was recently seen in the ER after a syncopal episode related to franco low; she felt shaky.  There was some concern she had UTI but this was ruled out.  She follows with  in [place].  She denies chest pain, dyspnea with exertion, orthopnea, palpitations, or further episodes of syncope.  She has occasional swelling in her ankles.  She is taking her medications as prescribed.    Data Review     The following data was reviewed by ED Jacobs on 23:    Vital Signs:   /76 (BP Location: Left arm, Patient Position: Sitting, Cuff Size: Adult)   Pulse 73   Resp 16   Ht 157.5 cm (62\")   Wt 73 kg (161 lb)   SpO2 99%   BMI 29.45 kg/m²       Weight:  Wt Readings from Last 3 Encounters:   23 73 kg (161 lb)   23 72.1 kg (159 lb)   23 72.1 kg (159 lb)     Body mass index is 29.45 kg/m².    Below is a summary of pertinent cardiology findings:  • Both of her parents had MIs and she had 2 brothers with MIs as " well.  • [place] [facility] [place].  Dobutamine stress echocardiogram showed no evidence of ischemia, aortic valve with mild calcification, EF 64%, and no other valvular disease.  Holter recording showed 100 PVCs per hour and rare PACs.  • December 2020 echocardiogram showed EF 66%, grade 1 diastolic dysfunction, aortic valve calcification without stenosis, mild tricuspid valve insufficiency.  • May 2021 carotid artery Doppler screening showed mild right carotid artery stenosis and plaque of the left carotid artery without stenosis.  • March 2023 she was diagnosed with left lower extremity DVT and is on apixaban 2.5 mg twice daily.  • April 2023 echocardiogram shows EF 62.1%, normal LV diastolic function, mild calcification of the aortic valve, and normal RVSP.  Carotid artery Doppler study showed mild to moderate atheromatous plaque in the right carotid and mild atheromatous plaque in the left carotid.    Labs:  • 04/07/2023:  cr 0.7, K 3.9, otherwise unremarkable CMP, Hgb 14.7, Plt 263, TSH 0.560      ECG 12 Lead    Date/Time: 4/28/2023 10:41 AM  Performed by: Bella Davidson APRN  Authorized by: Bella Davidson APRN   Comparison: compared with previous ECG from 3/24/2023  Similar to previous ECG  Rhythm: sinus rhythm  Rate: normal  BPM: 73  ST Segments: ST segments normal  Other findings: low voltage    Clinical impression: normal ECG            Medications     Allergies as of 04/28/2023 - Reviewed 04/28/2023   Allergen Reaction Noted   • Gatifloxacin Arrhythmia 03/15/2016   • Rosuvastatin Myalgia 03/15/2016   • Bactrim [sulfamethoxazole-trimethoprim] Headache 10/04/2020         Current Outpatient Medications:   •  acetaminophen (TYLENOL) 500 MG tablet, Take 1 tablet by mouth Every 6 (Six) Hours As Needed for Mild Pain., Disp: 30 tablet, Rfl: 0  •  amitriptyline (ELAVIL) 25 MG tablet, Take 1 tablet by mouth Every Night., Disp: 90 tablet, Rfl: 1  •   apixaban (ELIQUIS) 2.5 MG tablet tablet, Take 1 tablet by mouth 2 (Two) Times a Day., Disp: 60 tablet, Rfl: 2  •  atorvastatin (LIPITOR) 20 MG tablet, TAKE 1 TABLET BY MOUTH EVERY NIGHT, Disp: 90 tablet, Rfl: 1  •  Calmoseptine 0.44-20.6 % ointment, APPLY AS DIRECTED EVERY EVENING, Disp: , Rfl:   •  esomeprazole (nexIUM) 40 MG capsule, TAKE 1 CAPSULE BY MOUTH EVERY MORNING BEFORE BREAKFAST, Disp: 90 capsule, Rfl: 0  •  levothyroxine (SYNTHROID, LEVOTHROID) 75 MCG tablet, TAKE 1 TABLET BY MOUTH DAILY, Disp: 90 tablet, Rfl: 1  •  nitrofurantoin, macrocrystal-monohydrate, (MACROBID) 100 MG capsule, Take 1 capsule by mouth 2 (Two) Times a Day. (Patient taking differently: Take 1 capsule by mouth Daily.), Disp: 14 capsule, Rfl: 0  •  phenazopyridine (PYRIDIUM) 200 MG tablet, Take 1 tablet by mouth 3 (Three) Times a Day As Needed for Bladder Spasms., Disp: 6 tablet, Rfl: 0  •  Probiotic Product (SOLUBLE FIBER/PROBIOTICS) chewable tablet, Chew 1 each Daily., Disp: , Rfl:   •  VITAMIN D PO, Take  by mouth., Disp: , Rfl:   •  lisinopril (PRINIVIL,ZESTRIL) 40 MG tablet, Take 1 tablet by mouth Daily., Disp: 30 tablet, Rfl: 11     Past History, Review of Systems, Exam     Past Medical History:   Diagnosis Date   • Arrhythmia    • Arthritis    • GERD (gastroesophageal reflux disease)    • History of pneumonia    • Hyperlipidemia    • Hypertension    • Hypothyroidism    • Skin cancer    • Subarachnoid hemorrhage    • Vasodepressor syncope        Past Surgical History:   has a past surgical history that includes Blepharoplasty (Bilateral); Colonoscopy (N/A, 9/21/2018); Esophagogastroduodenoscopy (N/A, 9/21/2018); Correction hammer toe (Left, 12/09/2013); endoscopy and colonoscopy (N/A, 12/29/2010); Breast cyst excision; Total abdominal hysterectomy (Bilateral); Bunionectomy (Bilateral); Appendectomy (N/A); Cataract extraction (Bilateral); and Breast Implant Revision (Bilateral, 05/20/2002).     Social History     Socioeconomic  History   • Marital status:    • Number of children: 3   Tobacco Use   • Smoking status: Former     Packs/day: 0.25     Years: 2.00     Pack years: 0.50     Types: Cigarettes     Quit date: 10/10/1994     Years since quittin.6     Passive exposure: Past   • Smokeless tobacco: Never   Vaping Use   • Vaping Use: Never used   Substance and Sexual Activity   • Alcohol use: Never     Alcohol/week: 1.0 standard drink     Types: 1 Glasses of wine per week   • Drug use: No   • Sexual activity: Defer       Review of Systems   Cardiovascular: Positive for leg swelling. Negative for chest pain, claudication, cyanosis, dyspnea on exertion, irregular heartbeat, near-syncope, orthopnea, palpitations, paroxysmal nocturnal dyspnea and syncope.       Vitals reviewed.   Constitutional:       Appearance: Not in distress.   Eyes:      Conjunctiva/sclera: Conjunctivae normal.      Pupils: Pupils are equal, round, and reactive to light.   HENT:      Head: Normocephalic.      Nose: Nose normal.    Mouth/Throat:      Pharynx: Oropharynx is clear.   Neck:      Vascular: JVD normal.   Pulmonary:      Effort: Pulmonary effort is normal.      Breath sounds: Normal breath sounds. No wheezing. No rhonchi. No rales.   Cardiovascular:      Normal rate. Regular rhythm. Normal S1. Normal S2.      Murmurs: There is no murmur.   Pulses:     Intact distal pulses.   Edema:     Peripheral edema absent.   Abdominal:      General: Bowel sounds are normal. There is no distension.      Palpations: Abdomen is soft.      Tenderness: There is no abdominal tenderness.   Musculoskeletal: Normal range of motion.      Cervical back: Normal range of motion and neck supple. Skin:     General: Skin is warm and dry.   Neurological:      Mental Status: Alert and oriented to person, place and time.   Psychiatric:         Attention and Perception: Attention normal.         Mood and Affect: Mood normal.         Speech: Speech normal.         Behavior: Behavior  is cooperative.          Assessment and Plan     Assessment:  1. Syncope, unspecified syncope type    2. Palpitations    3. PVC's (premature ventricular contractions)    4. Essential hypertension    5. Hyperlipidemia, unspecified hyperlipidemia type    6. Carotid atherosclerosis, unspecified laterality    7. Acute deep vein thrombosis (DVT) of calf muscle vein of left lower extremity         1. Syncope: She was seen in the emergency room in early April 2023 for near syncopal episode; there was concern she had UTI as she has recurrent UTIs.  However ultimately it was felt she had allergic reaction to sweet and low.  She has had no further episodes of syncope.  2. Palpitations: She has a longstanding history of palpitations.  March 2016 Holter monitor showed frequent PVCs.  These are currently controlled.  3. Hypertension: She still needs better control.  4. Hyperlipidemia: She is treated with 20 mg atorvastatin daily.  5. Carotid artery disease: April 2023 carotid artery Doppler showed mild to moderate atheromatous plaque in the right and left carotid arteries.  She is on atorvastatin.  6. Acute DVT: She was diagnosed with left lower extremity DVT and is on 2.5 mg apixaban twice daily.    Ms. Handy is a patient of Dr. Mcmahon's with longstanding history of palpitations and PVCs which are now better controlled.  Her blood pressure has been high of late.  I am going to increase her lisinopril to 40 mg daily.  I will call her in a few weeks to see how her blood pressure is doing.  For now she will keep her December 2023 appointment with Dr. Mcmahon.    No follow-ups on file.  Orders Placed This Encounter   Procedures   • ECG 12 Lead      New Medications Ordered This Visit   Medications   • lisinopril (PRINIVIL,ZESTRIL) 40 MG tablet     Sig: Take 1 tablet by mouth Daily.     Dispense:  30 tablet     Refill:  11         Thank you for the opportunity to participate in this patient's care.    Malorie Davidson,  APRN    This office note has been dictated.

## 2023-05-11 ENCOUNTER — TELEPHONE (OUTPATIENT)
Dept: CARDIOLOGY | Facility: CLINIC | Age: 88
End: 2023-05-11
Payer: MEDICARE

## 2023-05-12 NOTE — TELEPHONE ENCOUNTER
Attempted to call Isabel Handy, no answer.  Left a voicemail for patient to call back.  Will continue to try to reach patient.    Lian Ellis RN  [facility] Triage  05/12/23 10:09 EDT

## 2023-05-12 NOTE — TELEPHONE ENCOUNTER
Can you call to see how her blood pressure is doing on 40 mg lisinopril daily?    Thanks!  ED Tolbert

## 2023-05-15 NOTE — TELEPHONE ENCOUNTER
Patient called back with readings. All of these readings are before her AM meds expect for 2 of them which I will notate which ones they are. I advised her to start checking BP 1-2 hours after AM meds. HRs have been in the 60s-70s.     5/8: 158/81  5/9: 157/87  5/10: 153/75, later after meds it was 124/65  5/11: 144/80  5/12: 141/75  5/13: 127/61 (this was after medications)  5/14: 136/72  5/15: 140/68    Waleska Beltran RN  Triage MG

## 2023-06-05 ENCOUNTER — HOSPITAL ENCOUNTER (OUTPATIENT)
Dept: CARDIOLOGY | Facility: HOSPITAL | Age: 88
Discharge: HOME | End: 2023-06-05
Admitting: INTERNAL MEDICINE
Payer: MEDICARE

## 2023-06-05 DIAGNOSIS — I82.462 ACUTE DEEP VEIN THROMBOSIS (DVT) OF CALF MUSCLE VEIN OF LEFT LOWER EXTREMITY: ICD-10-CM

## 2023-06-05 DIAGNOSIS — D72.820 LYMPHOCYTOSIS: ICD-10-CM

## 2023-06-05 DIAGNOSIS — D75.1 POLYCYTHEMIA: ICD-10-CM

## 2023-06-05 LAB
BH CV LOWER VASCULAR LEFT COMMON FEMORAL AUGMENT: NORMAL
BH CV LOWER VASCULAR LEFT COMMON FEMORAL COMPETENT: NORMAL
BH CV LOWER VASCULAR LEFT COMMON FEMORAL COMPRESS: NORMAL
BH CV LOWER VASCULAR LEFT COMMON FEMORAL PHASIC: NORMAL
BH CV LOWER VASCULAR LEFT COMMON FEMORAL SPONT: NORMAL
BH CV LOWER VASCULAR LEFT DISTAL FEMORAL COMPRESS: NORMAL
BH CV LOWER VASCULAR LEFT GASTRONEMIUS COMPRESS: NORMAL
BH CV LOWER VASCULAR LEFT GREATER SAPH AK COMPRESS: NORMAL
BH CV LOWER VASCULAR LEFT GREATER SAPH BK COMPRESS: NORMAL
BH CV LOWER VASCULAR LEFT LESSER SAPH COMPRESS: NORMAL
BH CV LOWER VASCULAR LEFT MID FEMORAL AUGMENT: NORMAL
BH CV LOWER VASCULAR LEFT MID FEMORAL COMPETENT: NORMAL
BH CV LOWER VASCULAR LEFT MID FEMORAL COMPRESS: NORMAL
BH CV LOWER VASCULAR LEFT MID FEMORAL PHASIC: NORMAL
BH CV LOWER VASCULAR LEFT MID FEMORAL SPONT: NORMAL
BH CV LOWER VASCULAR LEFT PERONEAL COMPRESS: NORMAL
BH CV LOWER VASCULAR LEFT POPLITEAL AUGMENT: NORMAL
BH CV LOWER VASCULAR LEFT POPLITEAL COMPETENT: NORMAL
BH CV LOWER VASCULAR LEFT POPLITEAL COMPRESS: NORMAL
BH CV LOWER VASCULAR LEFT POPLITEAL PHASIC: NORMAL
BH CV LOWER VASCULAR LEFT POPLITEAL SPONT: NORMAL
BH CV LOWER VASCULAR LEFT POSTERIOR TIBIAL COMPRESS: NORMAL
BH CV LOWER VASCULAR LEFT PROFUNDA FEMORAL COMPRESS: NORMAL
BH CV LOWER VASCULAR LEFT PROXIMAL FEMORAL COMPRESS: NORMAL
BH CV LOWER VASCULAR LEFT SAPHENOFEMORAL JUNCTION COMPRESS: NORMAL
BH CV LOWER VASCULAR RIGHT COMMON FEMORAL AUGMENT: NORMAL
BH CV LOWER VASCULAR RIGHT COMMON FEMORAL COMPETENT: NORMAL
BH CV LOWER VASCULAR RIGHT COMMON FEMORAL COMPRESS: NORMAL
BH CV LOWER VASCULAR RIGHT COMMON FEMORAL PHASIC: NORMAL
BH CV LOWER VASCULAR RIGHT COMMON FEMORAL SPONT: NORMAL

## 2023-06-05 PROCEDURE — 93971 EXTREMITY STUDY: CPT

## 2023-06-14 ENCOUNTER — OFFICE VISIT (OUTPATIENT)
Dept: ONCOLOGY | Facility: CLINIC | Age: 88
End: 2023-06-14
Payer: MEDICARE

## 2023-06-14 ENCOUNTER — LAB (OUTPATIENT)
Dept: OTHER | Facility: HOSPITAL | Age: 88
End: 2023-06-14
Payer: MEDICARE

## 2023-06-14 VITALS
BODY MASS INDEX: 30.09 KG/M2 | RESPIRATION RATE: 18 BRPM | OXYGEN SATURATION: 97 % | HEART RATE: 69 BPM | WEIGHT: 163.5 LBS | DIASTOLIC BLOOD PRESSURE: 79 MMHG | TEMPERATURE: 98 F | SYSTOLIC BLOOD PRESSURE: 154 MMHG | HEIGHT: 62 IN

## 2023-06-14 DIAGNOSIS — D75.1 POLYCYTHEMIA: Primary | ICD-10-CM

## 2023-06-14 DIAGNOSIS — I82.462 ACUTE DEEP VEIN THROMBOSIS (DVT) OF CALF MUSCLE VEIN OF LEFT LOWER EXTREMITY: ICD-10-CM

## 2023-06-14 DIAGNOSIS — D72.820 LYMPHOCYTOSIS: ICD-10-CM

## 2023-06-14 DIAGNOSIS — D75.1 POLYCYTHEMIA: ICD-10-CM

## 2023-06-14 LAB
ALBUMIN SERPL-MCNC: 4 G/DL (ref 3.5–5.2)
ALBUMIN/GLOB SERPL: 1.3 G/DL
ALP SERPL-CCNC: 94 U/L (ref 39–117)
ALT SERPL W P-5'-P-CCNC: 14 U/L (ref 1–33)
ANION GAP SERPL CALCULATED.3IONS-SCNC: 13.2 MMOL/L (ref 5–15)
AST SERPL-CCNC: 27 U/L (ref 1–32)
BASOPHILS # BLD AUTO: 0.08 10*3/MM3 (ref 0–0.2)
BASOPHILS NFR BLD AUTO: 0.8 % (ref 0–1.5)
BILIRUB SERPL-MCNC: 0.5 MG/DL (ref 0–1.2)
BUN SERPL-MCNC: 13 MG/DL (ref 8–23)
BUN/CREAT SERPL: 17.3 (ref 7–25)
CALCIUM SPEC-SCNC: 9.8 MG/DL (ref 8.6–10.5)
CHLORIDE SERPL-SCNC: 101 MMOL/L (ref 98–107)
CO2 SERPL-SCNC: 24.8 MMOL/L (ref 22–29)
CREAT SERPL-MCNC: 0.75 MG/DL (ref 0.57–1)
DEPRECATED RDW RBC AUTO: 45.4 FL (ref 37–54)
EGFRCR SERPLBLD CKD-EPI 2021: 78.1 ML/MIN/1.73
EOSINOPHIL # BLD AUTO: 0.13 10*3/MM3 (ref 0–0.4)
EOSINOPHIL NFR BLD AUTO: 1.3 % (ref 0.3–6.2)
ERYTHROCYTE [DISTWIDTH] IN BLOOD BY AUTOMATED COUNT: 13.3 % (ref 12.3–15.4)
GLOBULIN UR ELPH-MCNC: 3.1 GM/DL
GLUCOSE SERPL-MCNC: 97 MG/DL (ref 65–99)
HCT VFR BLD AUTO: 48.3 % (ref 34–46.6)
HGB BLD-MCNC: 15.5 G/DL (ref 12–15.9)
IMM GRANULOCYTES # BLD AUTO: 0.16 10*3/MM3 (ref 0–0.05)
IMM GRANULOCYTES NFR BLD AUTO: 1.6 % (ref 0–0.5)
LDH SERPL-CCNC: 282 U/L (ref 135–214)
LYMPHOCYTES # BLD AUTO: 3.33 10*3/MM3 (ref 0.7–3.1)
LYMPHOCYTES NFR BLD AUTO: 32.9 % (ref 19.6–45.3)
MCH RBC QN AUTO: 29.6 PG (ref 26.6–33)
MCHC RBC AUTO-ENTMCNC: 32.1 G/DL (ref 31.5–35.7)
MCV RBC AUTO: 92.2 FL (ref 79–97)
MONOCYTES # BLD AUTO: 0.54 10*3/MM3 (ref 0.1–0.9)
MONOCYTES NFR BLD AUTO: 5.3 % (ref 5–12)
NEUTROPHILS NFR BLD AUTO: 5.88 10*3/MM3 (ref 1.7–7)
NEUTROPHILS NFR BLD AUTO: 58.1 % (ref 42.7–76)
NRBC BLD AUTO-RTO: 0 /100 WBC (ref 0–0.2)
PLATELET # BLD AUTO: 287 10*3/MM3 (ref 140–450)
PMV BLD AUTO: 11.1 FL (ref 6–12)
POTASSIUM SERPL-SCNC: 4.6 MMOL/L (ref 3.5–5.2)
PROT SERPL-MCNC: 7.1 G/DL (ref 6–8.5)
RBC # BLD AUTO: 5.24 10*6/MM3 (ref 3.77–5.28)
SODIUM SERPL-SCNC: 139 MMOL/L (ref 136–145)
URATE SERPL-MCNC: 4.9 MG/DL (ref 2.4–5.7)
WBC NRBC COR # BLD: 10.12 10*3/MM3 (ref 3.4–10.8)

## 2023-06-14 PROCEDURE — 83615 LACTATE (LD) (LDH) ENZYME: CPT | Performed by: INTERNAL MEDICINE

## 2023-06-14 PROCEDURE — 1126F AMNT PAIN NOTED NONE PRSNT: CPT | Performed by: INTERNAL MEDICINE

## 2023-06-14 PROCEDURE — 99214 OFFICE O/P EST MOD 30 MIN: CPT | Performed by: INTERNAL MEDICINE

## 2023-06-14 PROCEDURE — 1159F MED LIST DOCD IN RCRD: CPT | Performed by: INTERNAL MEDICINE

## 2023-06-14 PROCEDURE — 84550 ASSAY OF BLOOD/URIC ACID: CPT | Performed by: INTERNAL MEDICINE

## 2023-06-14 PROCEDURE — 80053 COMPREHEN METABOLIC PANEL: CPT | Performed by: INTERNAL MEDICINE

## 2023-06-14 PROCEDURE — 85025 COMPLETE CBC W/AUTO DIFF WBC: CPT | Performed by: INTERNAL MEDICINE

## 2023-06-14 PROCEDURE — 36415 COLL VENOUS BLD VENIPUNCTURE: CPT

## 2023-06-14 PROCEDURE — 3078F DIAST BP <80 MM HG: CPT | Performed by: INTERNAL MEDICINE

## 2023-06-14 PROCEDURE — 1160F RVW MEDS BY RX/DR IN RCRD: CPT | Performed by: INTERNAL MEDICINE

## 2023-06-14 PROCEDURE — 3077F SYST BP >= 140 MM HG: CPT | Performed by: INTERNAL MEDICINE

## 2023-06-14 RX ORDER — DIPHENOXYLATE HYDROCHLORIDE AND ATROPINE SULFATE 2.5; .025 MG/1; MG/1
TABLET ORAL DAILY
COMMUNITY

## 2023-06-14 NOTE — PROGRESS NOTES
Subjective     CHIEF COMPLAINT:      Chief Complaint   Patient presents with    Follow-up     Discuss Doppler     HISTORY OF PRESENT ILLNESS:     Isabel Handy is a 87 y.o. female patient who returns today for follow up on her polycythemia and deep vein thrombosis.  She returns today for follow-up reporting improvement in her leg symptoms.  She is taking Eliquis regularly.  She is not having problem with bleeding or bruising.  She switched to a multivitamin that has no iron.  She is watching her intake of iron rich foods.    She reports that before starting Eliquis, she was taking aspirin 81 mg daily.    ROS:  Pertinent ROS is in the HPI.     Past medical, surgical, social and family history were reviewed.     MEDICATIONS:    Current Outpatient Medications:     acetaminophen (TYLENOL) 500 MG tablet, Take 1 tablet by mouth Every 6 (Six) Hours As Needed for Mild Pain., Disp: 30 tablet, Rfl: 0    apixaban (ELIQUIS) 2.5 MG tablet tablet, Take 1 tablet by mouth 2 (Two) Times a Day., Disp: 60 tablet, Rfl: 2    atorvastatin (LIPITOR) 20 MG tablet, TAKE 1 TABLET BY MOUTH EVERY NIGHT, Disp: 90 tablet, Rfl: 1    Calmoseptine 0.44-20.6 % ointment, APPLY AS DIRECTED EVERY EVENING, Disp: , Rfl:     esomeprazole (nexIUM) 40 MG capsule, TAKE 1 CAPSULE BY MOUTH EVERY MORNING BEFORE BREAKFAST, Disp: 90 capsule, Rfl: 0    levothyroxine (SYNTHROID, LEVOTHROID) 75 MCG tablet, TAKE 1 TABLET BY MOUTH DAILY, Disp: 90 tablet, Rfl: 1    lisinopril (PRINIVIL,ZESTRIL) 40 MG tablet, Take 1 tablet by mouth Daily., Disp: 30 tablet, Rfl: 11    multivitamin (MULTIVITAMIN PO), Take  by mouth Daily., Disp: , Rfl:     nitrofurantoin, macrocrystal-monohydrate, (MACROBID) 100 MG capsule, Take 1 capsule by mouth 2 (Two) Times a Day. (Patient taking differently: Take 1 capsule by mouth Daily.), Disp: 14 capsule, Rfl: 0    phenazopyridine (PYRIDIUM) 200 MG tablet, Take 1 tablet by mouth 3 (Three) Times a Day As Needed for Bladder Spasms., Disp: 6  "tablet, Rfl: 0    Probiotic Product (SOLUBLE FIBER/PROBIOTICS) chewable tablet, Chew 1 each Daily., Disp: , Rfl:     VITAMIN D PO, Take  by mouth., Disp: , Rfl:     amitriptyline (ELAVIL) 25 MG tablet, Take 1 tablet by mouth Every Night. (Patient not taking: Reported on 6/14/2023), Disp: 90 tablet, Rfl: 1  Objective     VITAL SIGNS:     Vitals:    06/14/23 1332   BP: 154/79   Pulse: 69   Resp: 18   Temp: 98 °F (36.7 °C)   TempSrc: Temporal   SpO2: 97%   Weight: 74.2 kg (163 lb 8 oz)   Height: 158 cm (62.21\")  Comment: new ht   PainSc: 0-No pain     Body mass index is 29.71 kg/m².     Wt Readings from Last 5 Encounters:   06/14/23 74.2 kg (163 lb 8 oz)   04/28/23 73 kg (161 lb)   04/25/23 72.1 kg (159 lb)   03/24/23 72.1 kg (159 lb)   03/20/23 73.5 kg (162 lb)     PHYSICAL EXAMINATION:   GENERAL: The patient appears in good general condition, not in acute distress.   SKIN: No ecchymosis.  EYES: No jaundice. No pallor.  CHEST: Normal respiratory effort.   CVS: No edema.  ABDOMEN: Nondistended.  EXTREMITIES: Prominent superficial veins in the left lower extremity posterior and lateral aspect.  No signs of thrombosis.  No calf tenderness.  No overlying erythema or warmth.    DIAGNOSTIC DATA:     Results from last 7 days   Lab Units 06/14/23  1326   WBC 10*3/mm3 10.12   NEUTROS ABS 10*3/mm3 5.88   HEMOGLOBIN g/dL 15.5   HEMATOCRIT % 48.3*   PLATELETS 10*3/mm3 287     Results from last 7 days   Lab Units 06/14/23  1326   SODIUM mmol/L 139   POTASSIUM mmol/L 4.6   CHLORIDE mmol/L 101   CO2 mmol/L 24.8   BUN mg/dL 13   CREATININE mg/dL 0.75   CALCIUM mg/dL 9.8   ALBUMIN g/dL 4.0   BILIRUBIN mg/dL 0.5   ALK PHOS U/L 94   ALT (SGPT) U/L 14   AST (SGOT) U/L 27   GLUCOSE mg/dL 97     Component      Latest Ref Rng 3/15/2023 6/14/2023   LDH      135 - 214 U/L 237 (H)  282 (H)    Uric Acid      2.4 - 5.7 mg/dL 5.1  4.9       Venous Doppler lower extremity on 6/5/2023:  Normal left lower extremity venous duplex scan.     Assessment " & Plan    *Polycythemia.    It was identified in the labs dating back to 2015.    Patient reports being told that she needed to donate blood but she never did so.    Hemoglobin was 16.3 with a hematocrit of 49.3% on 11/24/2021.    Patient does not have underlying lung disease or sleep apnea.  She does not smoke.    Testing for JAK2 V617F mutation, JAK2 exam 12-15 mutation, CALReticulin and MPL was negative.  Patient was suspected of having an element of hemoconcentration.  We advised the patient to increase fluid intake and cut down on intake of iron rich food.  Hemoglobin decreased to 15.1 and hematocrit decreased to 47% today.  hemoglobin increased to 16.3 on 1/26/2022.  Hematocrit was 49.3%.   We advised the patient to maintain adequate hydration and reduce intake of iron rich food.  8/10/2022: Hemoglobin improved to 15.0 and hematocrit improved to 47.8%.  3/31/2023: Hemoglobin 15.4 and hematocrit of 50.2%.  Patient reported that she does not drink enough fluids regularly.  Patient had a new DVT in the left leg that was diagnosed in early March 2023.  I explained that the DVT may have been in part secondary to the polycythemia and this may be an indicator of an underlying polycythemia vera.  3/31/2023-this revealed no evidence of JAK2, MPL or PEGGY reticulin mutation.  Patient increased her fluid intake.  She switched to a multivitamin with no iron.  6/14/2023: Hemoglobin 15.5.  Hematocrit 48.3%.    *Lymphocytosis.    Lymphocyte count increased to 3600 on 5/17/2018 and has been on the high side since that time.    Lymphocyte count was 3850 on 11/24/2021.    Peripheral blood flow cytometry on 11/24/2021 revealed kappa restricted B cells.  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    This may also be attributed to underlying reactive process.   Total WBC increased to 12,940 on 8/10/2022 with increase in the neutrophil count to 8,150. This is attributed to a recent UTI.   3/31/2023:  Lymphocyte count was 4210.  6/14/2023: Lymphocyte count improved to 3330.    *Left calf DVT.  Patient developed pain in the left leg on 3/4/2023 and had difficulty putting weight on it.    Venous Doppler on 3/5/2023 revealed no flow and no compressibility within the left anterior tibial vein.  The DVT developed after she had a fall and injury to the left leg resulting in decreased mobility.  Her leg was in a boot.  The DVT is therefore considered provoked episode.  She is tolerating Eliquis currently at 2.5 mg twice daily.  The leg pain decreased after she started anticoagulation.   She has prominent superficial veins in the left lower extremity but with no signs of thrombosis.  Since the DVT was a calf vein DVT, I recommended 3 months of anticoagulation.  Venous Doppler in 6/5/2023 showed resolution of the DVT.    She is reporting improvement in her leg symptoms.  I recommended continuing Eliquis to complete 3 months and then to switch to aspirin 81 mg daily.    PLAN:    1.  Continue Eliquis 2.5 mg twice daily until she completes her current supply.  After that, I asked her to take aspirin 81 mg daily.   2.  Maintain adequate hydration.   3.  Reduce intake of iron rich food.   4.  Follow-up in 6 months.  CBC CMP LDH uric acid and erythropoietin levels will be obtained.         Devan Salmeron MD  06/14/23

## 2023-07-24 ENCOUNTER — TELEPHONE (OUTPATIENT)
Dept: INTERNAL MEDICINE | Facility: CLINIC | Age: 88
End: 2023-07-24

## 2023-07-24 PROBLEM — Z86.73 HISTORY OF CVA (CEREBROVASCULAR ACCIDENT): Status: ACTIVE | Noted: 2023-07-24

## 2023-07-24 NOTE — TELEPHONE ENCOUNTER
Called and spoke with pt she does not have pneumonia but she does need a follow up if you could call her to schedule

## 2023-07-24 NOTE — TELEPHONE ENCOUNTER
Caller: Isabel Handy    Relationship: Self    Best call back number: [phone]    What is the best time to reach you: ANYTIME    Who are you requesting to speak with (clinical staff, provider,  specific staff member): CLINICAL STAFF    Do you know the name of the person who called: PATIENT     What was the call regarding: PATIENT SAID SOMEONE CALLED AND TOLD HER SHE HAD PNEUMONIA THIS MORNING AFTER SHE HAD BEEN IN THE HOSPITAL OVER THE WEEKEND, BUT SHE SAID THE HOSPITAL IN [place] HAD TOLD HER EVERYTHING WAS FINE    SHE SAID THE PERSON WHO SPOKE TO HER SAID THEY WOULD TALK TO ZAHEER MARIEE AND GET BACK TO HER BUT SHE SAID SHE HAD NOT HEARD ANYTHING FURTHER     Is it okay if the provider responds through MyChart: NO

## 2023-07-25 ENCOUNTER — OFFICE VISIT (OUTPATIENT)
Dept: INTERNAL MEDICINE | Facility: CLINIC | Age: 88
End: 2023-07-25
Payer: MEDICARE

## 2023-07-25 VITALS
DIASTOLIC BLOOD PRESSURE: 58 MMHG | WEIGHT: 164.9 LBS | OXYGEN SATURATION: 97 % | HEART RATE: 72 BPM | BODY MASS INDEX: 30.35 KG/M2 | HEIGHT: 62 IN | SYSTOLIC BLOOD PRESSURE: 122 MMHG

## 2023-07-25 DIAGNOSIS — N95.2 VAGINAL ATROPHY: ICD-10-CM

## 2023-07-25 DIAGNOSIS — E87.1 HYPONATREMIA: Primary | ICD-10-CM

## 2023-07-25 RX ORDER — ESTRADIOL 0.1 MG/G
1 CREAM VAGINAL 2 TIMES WEEKLY
Qty: 42.5 G | Refills: 6 | Status: SHIPPED | OUTPATIENT
Start: 2023-07-27

## 2023-07-25 NOTE — PROGRESS NOTES
Transitional Care Follow Up Visit  Subjective     Isabel Handy is a 87 y.o. female who presents for a transitional care management visit.    Within 48 business hours after discharge our office contacted her via telephone to coordinate her care and needs.      I reviewed and discussed the details of that call along with the discharge summary, hospital problems, inpatient lab results, inpatient diagnostic studies, and consultation reports with Isabel.     Current outpatient and discharge medications have been reconciled for the patient.  Reviewed by: Kacie Burgess MA           No data to display              Risk for Readmission (LACE)   No data recorded    History of Present Illness   Course During Hospital Stay:  ***     {Common H&P Review Areas:57837}    Current Outpatient Medications:     acetaminophen (TYLENOL) 500 MG tablet, Take 1 tablet by mouth Every 6 (Six) Hours As Needed for Mild Pain., Disp: 30 tablet, Rfl: 0    amitriptyline (ELAVIL) 25 MG tablet, Take 1 tablet by mouth Every Night., Disp: 90 tablet, Rfl: 1    aspirin 81 MG chewable tablet, Chew 1 tablet Daily., Disp: , Rfl:     atorvastatin (LIPITOR) 20 MG tablet, TAKE 1 TABLET BY MOUTH EVERY NIGHT, Disp: 90 tablet, Rfl: 1    Calmoseptine 0.44-20.6 % ointment, APPLY AS DIRECTED EVERY EVENING, Disp: , Rfl:     cephalexin (KEFLEX) 500 MG capsule, Take 1 capsule by mouth 3 (Three) Times a Day for 30 doses., Disp: 30 capsule, Rfl: 0    esomeprazole (nexIUM) 40 MG capsule, Take 1 capsule by mouth Every Morning Before Breakfast., Disp: 90 capsule, Rfl: 0    levothyroxine (SYNTHROID, LEVOTHROID) 75 MCG tablet, TAKE 1 TABLET BY MOUTH DAILY, Disp: 90 tablet, Rfl: 1    lisinopril (PRINIVIL,ZESTRIL) 40 MG tablet, Take 1 tablet by mouth Daily., Disp: 30 tablet, Rfl: 11    multivitamin (THERAGRAN) tablet tablet, Take  by mouth Daily., Disp: , Rfl:     phenazopyridine (PYRIDIUM) 200 MG tablet, Take 1 tablet by mouth 3 (Three) Times a Day As Needed for Bladder  Spasms., Disp: 6 tablet, Rfl: 0    Probiotic Product (SOLUBLE FIBER/PROBIOTICS) chewable tablet, Chew 1 each Daily., Disp: , Rfl:     VITAMIN D PO, Take  by mouth., Disp: , Rfl:     apixaban (ELIQUIS) 2.5 MG tablet tablet, Take 1 tablet by mouth 2 (Two) Times a Day. (Patient not taking: Reported on 7/25/2023), Disp: 60 tablet, Rfl: 2    nitrofurantoin, macrocrystal-monohydrate, (MACROBID) 100 MG capsule, Take 1 capsule by mouth 2 (Two) Times a Day. (Patient not taking: Reported on 7/25/2023), Disp: 14 capsule, Rfl: 0   Review of Systems    Objective   Physical Exam    Assessment & Plan   {Assess/PlanSmartLinks:61869}

## 2023-07-25 NOTE — PROGRESS NOTES
"Subjective   Isabel Handy is a 87 y.o. female.      History of Present Illness   The patient is here today to F/U [facility] Was observed overnight. Patient presented to ER with headache and generalized shaking.  Patient had new onset posterior headache described as throbbing, moderate.  Some generalized shaking/tremors and chills.  Felt weak.  Patient was given IV Toradol with resolution of headache.  Patient was found to be hyponatremic.  Recheck UA showed cloudy urine otherwise negative.  Hyponatremia was felt likely to be due to dehydration from recent UTI.  Her sodium improved with IV fluids.  CT of the head was negative acute.  Chronic small vessel ischemic changes.  Small chronic lacunar infarct in the right putamen.  Chest x-ray showed mild opacity right lower lobe.  CT of the chest negative acute.  Small sliding-type hiatal hernia.  Subtle morphologic check changes of cirrhosis.    She did fall after being at the ER. She was vacuuming and forgot a stool was behind her. Fell on her tail bone. Now with moderate intermittent tail bone pain, defers imaging today.     Was seen at [facility] 7/17th for dysuria, Rx'd keflex. Neg urine culture.     \"I feel a lot better.\"   Still with problems with urinary tract. She feels urethral discomfort and pressure.   The following portions of the patient's history were reviewed and updated as appropriate: allergies, current medications, past family history, past medical history, past social history, past surgical history and problem list.    Review of Systems   Constitutional: Negative.    Respiratory: Negative.     Cardiovascular: Negative.    Neurological:  Negative for tremors, weakness and headache.     Objective   Physical Exam  Constitutional:       Appearance: Normal appearance. She is well-developed.   Neck:      Thyroid: No thyromegaly.   Cardiovascular:      Rate and Rhythm: Normal rate and regular rhythm.      Heart sounds: Normal heart sounds. "   Pulmonary:      Effort: Pulmonary effort is normal.      Breath sounds: Normal breath sounds.   Musculoskeletal:      Cervical back: Normal range of motion and neck supple.   Lymphadenopathy:      Cervical: No cervical adenopathy.   Skin:     General: Skin is warm and dry.   Neurological:      Mental Status: She is alert.   Psychiatric:         Behavior: Behavior normal.         Thought Content: Thought content normal.         Judgment: Judgment normal.       Vitals:    07/25/23 1550   BP: 122/58   Pulse: 72   SpO2: 97%     Body mass index is 30.15 kg/m².      Assessment & Plan   Diagnoses and all orders for this visit:    1. Hyponatremia (Primary)  -     Basic Metabolic Panel    2. Vaginal atrophy  -     estradiol (ESTRACE VAGINAL) 0.1 MG/GM vaginal cream; Insert 1 g into the vagina 2 (Two) Times a Week.  Dispense: 42.5 g; Refill: 6               1. Hyponatremia- recheck today, she will continue 1 electrolyte water a day.   2. Urethral discomfort/vaginal atrophy- will send Rx for vaginal estrogen

## 2023-07-26 ENCOUNTER — LAB (OUTPATIENT)
Dept: LAB | Facility: HOSPITAL | Age: 88
End: 2023-07-26
Payer: MEDICARE

## 2023-07-26 LAB
ANION GAP SERPL CALCULATED.3IONS-SCNC: 10.8 MMOL/L (ref 5–15)
BUN SERPL-MCNC: 14 MG/DL (ref 8–23)
BUN/CREAT SERPL: 16.7 (ref 7–25)
CALCIUM SPEC-SCNC: 9.8 MG/DL (ref 8.6–10.5)
CHLORIDE SERPL-SCNC: 101 MMOL/L (ref 98–107)
CO2 SERPL-SCNC: 24.2 MMOL/L (ref 22–29)
CREAT SERPL-MCNC: 0.84 MG/DL (ref 0.57–1)
EGFRCR SERPLBLD CKD-EPI 2021: 68.2 ML/MIN/1.73
GLUCOSE SERPL-MCNC: 90 MG/DL (ref 65–99)
POTASSIUM SERPL-SCNC: 4.4 MMOL/L (ref 3.5–5.2)
SODIUM SERPL-SCNC: 136 MMOL/L (ref 136–145)

## 2023-07-26 PROCEDURE — 36415 COLL VENOUS BLD VENIPUNCTURE: CPT | Performed by: NURSE PRACTITIONER

## 2023-07-26 PROCEDURE — 80048 BASIC METABOLIC PNL TOTAL CA: CPT | Performed by: NURSE PRACTITIONER

## 2023-09-26 ENCOUNTER — OFFICE VISIT (OUTPATIENT)
Dept: INTERNAL MEDICINE | Facility: CLINIC | Age: 88
End: 2023-09-26
Payer: MEDICARE

## 2023-09-26 VITALS
WEIGHT: 162 LBS | HEART RATE: 73 BPM | HEIGHT: 63 IN | OXYGEN SATURATION: 99 % | SYSTOLIC BLOOD PRESSURE: 122 MMHG | BODY MASS INDEX: 28.7 KG/M2 | DIASTOLIC BLOOD PRESSURE: 68 MMHG

## 2023-09-26 DIAGNOSIS — E03.9 HYPOTHYROIDISM, UNSPECIFIED TYPE: ICD-10-CM

## 2023-09-26 DIAGNOSIS — E55.9 VITAMIN D DEFICIENCY: ICD-10-CM

## 2023-09-26 DIAGNOSIS — E78.5 HYPERLIPIDEMIA, UNSPECIFIED HYPERLIPIDEMIA TYPE: ICD-10-CM

## 2023-09-26 DIAGNOSIS — Z00.00 MEDICARE ANNUAL WELLNESS VISIT, SUBSEQUENT: Primary | ICD-10-CM

## 2023-09-26 DIAGNOSIS — R73.03 PREDIABETES: ICD-10-CM

## 2023-09-26 DIAGNOSIS — I10 ESSENTIAL HYPERTENSION: ICD-10-CM

## 2023-09-26 PROCEDURE — 1160F RVW MEDS BY RX/DR IN RCRD: CPT | Performed by: NURSE PRACTITIONER

## 2023-09-26 PROCEDURE — 1159F MED LIST DOCD IN RCRD: CPT | Performed by: NURSE PRACTITIONER

## 2023-09-26 PROCEDURE — 3074F SYST BP LT 130 MM HG: CPT | Performed by: NURSE PRACTITIONER

## 2023-09-26 PROCEDURE — G0439 PPPS, SUBSEQ VISIT: HCPCS | Performed by: NURSE PRACTITIONER

## 2023-09-26 PROCEDURE — 3078F DIAST BP <80 MM HG: CPT | Performed by: NURSE PRACTITIONER

## 2023-09-26 PROCEDURE — 1170F FXNL STATUS ASSESSED: CPT | Performed by: NURSE PRACTITIONER

## 2023-09-26 RX ORDER — LISINOPRIL 30 MG/1
1 TABLET ORAL DAILY
COMMUNITY
Start: 2023-09-21

## 2023-09-26 RX ORDER — NITROFURANTOIN 25; 75 MG/1; MG/1
1 CAPSULE ORAL DAILY
COMMUNITY
Start: 2023-09-20

## 2023-09-26 NOTE — PROGRESS NOTES
The ABCs of the Annual Wellness Visit  Subsequent Medicare Wellness Visit    Subjective    Isabel Handy is a 87 y.o. female who presents for a Subsequent Medicare Wellness Visit.    The following portions of the patient's history were reviewed and   updated as appropriate: allergies, current medications, past family history, past medical history, past social history, past surgical history, and problem list.    Compared to one year ago, the patient feels her physical   health is the same.  Other than vein pain.     Compared to one year ago, the patient feels her mental   health is the same.    Recent Hospitalizations:  She was admitted to the hospital during the last year. In [facility].       Current Medical Providers:  Patient Care Team:  Beth Figueredo APRN as PCP - General (Family Medicine)  Beth Figueredo APRN as PCP - Internal Medicine (Internal Medicine)  Beth Figueredo APRN as Referring Physician (Family Medicine)  Devan Salmeron MD as Consulting Physician (Hematology and Oncology)    Outpatient Medications Prior to Visit   Medication Sig Dispense Refill    acetaminophen (TYLENOL) 500 MG tablet Take 1 tablet by mouth Every 6 (Six) Hours As Needed for Mild Pain. 30 tablet 0    amitriptyline (ELAVIL) 25 MG tablet Take 1 tablet by mouth Every Night. 90 tablet 1    aspirin 81 MG chewable tablet Chew 1 tablet Daily.      atorvastatin (LIPITOR) 20 MG tablet TAKE 1 TABLET BY MOUTH EVERY NIGHT 90 tablet 1    Calmoseptine 0.44-20.6 % ointment APPLY AS DIRECTED EVERY EVENING      esomeprazole (nexIUM) 40 MG capsule Take 1 capsule by mouth Every Morning Before Breakfast. 90 capsule 0    estradiol (ESTRACE VAGINAL) 0.1 MG/GM vaginal cream Insert 1 g into the vagina 2 (Two) Times a Week. 42.5 g 6    levothyroxine (SYNTHROID, LEVOTHROID) 75 MCG tablet TAKE 1 TABLET BY MOUTH DAILY 90 tablet 1    lisinopril (PRINIVIL,ZESTRIL) 30 MG tablet Take 1 tablet by mouth Daily.      multivitamin (THERAGRAN) tablet tablet  Take  by mouth Daily.      nitrofurantoin, macrocrystal-monohydrate, (MACROBID) 100 MG capsule Take 1 capsule by mouth Daily.      Probiotic Product (SOLUBLE FIBER/PROBIOTICS) chewable tablet Chew 1 each Daily.      VITAMIN D PO Take  by mouth.      lisinopril (PRINIVIL,ZESTRIL) 40 MG tablet Take 1 tablet by mouth Daily. 30 tablet 11    predniSONE (DELTASONE) 10 MG (21) dose pack Take  by mouth Daily. Use as directed on package (Patient not taking: Reported on 9/26/2023) 21 each 0     No facility-administered medications prior to visit.       No opioid medication identified on active medication list. I have reviewed chart for other potential  high risk medication/s and harmful drug interactions in the elderly.        Aspirin is on active medication list. Aspirin use is indicated based on review of current medical condition/s. Pros and cons of this therapy have been discussed today. Benefits of this medication outweigh potential harm.  Patient has been encouraged to continue taking this medication.  .      Patient Active Problem List   Diagnosis    Gastroesophageal reflux disease    Menopausal syndrome    Osteoarthritis of cervical spine    Carotid atherosclerosis    Prediabetes    Low back pain    Insomnia    Hypothyroidism    Essential hypertension    Hyperlipidemia    Allergic rhinitis    Palpitations    Anxiety    PVC's (premature ventricular contractions)    Vitamin D deficiency    Lung nodule    Hyperglycemia    Occipital pain    Malaise and fatigue    Acute nonintractable headache    Elevated hematocrit    Posterior subcapsular age-related cataract of both eyes    Dry eye syndrome of bilateral lacrimal glands    Hearing loss    Nausea    Elevated hemoglobin    Hard of hearing    Acute deep vein thrombosis (DVT) of calf muscle vein of left lower extremity    History of CVA (cerebrovascular accident)     Advance Care Planning   Advance Care Planning     Advance Directive is not on file.  ACP discussion was held  "with the patient during this visit. Patient does not have an advance directive, declines further assistance.     Objective    Vitals:    23 1330   BP: 122/68   Pulse: 73   SpO2: 99%   Weight: 73.5 kg (162 lb)   Height: 160 cm (62.99\")     Estimated body mass index is 28.7 kg/m² as calculated from the following:    Height as of this encounter: 160 cm (62.99\").    Weight as of this encounter: 73.5 kg (162 lb).           Does the patient have evidence of cognitive impairment? No          HEALTH RISK ASSESSMENT    Smoking Status:  Social History     Tobacco Use   Smoking Status Former    Packs/day: 0.25    Years: 2.00    Pack years: 0.50    Types: Cigarettes    Quit date: 10/10/1994    Years since quittin.9    Passive exposure: Past   Smokeless Tobacco Never     Alcohol Consumption:  Social History     Substance and Sexual Activity   Alcohol Use Never    Alcohol/week: 1.0 standard drink    Types: 1 Glasses of wine per week     Fall Risk Screen:    QUETA Fall Risk Assessment was completed, and patient is at LOW risk for falls.Assessment completed on:2023    Depression Screenin/26/2023     1:32 PM   PHQ-2/PHQ-9 Depression Screening   Little Interest or Pleasure in Doing Things 0-->not at all   Feeling Down, Depressed or Hopeless 0-->not at all   PHQ-9: Brief Depression Severity Measure Score 0       Health Habits and Functional and Cognitive Screenin/26/2023     1:32 PM   Functional & Cognitive Status   Do you have difficulty preparing food and eating? No   Do you have difficulty bathing yourself, getting dressed or grooming yourself? No   Do you have difficulty using the toilet? No   Do you have difficulty moving around from place to place? No   Do you have trouble with steps or getting out of a bed or a chair? No   Current Diet Well Balanced Diet   Dental Exam Up to date   Eye Exam Up to date   Exercise (times per week) 7 times per week   Current Exercises Include Walking   Do you need " help using the phone?  No   Are you deaf or do you have serious difficulty hearing?  Yes   Do you need help to go to places out of walking distance? No   Do you need help shopping? No   Do you need help preparing meals?  No   Do you need help with housework?  No   Do you need help with laundry? No   Do you need help taking your medications? No   Do you need help managing money? No   Do you ever drive or ride in a car without wearing a seat belt? No       Age-appropriate Screening Schedule:  Refer to the list below for future screening recommendations based on patient's age, sex and/or medical conditions. Orders for these recommended tests are listed in the plan section. The patient has been provided with a written plan.    Health Maintenance   Topic Date Due    COVID-19 Vaccine (4 - Moderna series) 01/05/2022    BMI FOLLOWUP  09/15/2023    COLORECTAL CANCER SCREENING  09/21/2023    ZOSTER VACCINE (1 of 2) 09/26/2023 (Originally 11/27/1985)    INFLUENZA VACCINE  10/01/2023    LIPID PANEL  03/20/2024    ANNUAL WELLNESS VISIT  09/26/2024    DXA SCAN  10/27/2027    TDAP/TD VACCINES (2 - Td or Tdap) 10/08/2032    Pneumococcal Vaccine 65+  Completed                  CMS Preventative Services Quick Reference  Risk Factors Identified During Encounter  Immunizations Discussed/Encouraged: Influenza, Shingrix, and COVID19  Inactivity/Sedentary: Patient was advised to exercise at least 150 minutes a week per CDC recommendations.  The above risks/problems have been discussed with the patient.  Pertinent information has been shared with the patient in the After Visit Summary.  An After Visit Summary and PPPS were made available to the patient.    Follow Up:   Next Medicare Wellness visit to be scheduled in 1 year.       Additional E&M Note during same encounter follows:  Patient has multiple medical problems which are significant and separately identifiable that require additional work above and beyond the Medicare Wellness  "Visit.      Chief Complaint  Medicare Wellness-subsequent    Subjective        HPI  Isabel Handy is also being seen today for lab follow up.     HTN-Pt is doing well with current medication regimen, denies adverse reactions, compliant with medication schedule.   Hypothyroidism-Pt is doing well with current medication regimen, denies adverse reactions, compliant with medication schedule.  Hyperlipidemia-Pt is doing well with current medication regimen, denies adverse reactions, compliant with medication schedule.     Seeing urology regarding mult UTIs. She has an appointment with GYN soon. Vaginal estrogen is maybe helpful.     Niurka just got .     Review of Systems   Constitutional: Negative.    Respiratory: Negative.     Cardiovascular:  Positive for leg swelling (occ). Negative for chest pain and palpitations.   Psychiatric/Behavioral:  Positive for sleep disturbance. Negative for dysphoric mood and suicidal ideas. The patient is not nervous/anxious.      Objective   Vital Signs:  /68   Pulse 73   Ht 160 cm (62.99\")   Wt 73.5 kg (162 lb)   SpO2 99%   BMI 28.70 kg/m²     Physical Exam  Constitutional:       Appearance: Normal appearance. She is well-developed.   Neck:      Thyroid: No thyromegaly.   Cardiovascular:      Rate and Rhythm: Normal rate and regular rhythm.      Heart sounds: Normal heart sounds.   Pulmonary:      Effort: Pulmonary effort is normal.      Breath sounds: Normal breath sounds.   Musculoskeletal:      Cervical back: Normal range of motion and neck supple.   Lymphadenopathy:      Cervical: No cervical adenopathy.   Skin:     General: Skin is warm and dry.   Neurological:      Mental Status: She is alert.   Psychiatric:         Behavior: Behavior normal.         Thought Content: Thought content normal.         Judgment: Judgment normal.        The following data was reviewed by: ED Morris on 09/26/2023:  Common labs          3/20/2023    12:16 6/14/2023    13:26 " 7/26/2023    10:58   Common Labs   Glucose 97  97  90    BUN 12  13  14    Creatinine 0.75  0.75  0.84    Sodium 139  139  136    Potassium 4.3  4.6  4.4    Chloride 104  101  101    Calcium 9.6  9.8  9.8    Albumin 4.2  4.0     Total Bilirubin 0.7  0.5     Alkaline Phosphatase 91  94     AST (SGOT) 20  27     ALT (SGPT) 13  14     WBC 10.14  10.12     Hemoglobin 15.6  15.5     Hematocrit 49.0  48.3     Platelets 248  287     Total Cholesterol 202      Triglycerides 85      HDL Cholesterol 91      LDL Cholesterol  96      Uric Acid  4.9       Data reviewed : labs           Assessment and Plan   Diagnoses and all orders for this visit:    1. Medicare annual wellness visit, subsequent (Primary)    2. Essential hypertension  -     Comprehensive Metabolic Panel; Future  -     Lipid Panel With LDL / HDL Ratio; Future  -     TSH Rfx On Abnormal To Free T4; Future  -     Hemoglobin A1c; Future  -     Vitamin D,25-Hydroxy; Future    3. Hyperlipidemia, unspecified hyperlipidemia type  -     Comprehensive Metabolic Panel; Future  -     Lipid Panel With LDL / HDL Ratio; Future  -     TSH Rfx On Abnormal To Free T4; Future  -     Hemoglobin A1c; Future  -     Vitamin D,25-Hydroxy; Future    4. Hypothyroidism, unspecified type  -     Comprehensive Metabolic Panel; Future  -     Lipid Panel With LDL / HDL Ratio; Future  -     TSH Rfx On Abnormal To Free T4; Future  -     Hemoglobin A1c; Future  -     Vitamin D,25-Hydroxy; Future    5. Vitamin D deficiency  -     Comprehensive Metabolic Panel; Future  -     Lipid Panel With LDL / HDL Ratio; Future  -     TSH Rfx On Abnormal To Free T4; Future  -     Hemoglobin A1c; Future  -     Vitamin D,25-Hydroxy; Future    6. Prediabetes  -     Comprehensive Metabolic Panel; Future  -     Lipid Panel With LDL / HDL Ratio; Future  -     TSH Rfx On Abnormal To Free T4; Future  -     Hemoglobin A1c; Future  -     Vitamin D,25-Hydroxy; Future        HTN- well controlled  Hypothyroidism- will check  next year   Hyperlipidemia- will check early next year  Frequent UTIs- she would like a urogyn referral, she can try a D Mannose supplement   Painful varicosity- will place vascular referral in as this has become chronic  Insomnia- try melatonin or benadryl  Discussed c-scope     I spent 29 minutes caring for Isabel on this date of service. This time includes time spent by me in the following activities:preparing for the visit, reviewing tests, performing a medically appropriate examination and/or evaluation , counseling and educating the patient/family/caregiver, documenting information in the medical record, and independently interpreting results and communicating that information with the patient/family/caregiver  Follow Up   Return in about 6 months (around 3/26/2024).  Patient was given instructions and counseling regarding her condition or for health maintenance advice. Please see specific information pulled into the AVS if appropriate.

## 2023-10-23 RX ORDER — NITROFURANTOIN 25; 75 MG/1; MG/1
CAPSULE ORAL
Qty: 10 CAPSULE | OUTPATIENT
Start: 2023-10-23

## 2023-10-25 RX ORDER — ESOMEPRAZOLE MAGNESIUM 40 MG/1
40 CAPSULE, DELAYED RELEASE ORAL
Qty: 90 CAPSULE | Refills: 1 | Status: SHIPPED | OUTPATIENT
Start: 2023-10-25

## 2023-10-30 ENCOUNTER — OFFICE VISIT (OUTPATIENT)
Dept: INTERNAL MEDICINE | Facility: CLINIC | Age: 88
End: 2023-10-30
Payer: MEDICARE

## 2023-10-30 VITALS
SYSTOLIC BLOOD PRESSURE: 152 MMHG | BODY MASS INDEX: 28.35 KG/M2 | HEIGHT: 63 IN | DIASTOLIC BLOOD PRESSURE: 82 MMHG | HEART RATE: 85 BPM | WEIGHT: 160 LBS | OXYGEN SATURATION: 96 % | TEMPERATURE: 98.3 F

## 2023-10-30 DIAGNOSIS — I10 ESSENTIAL HYPERTENSION: Primary | ICD-10-CM

## 2023-10-30 RX ORDER — HYDROCHLOROTHIAZIDE 12.5 MG/1
12.5 TABLET ORAL DAILY
Qty: 30 TABLET | Refills: 6 | Status: SHIPPED | OUTPATIENT
Start: 2023-10-30 | End: 2023-10-30

## 2023-10-30 RX ORDER — LISINOPRIL 40 MG/1
1 TABLET ORAL DAILY
COMMUNITY
Start: 2023-10-22

## 2023-10-30 RX ORDER — AMLODIPINE BESYLATE 5 MG/1
5 TABLET ORAL DAILY
Qty: 30 TABLET | Refills: 6 | Status: SHIPPED | OUTPATIENT
Start: 2023-10-30

## 2023-10-30 NOTE — PROGRESS NOTES
Subjective   Isabel Handy is a 87 y.o. female.      History of Present Illness   The patient is here today to F/U on ER visit at [facility] in [place] on 10/22/2023. Went to ER for headache and HTN. When she woke up she was not feeling well. CT head neg, lab work neg, increase lisinopril from 30-40 mg daily.     BP at home is running 150-160/70s. She has had her cuff checked for accuracy. Not sitting for 5 minutes.     The following portions of the patient's history were reviewed and updated as appropriate: allergies, current medications, past family history, past medical history, past social history, past surgical history and problem list.    Review of Systems   Constitutional:  Negative for chills and fever.   Eyes: Negative.    Respiratory: Negative.     Cardiovascular: Negative.    Neurological: Negative.    Psychiatric/Behavioral:  Negative for dysphoric mood and suicidal ideas. The patient is not nervous/anxious.        Objective   Physical Exam  Constitutional:       Appearance: Normal appearance. She is well-developed.   Neck:      Thyroid: No thyromegaly.   Cardiovascular:      Rate and Rhythm: Normal rate and regular rhythm.      Heart sounds: Normal heart sounds.   Pulmonary:      Effort: Pulmonary effort is normal.      Breath sounds: Normal breath sounds.   Musculoskeletal:      Cervical back: Normal range of motion and neck supple.   Lymphadenopathy:      Cervical: No cervical adenopathy.   Skin:     General: Skin is warm and dry.   Neurological:      Mental Status: She is alert.   Psychiatric:         Behavior: Behavior normal.         Thought Content: Thought content normal.         Judgment: Judgment normal.         Vitals:    10/30/23 1544   BP: 152/82   Pulse:    Temp:    SpO2:      Body mass index is 28.35 kg/m².      Assessment & Plan   Diagnoses and all orders for this visit:    1. Essential hypertension (Primary)  -     Basic Metabolic Panel; Future  -     amLODIPine (NORVASC) 5 MG tablet;  Take 1 tablet by mouth Daily.  Dispense: 30 tablet; Refill: 6    Other orders  -     Discontinue: hydroCHLOROthiazide (HYDRODIURIL) 12.5 MG tablet; Take 1 tablet by mouth Daily.  Dispense: 30 tablet; Refill: 6               1. HTN- DASH diet, no sudafed, limit caffeine, (she only drinks 1 cup of coffee a day), continue to monitor BP (sit for 5 min first), discussed HCTZ but will not use due to hx of hyponatremia. Add on amlodipine 5mg nightly. Send BP numbers in 2 weeks.

## 2023-10-30 NOTE — PATIENT INSTRUCTIONS
1. HTN- DASH diet, no sudafed, limit caffeine, (she only drinks 1 cup of coffee a day), continue to monitor BP (sit for 5 min first), discussed HCTZ but will not use due to hx of hyponatremia. Add on amlodipine 5mg nightly. Send BP numbers in 2 weeks

## 2023-11-15 RX ORDER — ATORVASTATIN CALCIUM 20 MG/1
20 TABLET, FILM COATED ORAL NIGHTLY
Qty: 90 TABLET | Refills: 1 | Status: SHIPPED | OUTPATIENT
Start: 2023-11-15

## 2023-12-06 RX ORDER — LISINOPRIL 40 MG/1
40 TABLET ORAL DAILY
Qty: 90 TABLET | Refills: 0 | Status: SHIPPED | OUTPATIENT
Start: 2023-12-06

## 2023-12-13 ENCOUNTER — OFFICE VISIT (OUTPATIENT)
Dept: ONCOLOGY | Facility: CLINIC | Age: 88
End: 2023-12-13
Payer: MEDICARE

## 2023-12-13 ENCOUNTER — LAB (OUTPATIENT)
Dept: OTHER | Facility: HOSPITAL | Age: 88
End: 2023-12-13
Payer: MEDICARE

## 2023-12-13 VITALS
HEART RATE: 69 BPM | DIASTOLIC BLOOD PRESSURE: 85 MMHG | RESPIRATION RATE: 16 BRPM | BODY MASS INDEX: 28.44 KG/M2 | SYSTOLIC BLOOD PRESSURE: 140 MMHG | OXYGEN SATURATION: 100 % | TEMPERATURE: 97.8 F | HEIGHT: 63 IN | WEIGHT: 160.5 LBS

## 2023-12-13 DIAGNOSIS — D72.820 LYMPHOCYTOSIS: ICD-10-CM

## 2023-12-13 DIAGNOSIS — I82.462 ACUTE DEEP VEIN THROMBOSIS (DVT) OF CALF MUSCLE VEIN OF LEFT LOWER EXTREMITY: ICD-10-CM

## 2023-12-13 DIAGNOSIS — Z86.718 HISTORY OF DEEP VEIN THROMBOSIS: ICD-10-CM

## 2023-12-13 DIAGNOSIS — D75.1 POLYCYTHEMIA: Primary | ICD-10-CM

## 2023-12-13 DIAGNOSIS — D75.1 POLYCYTHEMIA: ICD-10-CM

## 2023-12-13 LAB
ALBUMIN SERPL-MCNC: 4.5 G/DL (ref 3.5–5.2)
ALBUMIN/GLOB SERPL: 1.6 G/DL
ALP SERPL-CCNC: 96 U/L (ref 39–117)
ALT SERPL W P-5'-P-CCNC: 14 U/L (ref 1–33)
ANION GAP SERPL CALCULATED.3IONS-SCNC: 12.7 MMOL/L (ref 5–15)
AST SERPL-CCNC: 21 U/L (ref 1–32)
BASOPHILS # BLD AUTO: 0.06 10*3/MM3 (ref 0–0.2)
BASOPHILS NFR BLD AUTO: 0.6 % (ref 0–1.5)
BILIRUB SERPL-MCNC: 0.5 MG/DL (ref 0–1.2)
BUN SERPL-MCNC: 17 MG/DL (ref 8–23)
BUN/CREAT SERPL: 21.8 (ref 7–25)
CALCIUM SPEC-SCNC: 9.7 MG/DL (ref 8.6–10.5)
CHLORIDE SERPL-SCNC: 98 MMOL/L (ref 98–107)
CO2 SERPL-SCNC: 26.3 MMOL/L (ref 22–29)
CREAT SERPL-MCNC: 0.78 MG/DL (ref 0.57–1)
DEPRECATED RDW RBC AUTO: 46 FL (ref 37–54)
EGFRCR SERPLBLD CKD-EPI 2021: 74.1 ML/MIN/1.73
EOSINOPHIL # BLD AUTO: 0.28 10*3/MM3 (ref 0–0.4)
EOSINOPHIL NFR BLD AUTO: 2.9 % (ref 0.3–6.2)
ERYTHROCYTE [DISTWIDTH] IN BLOOD BY AUTOMATED COUNT: 13.4 % (ref 12.3–15.4)
GLOBULIN UR ELPH-MCNC: 2.8 GM/DL
GLUCOSE SERPL-MCNC: 94 MG/DL (ref 65–99)
HCT VFR BLD AUTO: 47.7 % (ref 34–46.6)
HGB BLD-MCNC: 15.2 G/DL (ref 12–15.9)
IMM GRANULOCYTES # BLD AUTO: 0.03 10*3/MM3 (ref 0–0.05)
IMM GRANULOCYTES NFR BLD AUTO: 0.3 % (ref 0–0.5)
LDH SERPL-CCNC: 195 U/L (ref 135–214)
LYMPHOCYTES # BLD AUTO: 3.26 10*3/MM3 (ref 0.7–3.1)
LYMPHOCYTES NFR BLD AUTO: 33.7 % (ref 19.6–45.3)
MCH RBC QN AUTO: 29.7 PG (ref 26.6–33)
MCHC RBC AUTO-ENTMCNC: 31.9 G/DL (ref 31.5–35.7)
MCV RBC AUTO: 93.2 FL (ref 79–97)
MONOCYTES # BLD AUTO: 0.67 10*3/MM3 (ref 0.1–0.9)
MONOCYTES NFR BLD AUTO: 6.9 % (ref 5–12)
NEUTROPHILS NFR BLD AUTO: 5.36 10*3/MM3 (ref 1.7–7)
NEUTROPHILS NFR BLD AUTO: 55.6 % (ref 42.7–76)
NRBC BLD AUTO-RTO: 0 /100 WBC (ref 0–0.2)
PLATELET # BLD AUTO: 318 10*3/MM3 (ref 140–450)
PMV BLD AUTO: 10.7 FL (ref 6–12)
POTASSIUM SERPL-SCNC: 4.5 MMOL/L (ref 3.5–5.2)
PROT SERPL-MCNC: 7.3 G/DL (ref 6–8.5)
RBC # BLD AUTO: 5.12 10*6/MM3 (ref 3.77–5.28)
SODIUM SERPL-SCNC: 137 MMOL/L (ref 136–145)
URATE SERPL-MCNC: 5.4 MG/DL (ref 2.4–5.7)
WBC NRBC COR # BLD AUTO: 9.66 10*3/MM3 (ref 3.4–10.8)

## 2023-12-13 PROCEDURE — 85025 COMPLETE CBC W/AUTO DIFF WBC: CPT | Performed by: INTERNAL MEDICINE

## 2023-12-13 PROCEDURE — 80053 COMPREHEN METABOLIC PANEL: CPT | Performed by: INTERNAL MEDICINE

## 2023-12-13 PROCEDURE — 82668 ASSAY OF ERYTHROPOIETIN: CPT | Performed by: INTERNAL MEDICINE

## 2023-12-13 PROCEDURE — 1160F RVW MEDS BY RX/DR IN RCRD: CPT | Performed by: INTERNAL MEDICINE

## 2023-12-13 PROCEDURE — 1126F AMNT PAIN NOTED NONE PRSNT: CPT | Performed by: INTERNAL MEDICINE

## 2023-12-13 PROCEDURE — 1159F MED LIST DOCD IN RCRD: CPT | Performed by: INTERNAL MEDICINE

## 2023-12-13 PROCEDURE — 36415 COLL VENOUS BLD VENIPUNCTURE: CPT

## 2023-12-13 PROCEDURE — 84550 ASSAY OF BLOOD/URIC ACID: CPT | Performed by: INTERNAL MEDICINE

## 2023-12-13 PROCEDURE — 83615 LACTATE (LD) (LDH) ENZYME: CPT | Performed by: INTERNAL MEDICINE

## 2023-12-13 PROCEDURE — 99214 OFFICE O/P EST MOD 30 MIN: CPT | Performed by: INTERNAL MEDICINE

## 2023-12-13 NOTE — PROGRESS NOTES
Subjective     CHIEF COMPLAINT:      Chief Complaint   Patient presents with    Follow-up     HISTORY OF PRESENT ILLNESS:     Isabel Handy is a 88 y.o. female patient who returns today for follow up on her polycythemia.  She returns today for follow-up reporting no new symptoms.  She is taking aspirin 81 mg daily.  She reports some bruising.  No bleeding.  No leg pain or swelling.  No chest pain or shortness of breath.    ROS:  Pertinent ROS is in the HPI.     Past medical, surgical, social and family history were reviewed.     MEDICATIONS:    Current Outpatient Medications:     acetaminophen (TYLENOL) 500 MG tablet, Take 1 tablet by mouth Every 6 (Six) Hours As Needed for Mild Pain., Disp: 30 tablet, Rfl: 0    amitriptyline (ELAVIL) 25 MG tablet, Take 1 tablet by mouth Every Night., Disp: 90 tablet, Rfl: 1    aspirin 81 MG chewable tablet, Chew 1 tablet Daily., Disp: , Rfl:     atorvastatin (LIPITOR) 20 MG tablet, TAKE 1 TABLET BY MOUTH EVERY NIGHT, Disp: 90 tablet, Rfl: 1    CRANBERRY PO, Take  by mouth., Disp: , Rfl:     esomeprazole (nexIUM) 40 MG capsule, TAKE 1 CAPSULE BY MOUTH EVERY MORNING BEFORE BREAKFAST, Disp: 90 capsule, Rfl: 1    levothyroxine (SYNTHROID, LEVOTHROID) 75 MCG tablet, TAKE 1 TABLET BY MOUTH DAILY, Disp: 90 tablet, Rfl: 1    lisinopril (PRINIVIL,ZESTRIL) 40 MG tablet, Take 1 tablet by mouth Daily., Disp: 90 tablet, Rfl: 0    multivitamin (THERAGRAN) tablet tablet, Take  by mouth Daily., Disp: , Rfl:     nitrofurantoin, macrocrystal-monohydrate, (MACROBID) 100 MG capsule, Take 1 capsule by mouth Daily., Disp: , Rfl:     VITAMIN D PO, Take  by mouth., Disp: , Rfl:     amLODIPine (NORVASC) 5 MG tablet, Take 1 tablet by mouth Daily., Disp: 30 tablet, Rfl: 6    estradiol (ESTRACE VAGINAL) 0.1 MG/GM vaginal cream, Insert 1 g into the vagina 2 (Two) Times a Week. (Patient not taking: Reported on 12/13/2023), Disp: 42.5 g, Rfl: 6    Probiotic Product (SOLUBLE FIBER/PROBIOTICS) chewable tablet,  "Chew 1 each Daily. (Patient not taking: Reported on 12/13/2023), Disp: , Rfl:   Objective     VITAL SIGNS:     Vitals:    12/13/23 1349   BP: 140/85   Pulse: 69   Resp: 16   Temp: 97.8 °F (36.6 °C)   TempSrc: Temporal   SpO2: 100%   Weight: 72.8 kg (160 lb 8 oz)   Height: 160 cm (62.99\")   PainSc: 0-No pain     Body mass index is 28.44 kg/m².     Wt Readings from Last 5 Encounters:   12/13/23 72.8 kg (160 lb 8 oz)   10/30/23 72.6 kg (160 lb)   09/26/23 73.5 kg (162 lb)   08/04/23 72.6 kg (160 lb)   07/25/23 74.8 kg (164 lb 14.4 oz)     PHYSICAL EXAMINATION:   GENERAL: The patient appears in good general condition, not in acute distress.   SKIN: Ecchymosis over the hands.  EYES: No jaundice. No Pallor.  CHEST: Normal respiratory effort.   ABDOMEN: Distended.  EXTREMITIES: No edema or erythema.  No calf tenderness.    DIAGNOSTIC DATA:     Results from last 7 days   Lab Units 12/13/23  1319   WBC 10*3/mm3 9.66   NEUTROS ABS 10*3/mm3 5.36   HEMOGLOBIN g/dL 15.2   HEMATOCRIT % 47.7*   PLATELETS 10*3/mm3 318     Results from last 7 days   Lab Units 12/13/23  1319   SODIUM mmol/L 137   POTASSIUM mmol/L 4.5   CHLORIDE mmol/L 98   CO2 mmol/L 26.3   BUN mg/dL 17   CREATININE mg/dL 0.78   CALCIUM mg/dL 9.7   ALBUMIN g/dL 4.5   BILIRUBIN mg/dL 0.5   ALK PHOS U/L 96   ALT (SGPT) U/L 14   AST (SGOT) U/L 21   GLUCOSE mg/dL 94     Component      Latest Ref Rng 12/13/2023   LDH      135 - 214 U/L 195    Uric Acid      2.4 - 5.7 mg/dL 5.4        Assessment & Plan    *Polycythemia.    It was identified in the labs dating back to 2015.    Patient reports being told that she needed to donate blood but she never did so.    Hemoglobin was 16.3 with a hematocrit of 49.3% on 11/24/2021.    Patient does not have underlying lung disease or sleep apnea.  She does not smoke.    Testing for JAK2 V617F mutation, JAK2 exam 12-15 mutation, CALReticulin and MPL was negative.  Patient was suspected of having an element of hemoconcentration.  We " advised the patient to increase fluid intake and cut down on intake of iron rich food.  Hemoglobin decreased to 15.1 and hematocrit decreased to 47% today.  hemoglobin increased to 16.3 on 1/26/2022.  Hematocrit was 49.3%.   We advised the patient to maintain adequate hydration and reduce intake of iron rich food.  8/10/2022: Hemoglobin improved to 15.0 and hematocrit improved to 47.8%.  3/31/2023: Hemoglobin 15.4 and hematocrit of 50.2%.  Patient reported that she does not drink enough fluids regularly.  Patient had a new DVT in the left leg that was diagnosed in early March 2023.  I explained that the DVT may have been in part secondary to the polycythemia and this may be an indicator of an underlying polycythemia vera.  3/31/2023: Labs revealed no evidence of JAK2, MPL or PEGGY reticulin mutation.  Patient increased her fluid intake.  She switched to a multivitamin with no iron.  6/14/2023: Hemoglobin 15.5.  Hematocrit 48.3%.  12/13/2023: Hemoglobin 15.2.  Hematocrit 47.7%.  Her levels improved with the patient increasing hydration and reducing intake of iron rich food.    She does not need a therapeutic phlebotomy.    *Lymphocytosis.    Lymphocyte count increased to 3600 on 5/17/2018 and has been on the high side since that time.    Lymphocyte count was 3850 on 11/24/2021.    Peripheral blood flow cytometry on 11/24/2021 revealed kappa restricted B cells.  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    This may also be attributed to underlying reactive process.   Total WBC increased to 12,940 on 8/10/2022 with increase in the neutrophil count to 8,150. This is attributed to a recent UTI.   3/31/2023: Lymphocyte count was 4210.  6/14/2023: Lymphocyte count improved to 3330.  12/13/2023: Lymphocyte count improved to 3260.  I reassured the patient that improvement in lymphocyte count indicates that this is unlikely to represent evolving CLL.    *History of left calf DVT.  Patient  developed pain in the left leg on 3/4/2023 and had difficulty putting weight on it.    Venous Doppler on 3/5/2023 revealed no flow and no compressibility within the left anterior tibial vein.  The DVT developed after she had a fall and injury to the left leg resulting in decreased mobility.  Her leg was in a boot.  The DVT is therefore considered provoked episode.  She is tolerating Eliquis currently at 2.5 mg twice daily.  The leg pain decreased after she started anticoagulation.   She has prominent superficial veins in the left lower extremity but with no signs of thrombosis.  Since the DVT was a calf vein DVT, I recommended 3 months of anticoagulation.  Venous Doppler in 6/5/2023 showed resolution of the DVT.    After 3 months of anticoagulation, she was switched to aspirin 81 mg daily.  She is tolerating aspirin except for some bruising.    PLAN:    1.  Continue aspirin 81 mg daily.   2.  Continue 20 and adequate hydration.  3.  Continue low iron diet.  4.  Since her counts are improving, we will see her in follow-up in September 2024.  We will obtain CBC CMP LDH uric acid.        Devan Salmeron MD  12/13/23

## 2023-12-14 LAB — EPO SERPL-ACNC: 4.8 MIU/ML (ref 2.6–18.5)

## 2023-12-19 ENCOUNTER — OFFICE (OUTPATIENT)
Dept: URBAN - METROPOLITAN AREA CLINIC 66 | Facility: CLINIC | Age: 88
End: 2023-12-19

## 2023-12-19 VITALS
HEIGHT: 63 IN | SYSTOLIC BLOOD PRESSURE: 149 MMHG | WEIGHT: 161 LBS | HEART RATE: 80 BPM | DIASTOLIC BLOOD PRESSURE: 83 MMHG

## 2023-12-19 DIAGNOSIS — Z86.010 PERSONAL HISTORY OF COLONIC POLYPS: ICD-10-CM

## 2023-12-19 DIAGNOSIS — R93.3 ABNORMAL FINDINGS ON DIAGNOSTIC IMAGING OF OTHER PARTS OF DI: ICD-10-CM

## 2023-12-19 DIAGNOSIS — K21.9 GASTRO-ESOPHAGEAL REFLUX DISEASE WITHOUT ESOPHAGITIS: ICD-10-CM

## 2023-12-19 DIAGNOSIS — Z80.9 FAMILY HISTORY OF MALIGNANT NEOPLASM, UNSPECIFIED: ICD-10-CM

## 2023-12-19 PROCEDURE — 99213 OFFICE O/P EST LOW 20 MIN: CPT | Performed by: INTERNAL MEDICINE

## 2023-12-19 RX ORDER — OMEPRAZOLE 40 MG/1
40 CAPSULE, DELAYED RELEASE ORAL
Qty: 90 | Refills: 3 | Status: ACTIVE

## 2024-02-07 ENCOUNTER — ON CAMPUS - OUTPATIENT (OUTPATIENT)
Dept: URBAN - METROPOLITAN AREA HOSPITAL 114 | Facility: HOSPITAL | Age: 89
End: 2024-02-07

## 2024-02-07 ENCOUNTER — HOSPITAL ENCOUNTER (OUTPATIENT)
Facility: HOSPITAL | Age: 89
Setting detail: HOSPITAL OUTPATIENT SURGERY
Discharge: HOME | End: 2024-02-07
Attending: INTERNAL MEDICINE | Admitting: INTERNAL MEDICINE
Payer: MEDICARE

## 2024-02-07 ENCOUNTER — ANESTHESIA (OUTPATIENT)
Dept: GASTROENTEROLOGY | Facility: HOSPITAL | Age: 89
End: 2024-02-07
Payer: MEDICARE

## 2024-02-07 ENCOUNTER — ANESTHESIA EVENT (OUTPATIENT)
Dept: GASTROENTEROLOGY | Facility: HOSPITAL | Age: 89
End: 2024-02-07
Payer: MEDICARE

## 2024-02-07 VITALS
HEART RATE: 73 BPM | OXYGEN SATURATION: 98 % | WEIGHT: 158 LBS | SYSTOLIC BLOOD PRESSURE: 150 MMHG | DIASTOLIC BLOOD PRESSURE: 72 MMHG | TEMPERATURE: 98.1 F | RESPIRATION RATE: 16 BRPM | BODY MASS INDEX: 28 KG/M2 | HEIGHT: 63 IN

## 2024-02-07 DIAGNOSIS — Z80.0 FAMILY HISTORY OF MALIGNANT NEOPLASM OF DIGESTIVE ORGANS: ICD-10-CM

## 2024-02-07 DIAGNOSIS — K64.0 FIRST DEGREE HEMORRHOIDS: ICD-10-CM

## 2024-02-07 DIAGNOSIS — Z12.11 ENCOUNTER FOR SCREENING FOR MALIGNANT NEOPLASM OF COLON: ICD-10-CM

## 2024-02-07 DIAGNOSIS — K57.30 DIVERTICULOSIS OF LARGE INTESTINE WITHOUT PERFORATION OR ABS: ICD-10-CM

## 2024-02-07 PROCEDURE — 25010000002 PROPOFOL 10 MG/ML EMULSION

## 2024-02-07 PROCEDURE — 25810000003 LACTATED RINGERS PER 1000 ML: Performed by: INTERNAL MEDICINE

## 2024-02-07 PROCEDURE — G0105 COLORECTAL SCRN; HI RISK IND: HCPCS | Performed by: INTERNAL MEDICINE

## 2024-02-07 RX ORDER — SODIUM CHLORIDE 0.9 % (FLUSH) 0.9 %
10 SYRINGE (ML) INJECTION AS NEEDED
Status: DISCONTINUED | OUTPATIENT
Start: 2024-02-07 | End: 2024-02-07 | Stop reason: HOSPADM

## 2024-02-07 RX ORDER — PROPOFOL 10 MG/ML
VIAL (ML) INTRAVENOUS AS NEEDED
Status: DISCONTINUED | OUTPATIENT
Start: 2024-02-07 | End: 2024-02-07 | Stop reason: SURG

## 2024-02-07 RX ORDER — LIDOCAINE HYDROCHLORIDE 10 MG/ML
0.5 INJECTION, SOLUTION INFILTRATION; PERINEURAL ONCE AS NEEDED
Status: DISCONTINUED | OUTPATIENT
Start: 2024-02-07 | End: 2024-02-07 | Stop reason: HOSPADM

## 2024-02-07 RX ORDER — PROPOFOL 10 MG/ML
VIAL (ML) INTRAVENOUS AS NEEDED
Status: DISCONTINUED | OUTPATIENT
Start: 2024-02-07 | End: 2024-02-07

## 2024-02-07 RX ORDER — SODIUM CHLORIDE, SODIUM LACTATE, POTASSIUM CHLORIDE, CALCIUM CHLORIDE 600; 310; 30; 20 MG/100ML; MG/100ML; MG/100ML; MG/100ML
1000 INJECTION, SOLUTION INTRAVENOUS CONTINUOUS
Status: DISCONTINUED | OUTPATIENT
Start: 2024-02-07 | End: 2024-02-07 | Stop reason: HOSPADM

## 2024-02-07 RX ORDER — LIDOCAINE HYDROCHLORIDE 20 MG/ML
INJECTION, SOLUTION INFILTRATION; PERINEURAL AS NEEDED
Status: DISCONTINUED | OUTPATIENT
Start: 2024-02-07 | End: 2024-02-07 | Stop reason: SURG

## 2024-02-07 RX ADMIN — LIDOCAINE HYDROCHLORIDE 50 MG: 20 INJECTION, SOLUTION INFILTRATION; PERINEURAL at 10:58

## 2024-02-07 RX ADMIN — SODIUM CHLORIDE, POTASSIUM CHLORIDE, SODIUM LACTATE AND CALCIUM CHLORIDE 1000 ML: 600; 310; 30; 20 INJECTION, SOLUTION INTRAVENOUS at 10:15

## 2024-02-07 RX ADMIN — PROPOFOL 50 MG: 10 INJECTION, EMULSION INTRAVENOUS at 10:58

## 2024-02-07 RX ADMIN — PROPOFOL 120 MCG/KG/MIN: 10 INJECTION, EMULSION INTRAVENOUS at 10:59

## 2024-02-07 NOTE — H&P
[facility]   HISTORY AND PHYSICAL    Patient Name: Isabel Handy  : 1935  MRN: [id number]  Primary Care Physician:  Beth Figueredo APRN  Date of admission: 2024    Subjective   Subjective     Chief Complaint: abnormal imaging of the colon     History of Present Illness  To er abdominal pain which improved mother with colon cancer   Review of Systems   All other systems reviewed and are negative.       Personal History     Past Medical History:   Diagnosis Date    Arrhythmia     Arthritis     DVT (deep venous thrombosis)     GERD (gastroesophageal reflux disease)     History of pneumonia     Hyperlipidemia     Hypertension     Hypothyroidism     Skin cancer     Subarachnoid hemorrhage     Vasodepressor syncope        Past Surgical History:   Procedure Laterality Date    APPENDECTOMY N/A     BLEPHAROPLASTY Bilateral     BREAST CYST EXCISION      to remove cysts    BREAST IMPLANT SURGERY Bilateral 2002    Dr. Marissa Cobos,  [place]    BUNIONECTOMY Bilateral     CATARACT EXTRACTION Bilateral     COLONOSCOPY N/A 2018    Procedure: COLONOSCOPY into cecum and T.I. with cold biopsy polypectomy;  Surgeon: Roly Be MD;  Location: [facility] ENDOSCOPY;  Service: Gastroenterology    CORRECTION HAMMER TOE Left 2013    Revision correction through an altered surgical field of the left second hammertoe, correciton of the left third hammertoe, DuVries plantar condylectomy of the left second metatarsal, Plantar DuVries condylectomy of the left third metatarsal, partial excision of the left fifth metatarsal head for alleviation of bunionette deformity-Dr. Jordan Montilla, [facility]    ENDOSCOPY N/A 2018    Procedure: ESOPHAGOGASTRODUODENOSCOPY with esophageal dilation #54 Castellanos;  Surgeon: Roly Be MD;  Location: [facility] ENDOSCOPY;  Service: Gastroenterology    ENDOSCOPY AND COLONOSCOPY N/A 2010    Hiatal hernia, chronic gastritis: biopsied, normal examined duodenum, normal  examined ileum, mild diverticulosis sigmoid colon-Dr. Roly Be, [facility]    TOTAL ABDOMINAL HYSTERECTOMY Bilateral        Family History: family history includes Arthritis in her mother; COPD in her sister; Colon cancer in her mother; Colon polyps in her mother; Diabetes in her paternal aunt; Heart attack in her father and mother; Heart disease in her brother, brother, father, and mother; Hypertension in her brother, father, and mother; Prostate cancer in her father; Tuberculosis in her brother. Otherwise pertinent FHx was reviewed and not pertinent to current issue.    Social History:  reports that she quit smoking about 29 years ago. Her smoking use included cigarettes. She has a 0.50 pack-year smoking history. She has been exposed to tobacco smoke. She has never used smokeless tobacco. She reports that she does not drink alcohol and does not use drugs.    Home Medications:  Cranberry, Vitamin D, acetaminophen, amLODIPine, amitriptyline, aspirin, atorvastatin, escitalopram, esomeprazole, estradiol, levothyroxine, lisinopril, multivitamin, and nitrofurantoin (macrocrystal-monohydrate)    Allergies:  Allergies   Allergen Reactions    Gatifloxacin Arrhythmia    Rosuvastatin Myalgia    Bactrim [Sulfamethoxazole-Trimethoprim] Headache       Objective    Objective     Vitals:   Temp:  [98.1 °F (36.7 °C)] 98.1 °F (36.7 °C)  Heart Rate:  [81] 81  Resp:  [16] 16  BP: (154)/(72) 154/72    Physical Exam  HENT:      Right Ear: External ear normal.      Left Ear: External ear normal.      Mouth/Throat:      Pharynx: Oropharynx is clear.   Eyes:      Conjunctiva/sclera: Conjunctivae normal.   Cardiovascular:      Rate and Rhythm: Normal rate.      Pulses: Normal pulses.   Pulmonary:      Effort: Pulmonary effort is normal.   Abdominal:      General: Abdomen is flat.   Skin:     General: Skin is warm and dry.   Neurological:      General: No focal deficit present.      Mental Status: She is alert.   Psychiatric:         Mood and  Affect: Mood normal.         Result Review    Result Review:  I have personally reviewed the results from the time of this admission to 2/7/2024 10:59 EST and agree with these findings:  []  Laboratory list / accordion  []  Microbiology  []  Radiology  []  EKG/Telemetry   []  Cardiology/Vascular   []  Pathology  []  Old records  []  Other:  Most notable findings include:       Assessment & Plan   Assessment / Plan     Brief Patient Summary:  Isabel Handy is a 88 y.o. female who   Abnormal imaging of  the colon  Family history of colon cancer    Active Hospital Problems:  There are no active hospital problems to display for this patient.    Plan: Colonoscopy risks, alternatives and benefits discussed with patient and the patient is agreeable to having procedure done.      DVT prophylaxis:  No DVT prophylaxis order currently exists.        CODE STATUS:       Admission Status:  I believe this patient meets outpatient  status.    Roly Be MD

## 2024-02-07 NOTE — ANESTHESIA PREPROCEDURE EVALUATION
Anesthesia Evaluation     NPO Solid Status: > 8 hours  NPO Liquid Status: > 8 hours           Airway   Mallampati: II  Dental      Comment: Retainer    Pulmonary    Cardiovascular     ECG reviewed    (+) hypertension, hyperlipidemia,  carotid artery disease      Neuro/Psych  (+) psychiatric history  GI/Hepatic/Renal/Endo      Musculoskeletal     Abdominal    Substance History      OB/GYN          Other                          Anesthesia Plan    ASA 3     MAC     intravenous induction

## 2024-02-07 NOTE — ANESTHESIA POSTPROCEDURE EVALUATION
"Patient: Isabel Handy    Procedure Summary       Date: 02/07/24 Room / Location: [facility] ENDOSCOPY 10 /  [facility]    Anesthesia Start: 1055 Anesthesia Stop: 1124    Procedure: COLONOSCOPY to cecum and ti Diagnosis:     Surgeons: Roly Be MD Provider: Adan Fournier MD    Anesthesia Type: MAC ASA Status: 3            Anesthesia Type: MAC    Vitals  Vitals Value Taken Time   /72 02/07/24 1143   Temp     Pulse 74 02/07/24 1145   Resp 16 02/07/24 1143   SpO2 99 % 02/07/24 1145   Vitals shown include unfiled device data.        Post Anesthesia Care and Evaluation    Patient location during evaluation: bedside  Patient participation: complete - patient participated  Level of consciousness: awake and alert  Pain management: adequate    Airway patency: patent  Anesthetic complications: No anesthetic complications    Cardiovascular status: acceptable  Respiratory status: acceptable  Hydration status: acceptable    Comments: /72 (BP Location: Left arm, Patient Position: Lying)   Pulse 73   Temp 36.7 °C (98.1 °F) (Oral)   Resp 16   Ht 160 cm (63\")   Wt 71.7 kg (158 lb)   LMP  (LMP Unknown)   SpO2 98%   BMI 27.99 kg/m²     "

## 2024-02-07 NOTE — DISCHARGE INSTRUCTIONS

## 2024-03-04 DIAGNOSIS — E03.9 HYPOTHYROIDISM, UNSPECIFIED TYPE: ICD-10-CM

## 2024-03-04 RX ORDER — LEVOTHYROXINE SODIUM 0.07 MG/1
75 TABLET ORAL DAILY
Qty: 90 TABLET | Refills: 1 | Status: SHIPPED | OUTPATIENT
Start: 2024-03-04

## 2024-03-14 RX ORDER — LISINOPRIL 40 MG/1
40 TABLET ORAL DAILY
Qty: 90 TABLET | Refills: 0 | Status: SHIPPED | OUTPATIENT
Start: 2024-03-14

## 2024-03-14 NOTE — TELEPHONE ENCOUNTER
Caller: Handy Isabel    Relationship: Self    Best call back number: [phone]    Requested Prescriptions:   Requested Prescriptions     Pending Prescriptions Disp Refills    lisinopril (PRINIVIL,ZESTRIL) 40 MG tablet 90 tablet 0     Sig: Take 1 tablet by mouth Daily.        Pharmacy where request should be sent: [facility] [address] - [phone]  - [phone]     Last office visit with prescribing clinician: 10/30/2023   Last telemedicine visit with prescribing clinician: Visit date not found   Next office visit with prescribing clinician: 3/28/2024     Does the patient have less than a 3 day supply:  [x] Yes  [] No      Ne Aguilar Rep   03/14/24 09:50 EDT

## 2024-03-20 DIAGNOSIS — R73.03 PREDIABETES: ICD-10-CM

## 2024-03-20 DIAGNOSIS — I10 ESSENTIAL HYPERTENSION: ICD-10-CM

## 2024-03-20 DIAGNOSIS — E55.9 VITAMIN D DEFICIENCY: ICD-10-CM

## 2024-03-20 DIAGNOSIS — E78.5 HYPERLIPIDEMIA, UNSPECIFIED HYPERLIPIDEMIA TYPE: ICD-10-CM

## 2024-03-20 DIAGNOSIS — E03.9 HYPOTHYROIDISM, UNSPECIFIED TYPE: ICD-10-CM

## 2024-03-26 ENCOUNTER — HOSPITAL ENCOUNTER (EMERGENCY)
Facility: HOSPITAL | Age: 89
Discharge: HOME | End: 2024-03-27
Attending: STUDENT IN AN ORGANIZED HEALTH CARE EDUCATION/TRAINING PROGRAM | Admitting: STUDENT IN AN ORGANIZED HEALTH CARE EDUCATION/TRAINING PROGRAM
Payer: MEDICARE

## 2024-03-26 DIAGNOSIS — R51.9 ACUTE NONINTRACTABLE HEADACHE, UNSPECIFIED HEADACHE TYPE: Primary | ICD-10-CM

## 2024-03-26 DIAGNOSIS — R42 LIGHT HEADEDNESS: ICD-10-CM

## 2024-03-26 DIAGNOSIS — R03.0 ELEVATED BLOOD PRESSURE READING: ICD-10-CM

## 2024-03-26 PROCEDURE — 93010 ELECTROCARDIOGRAM REPORT: CPT | Performed by: INTERNAL MEDICINE

## 2024-03-26 PROCEDURE — 99285 EMERGENCY DEPT VISIT HI MDM: CPT

## 2024-03-26 PROCEDURE — 93005 ELECTROCARDIOGRAM TRACING: CPT

## 2024-03-27 ENCOUNTER — APPOINTMENT (OUTPATIENT)
Dept: CT IMAGING | Facility: HOSPITAL | Age: 89
End: 2024-03-27
Payer: MEDICARE

## 2024-03-27 VITALS
OXYGEN SATURATION: 95 % | HEIGHT: 63 IN | WEIGHT: 160.05 LBS | SYSTOLIC BLOOD PRESSURE: 135 MMHG | BODY MASS INDEX: 28.36 KG/M2 | DIASTOLIC BLOOD PRESSURE: 70 MMHG | RESPIRATION RATE: 18 BRPM | TEMPERATURE: 98.1 F | HEART RATE: 78 BPM

## 2024-03-27 LAB
ALBUMIN SERPL-MCNC: 4 G/DL (ref 3.5–5.2)
ALBUMIN/GLOB SERPL: 1.7 G/DL
ALP SERPL-CCNC: 87 U/L (ref 39–117)
ALT SERPL W P-5'-P-CCNC: 18 U/L (ref 1–33)
ANION GAP SERPL CALCULATED.3IONS-SCNC: 12 MMOL/L (ref 5–15)
AST SERPL-CCNC: 20 U/L (ref 1–32)
BASOPHILS # BLD AUTO: 0.05 10*3/MM3 (ref 0–0.2)
BASOPHILS NFR BLD AUTO: 0.5 % (ref 0–1.5)
BILIRUB SERPL-MCNC: 0.3 MG/DL (ref 0–1.2)
BUN SERPL-MCNC: 10 MG/DL (ref 8–23)
BUN/CREAT SERPL: 11.9 (ref 7–25)
CALCIUM SPEC-SCNC: 9.5 MG/DL (ref 8.6–10.5)
CHLORIDE SERPL-SCNC: 102 MMOL/L (ref 98–107)
CO2 SERPL-SCNC: 23 MMOL/L (ref 22–29)
CREAT SERPL-MCNC: 0.84 MG/DL (ref 0.57–1)
DEPRECATED RDW RBC AUTO: 40.7 FL (ref 37–54)
EGFRCR SERPLBLD CKD-EPI 2021: 67.8 ML/MIN/1.73
EOSINOPHIL # BLD AUTO: 0.18 10*3/MM3 (ref 0–0.4)
EOSINOPHIL NFR BLD AUTO: 1.8 % (ref 0.3–6.2)
ERYTHROCYTE [DISTWIDTH] IN BLOOD BY AUTOMATED COUNT: 12.6 % (ref 12.3–15.4)
GLOBULIN UR ELPH-MCNC: 2.4 GM/DL
GLUCOSE SERPL-MCNC: 106 MG/DL (ref 65–99)
HCT VFR BLD AUTO: 46.5 % (ref 34–46.6)
HGB BLD-MCNC: 14.7 G/DL (ref 12–15.9)
IMM GRANULOCYTES # BLD AUTO: 0.07 10*3/MM3 (ref 0–0.05)
IMM GRANULOCYTES NFR BLD AUTO: 0.7 % (ref 0–0.5)
LYMPHOCYTES # BLD AUTO: 2.68 10*3/MM3 (ref 0.7–3.1)
LYMPHOCYTES NFR BLD AUTO: 26.7 % (ref 19.6–45.3)
MCH RBC QN AUTO: 28.4 PG (ref 26.6–33)
MCHC RBC AUTO-ENTMCNC: 31.6 G/DL (ref 31.5–35.7)
MCV RBC AUTO: 89.8 FL (ref 79–97)
MONOCYTES # BLD AUTO: 0.71 10*3/MM3 (ref 0.1–0.9)
MONOCYTES NFR BLD AUTO: 7.1 % (ref 5–12)
NEUTROPHILS NFR BLD AUTO: 6.36 10*3/MM3 (ref 1.7–7)
NEUTROPHILS NFR BLD AUTO: 63.2 % (ref 42.7–76)
NRBC BLD AUTO-RTO: 0 /100 WBC (ref 0–0.2)
PLATELET # BLD AUTO: 336 10*3/MM3 (ref 140–450)
PMV BLD AUTO: 10.2 FL (ref 6–12)
POTASSIUM SERPL-SCNC: 4.2 MMOL/L (ref 3.5–5.2)
PROT SERPL-MCNC: 6.4 G/DL (ref 6–8.5)
QT INTERVAL: 378 MS
QTC INTERVAL: 431 MS
RBC # BLD AUTO: 5.18 10*6/MM3 (ref 3.77–5.28)
SODIUM SERPL-SCNC: 137 MMOL/L (ref 136–145)
TROPONIN T SERPL HS-MCNC: 11 NG/L
WBC NRBC COR # BLD AUTO: 10.05 10*3/MM3 (ref 3.4–10.8)

## 2024-03-27 PROCEDURE — 84484 ASSAY OF TROPONIN QUANT: CPT | Performed by: NURSE PRACTITIONER

## 2024-03-27 PROCEDURE — 70496 CT ANGIOGRAPHY HEAD: CPT

## 2024-03-27 PROCEDURE — 25010000002 DIPHENHYDRAMINE PER 50 MG: Performed by: NURSE PRACTITIONER

## 2024-03-27 PROCEDURE — 85025 COMPLETE CBC W/AUTO DIFF WBC: CPT | Performed by: NURSE PRACTITIONER

## 2024-03-27 PROCEDURE — 96374 THER/PROPH/DIAG INJ IV PUSH: CPT

## 2024-03-27 PROCEDURE — 25810000003 SODIUM CHLORIDE 0.9 % SOLUTION: Performed by: NURSE PRACTITIONER

## 2024-03-27 PROCEDURE — 25510000001 IOPAMIDOL PER 1 ML: Performed by: STUDENT IN AN ORGANIZED HEALTH CARE EDUCATION/TRAINING PROGRAM

## 2024-03-27 PROCEDURE — 96375 TX/PRO/DX INJ NEW DRUG ADDON: CPT

## 2024-03-27 PROCEDURE — 96361 HYDRATE IV INFUSION ADD-ON: CPT

## 2024-03-27 PROCEDURE — 25010000002 PROCHLORPERAZINE 10 MG/2ML SOLUTION: Performed by: NURSE PRACTITIONER

## 2024-03-27 PROCEDURE — 80053 COMPREHEN METABOLIC PANEL: CPT | Performed by: NURSE PRACTITIONER

## 2024-03-27 RX ORDER — PROCHLORPERAZINE EDISYLATE 5 MG/ML
5 INJECTION INTRAMUSCULAR; INTRAVENOUS ONCE
Status: COMPLETED | OUTPATIENT
Start: 2024-03-27 | End: 2024-03-27

## 2024-03-27 RX ORDER — DIPHENHYDRAMINE HYDROCHLORIDE 50 MG/ML
12.5 INJECTION INTRAMUSCULAR; INTRAVENOUS ONCE
Status: COMPLETED | OUTPATIENT
Start: 2024-03-27 | End: 2024-03-27

## 2024-03-27 RX ORDER — SODIUM CHLORIDE 0.9 % (FLUSH) 0.9 %
10 SYRINGE (ML) INJECTION AS NEEDED
Status: DISCONTINUED | OUTPATIENT
Start: 2024-03-27 | End: 2024-03-27 | Stop reason: HOSPADM

## 2024-03-27 RX ADMIN — IOPAMIDOL 95 ML: 755 INJECTION, SOLUTION INTRAVENOUS at 01:33

## 2024-03-27 RX ADMIN — PROCHLORPERAZINE EDISYLATE 5 MG: 5 INJECTION INTRAMUSCULAR; INTRAVENOUS at 00:50

## 2024-03-27 RX ADMIN — DIPHENHYDRAMINE HYDROCHLORIDE 12.5 MG: 50 INJECTION, SOLUTION INTRAMUSCULAR; INTRAVENOUS at 00:51

## 2024-03-27 RX ADMIN — SODIUM CHLORIDE 500 ML: 9 INJECTION, SOLUTION INTRAVENOUS at 00:50

## 2024-03-27 NOTE — ED PROVIDER NOTES
MD ATTESTATION NOTE    The IGNACIO and I have discussed this patient's history, physical exam, and treatment plan.  I have reviewed the documentation and personally had a face to face interaction with the patient. I affirm the documentation and agree with the treatment and plan.  The attached note describes my personal findings.      I provided a substantive portion of the care of the patient.  I personally performed the physical exam in its entirety, and below are my findings.        Brief HPI: Patient presented emergency department with left posterior headache, elevated blood pressure.  Sudden onset around 5 and half hours ago.  No other recent illness, fever, chills.  Has not had headaches with this before.  No nausea or vomiting.    PHYSICAL EXAM  ED Triage Vitals   Temp Heart Rate Resp BP SpO2   03/26/24 2150 03/26/24 2150 03/26/24 2150 03/26/24 2229 03/26/24 2150   98.1 °F (36.7 °C) 92 16 (!) 184/87 97 %      Temp src Heart Rate Source Patient Position BP Location FiO2 (%)   03/26/24 2150 -- -- 03/26/24 2229 --   Tympanic   Left arm          GENERAL: no acute distress  HENT: nares patent  EYES: no scleral icterus  CV: regular rhythm, normal rate  RESPIRATORY: normal effort  ABDOMEN: soft  MUSCULOSKELETAL: no deformity  NEURO: alert, moves all extremities, follows commands  PSYCH:  calm, cooperative  SKIN: warm, dry    Vital signs and nursing notes reviewed.        Plan: Patient presented emergency department with acute onset of posterior headache, otherwise well-appearing, vitals otherwise stable.  Has improved somewhat since onset.  History somewhat concerning for thunderclap headache.  Labs otherwise reassuring, imaging demonstrating no evidence of subarachnoid hemorrhage or dural venous sinus thrombosis.  Also appears to have no evidence of aneurysm or other acute arterial abnormality.  Patient improved at reevaluation.  Will discharge with supportive care and outpatient follow-up.    ED Course as of 03/27/24  0628   Wed Mar 27, 2024   0048 I discussed the case with Dr. Herrera and they agree to evaluate the patient at the bedside.    [AR]   0051 #20g IV to the right AC x 1 time placed by me.  Patient tolerated well. [AR]   0158 EKG interpreted myself:  2300, sinus rhythm rate of 78, no acute ST segment changes or T wave inversions. [FS]   0212 CT head interpreted myself:  No hemorrhage or midline shift [FS]   0308 BP: 123/64 [AR]   0314 The patient was reexamined.  They have had symptomatic improvement during their ED stay.  I discussed today's findings with the patient, explaining the pertinent positives and negatives from today's visit, and the plan of care.  Discussed plan for discharge as there is no emergent indication for admission.  Discussed limitation of the ED work-up and that this is to rule out life-threatening emergencies but that they could require further testing as determined by their primary care and or any referred specialist patient is agreeable and understands need for follow-up and repeat exam/testing.  Patient is aware that discharge does not mean there is nothing wrong, indicates no emergency is present, and that they must continue their care with their primary care physician and/or any referred specialist.  They were given appropriate follow-up with their primary care physician and/or specialist.  I had an extensive discussion on the expected clinical course and return precautions.  Patient understands to return to the emergency department for continuation, worsening, or new symptoms.  I answered any of the patient's questions. Patient was discharged home in a stable condition.  Ambulatory with steady gait.   [AR]      ED Course User Index  [AR] Lian Tom APRN  [FS] Nitish Herrera MD       SHARED VISIT: This visit was performed by BOTH a physician and an APC. The substantive portion of the medical decision making was performed by this attesting physician who made or approved the management  plan and takes responsibility for patient management. All studies in the APC note (if performed) were independently interpreted by me.        Nitish Herrera MD  03/27/24 0612

## 2024-03-27 NOTE — ED PROVIDER NOTES
EMERGENCY DEPARTMENT ENCOUNTER  Room Number:  07/07  PCP: Beth Figueredo APRN  Independent Historians: Patient      HPI:  Chief Complaint: had concerns including Headache and Hypertension.     A complete HPI/ROS/PMH/PSH/SH/FH are unobtainable due to: None    Chronic or social conditions impacting patient care (Social Determinants of Health): None      Context: Isabel Handy is a 88 y.o. female with a medical history of GERD, carotid atherosclerosis, hypothyroidism, insomnia, hypertension, hyperlipidemia, lung nodule, hyperglycemia, DVT, subarachnoid hemorrhage, who presents to the emergency department with complaints of left occipital headache.  Symptoms started tonight at approximately 6:30 PM and have been constant.  Patient states after she developed a headache she became lightheaded.  She describes lightheadedness as a feeling as if she is going to pass out.  She reports her blood pressure has been elevated, states at home she had systolic blood pressures in the 180s and 190s.  She is compliant with her blood pressure medications.  Patient was seen at urgent care and referred to ED.  Patient denies fever, chills, dizziness, numbness, tingling, unilateral extremity weakness, syncope, shortness of breath, chest pain, abdominal pain, nausea, vomiting, diarrhea, dysuria, hematuria, or other complaints.        Review of prior external notes (non-ED) -and- Review of prior external test results outside of this encounter:   March 26, 2024: Urgent care office visit.  Patient was seen for hypertension and occipital headache.  Referred to ED.    PAST MEDICAL HISTORY  Active Ambulatory Problems     Diagnosis Date Noted    Gastroesophageal reflux disease 03/15/2016    Menopausal syndrome 03/15/2016    Osteoarthritis of cervical spine 03/15/2016    Carotid atherosclerosis 03/15/2016    Prediabetes 03/15/2016    Low back pain 03/15/2016    Insomnia 03/15/2016    Hypothyroidism 03/15/2016    Essential hypertension  03/15/2016    Hyperlipidemia 03/15/2016    Allergic rhinitis 03/15/2016    Palpitations 03/15/2016    Anxiety 04/12/2016    PVC's (premature ventricular contractions) 04/12/2016    Vitamin D deficiency 10/18/2016    Lung nodule 02/03/2017    Hyperglycemia 04/04/2017    Occipital pain 09/05/2018    Malaise and fatigue 09/05/2018    Acute nonintractable headache 09/05/2018    Elevated hematocrit 03/19/2019    Posterior subcapsular age-related cataract of both eyes 03/24/2020    Dry eye syndrome of bilateral lacrimal glands 03/24/2020    Hearing loss 03/30/2021    Nausea 03/30/2021    Elevated hemoglobin 11/08/2021    Hard of hearing 10/11/2022    Acute deep vein thrombosis (DVT) of calf muscle vein of left lower extremity 03/20/2023    History of CVA (cerebrovascular accident) 07/24/2023     Resolved Ambulatory Problems     Diagnosis Date Noted    Coxitis 03/15/2016    Hiatal hernia 03/15/2016    Shortness of breath 03/15/2016    Edema 05/16/2016     Past Medical History:   Diagnosis Date    Arrhythmia     Arthritis     DVT (deep venous thrombosis)     GERD (gastroesophageal reflux disease)     History of pneumonia     Hypertension     Skin cancer     Subarachnoid hemorrhage     Vasodepressor syncope          PAST SURGICAL HISTORY  Past Surgical History:   Procedure Laterality Date    APPENDECTOMY N/A     BLEPHAROPLASTY Bilateral     BREAST CYST EXCISION      to remove cysts    BREAST IMPLANT SURGERY Bilateral 05/20/2002    Dr. Marissa Cobos,  [place]    BUNIONECTOMY Bilateral     CATARACT EXTRACTION Bilateral     COLONOSCOPY N/A 9/21/2018    Procedure: COLONOSCOPY into cecum and T.I. with cold biopsy polypectomy;  Surgeon: Roly Be MD;  Location: [facility] ENDOSCOPY;  Service: Gastroenterology    COLONOSCOPY N/A 2/7/2024    Procedure: COLONOSCOPY to cecum and ti;  Surgeon: Roly Be MD;  Location: [facility] ENDOSCOPY;  Service: Gastroenterology;  Laterality: N/A;  pre- abnormal imaging   post-  diverticulosis    CORRECTION HAMMER TOE Left 2013    Revision correction through an altered surgical field of the left second hammertoe, correciton of the left third hammertoe, DuVries plantar condylectomy of the left second metatarsal, Plantar DuVries condylectomy of the left third metatarsal, partial excision of the left fifth metatarsal head for alleviation of bunionette deformity-Dr. Jordan Montilla, [facility]    ENDOSCOPY N/A 2018    Procedure: ESOPHAGOGASTRODUODENOSCOPY with esophageal dilation #54 Castellanos;  Surgeon: Roly Be MD;  Location: [facility] ENDOSCOPY;  Service: Gastroenterology    ENDOSCOPY AND COLONOSCOPY N/A 2010    Hiatal hernia, chronic gastritis: biopsied, normal examined duodenum, normal examined ileum, mild diverticulosis sigmoid colon-Dr. Roly Be, [facility]    TOTAL ABDOMINAL HYSTERECTOMY Bilateral          FAMILY HISTORY  Family History   Problem Relation Age of Onset    Hypertension Mother     Heart disease Mother     Arthritis Mother     Colon cancer Mother         Colon    Colon polyps Mother     Heart attack Mother     Hypertension Father     Heart disease Father     Prostate cancer Father     Heart attack Father     COPD Sister     Hypertension Brother     Heart disease Brother          of heart attack (2 brothers)    Tuberculosis Brother     Heart disease Brother     Diabetes Paternal Aunt     Malig Hyperthermia Neg Hx          SOCIAL HISTORY  Social History     Socioeconomic History    Marital status:     Number of children: 3   Tobacco Use    Smoking status: Former     Current packs/day: 0.00     Average packs/day: 0.3 packs/day for 2.0 years (0.5 ttl pk-yrs)     Types: Cigarettes     Start date: 10/10/1992     Quit date: 10/10/1994     Years since quittin.4     Passive exposure: Past    Smokeless tobacco: Never   Vaping Use    Vaping status: Never Used   Substance and Sexual Activity    Alcohol use: Never     Alcohol/week: 1.0 standard drink of alcohol      Types: 1 Glasses of wine per week    Drug use: No    Sexual activity: Defer         ALLERGIES  Gatifloxacin, Rosuvastatin, and Bactrim [sulfamethoxazole-trimethoprim]      REVIEW OF SYSTEMS  Included in HPI  All systems reviewed and negative except for those discussed in HPI.      PHYSICAL EXAM    I have reviewed the triage vital signs and nursing notes.    ED Triage Vitals   Temp Heart Rate Resp BP SpO2   03/26/24 2150 03/26/24 2150 03/26/24 2150 03/26/24 2229 03/26/24 2150   98.1 °F (36.7 °C) 92 16 (!) 184/87 97 %      Temp src Heart Rate Source Patient Position BP Location FiO2 (%)   03/26/24 2150 -- -- 03/26/24 2229 --   Tympanic   Left arm        GENERAL: not distressed  HENT: nares patent  Head/neck/ face are symmetric without gross deformity or swelling  EYES: no scleral icterus, PERRLA  CV: regular rhythm, regular rate with intact distal pulses  RESPIRATORY: normal effort and no respiratory distress  ABDOMEN: soft and non-tender  MUSCULOSKELETAL: no deformity  NEURO: Neuro: Alert and oriented x3, extraocular motion intact, pupils are equal and round reactive to light, cranial nerves II through XII are grossly intact, normal speech, moves all extremities well, 5 out of 5 strength all 4 extremities, sensation intact light touch all 4 extremities, no ataxia.  SKIN: warm, dry    Vital signs and nursing notes reviewed.      LAB RESULTS  Recent Results (from the past 24 hour(s))   ECG 12 Lead Other; HTN    Collection Time: 03/26/24 11:00 PM   Result Value Ref Range    QT Interval 378 ms    QTC Interval 431 ms   Comprehensive Metabolic Panel    Collection Time: 03/27/24 12:48 AM    Specimen: Blood   Result Value Ref Range    Glucose 106 (H) 65 - 99 mg/dL    BUN 10 8 - 23 mg/dL    Creatinine 0.84 0.57 - 1.00 mg/dL    Sodium 137 136 - 145 mmol/L    Potassium 4.2 3.5 - 5.2 mmol/L    Chloride 102 98 - 107 mmol/L    CO2 23.0 22.0 - 29.0 mmol/L    Calcium 9.5 8.6 - 10.5 mg/dL    Total Protein 6.4 6.0 - 8.5 g/dL     Albumin 4.0 3.5 - 5.2 g/dL    ALT (SGPT) 18 1 - 33 U/L    AST (SGOT) 20 1 - 32 U/L    Alkaline Phosphatase 87 39 - 117 U/L    Total Bilirubin 0.3 0.0 - 1.2 mg/dL    Globulin 2.4 gm/dL    A/G Ratio 1.7 g/dL    BUN/Creatinine Ratio 11.9 7.0 - 25.0    Anion Gap 12.0 5.0 - 15.0 mmol/L    eGFR 67.8 >60.0 mL/min/1.73   Single High Sensitivity Troponin T    Collection Time: 03/27/24 12:48 AM    Specimen: Blood   Result Value Ref Range    HS Troponin T 11 <14 ng/L   CBC Auto Differential    Collection Time: 03/27/24 12:48 AM    Specimen: Blood   Result Value Ref Range    WBC 10.05 3.40 - 10.80 10*3/mm3    RBC 5.18 3.77 - 5.28 10*6/mm3    Hemoglobin 14.7 12.0 - 15.9 g/dL    Hematocrit 46.5 34.0 - 46.6 %    MCV 89.8 79.0 - 97.0 fL    MCH 28.4 26.6 - 33.0 pg    MCHC 31.6 31.5 - 35.7 g/dL    RDW 12.6 12.3 - 15.4 %    RDW-SD 40.7 37.0 - 54.0 fl    MPV 10.2 6.0 - 12.0 fL    Platelets 336 140 - 450 10*3/mm3    Neutrophil % 63.2 42.7 - 76.0 %    Lymphocyte % 26.7 19.6 - 45.3 %    Monocyte % 7.1 5.0 - 12.0 %    Eosinophil % 1.8 0.3 - 6.2 %    Basophil % 0.5 0.0 - 1.5 %    Immature Grans % 0.7 (H) 0.0 - 0.5 %    Neutrophils, Absolute 6.36 1.70 - 7.00 10*3/mm3    Lymphocytes, Absolute 2.68 0.70 - 3.10 10*3/mm3    Monocytes, Absolute 0.71 0.10 - 0.90 10*3/mm3    Eosinophils, Absolute 0.18 0.00 - 0.40 10*3/mm3    Basophils, Absolute 0.05 0.00 - 0.20 10*3/mm3    Immature Grans, Absolute 0.07 (H) 0.00 - 0.05 10*3/mm3    nRBC 0.0 0.0 - 0.2 /100 WBC         RADIOLOGY  CT venogram head    Result Date: 3/27/2024  Patient: PATRICK LANDAVERDE  Time Out: 02:08 Exam(s): CT VENOGRAPHY EXAM:   CT Head Without Intravenous Contrast CLINICAL HISTORY:    Reason for exam: posterior headache, sudden onset. TECHNIQUE:   Axial computed tomography images of the head brain without intravenous contrast.  CTDI is 55.71 mGy and DLP is 935.3 mGy-cm.  This CT exam was performed according to the principle of ALARA (As Low As Reasonably Achievable) by using one or  more of the following dose reduction techniques: automated exposure control, adjustment of the mA and or kV according to patient size, and or use of iterative reconstruction technique. COMPARISON:   No relevant prior studies available. FINDINGS:   Superior sagittal sinus: Patent. Transverse sinus: Patent bilaterally.  Hypoplastic on the right side and severely stenotic due to arachnoid granulation Sigmoid sinus: Patent bilaterally. Internal jugular vein: Patent bilaterally. IMPRESSION:       No venous sinus thrombosis.  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    Electronically signed by Caty Joseph MD on 03-27-24 at 0208    CT Angiogram Head    Result Date: 3/27/2024  Patient: PATRICK LANDAVERDE  Time Out: 02:07 Exam(s): CTA HEAD EXAM:   CT Angiography Head Without and With Intravenous Contrast CLINICAL HISTORY:    Reason for exam: sudden onset headache. TECHNIQUE: AI analysis of LVO was utilized   Axial computed tomographic angiography images of the head without and with intravenous contrast.  CTDI is 16.67 mGy and DLP is 295.5 mGy-cm.  This CT exam was performed according to the principle of ALARA (As Low As Reasonably Achievable) by using one or more of the following dose reduction techniques: automated exposure control, adjustment of the mA and or kV according to patient size, and or use of iterative reconstruction technique.   MIP reconstructed images were created and reviewed. COMPARISON:   No relevant prior studies available. FINDINGS:  VASCULATURE:   Right internal carotid artery:  No significant stenosis.  No aneurysm.   Right anterior cerebral artery:  No occlusion or significant stenosis.  No aneurysm.   Right middle cerebral artery:  No occlusion or significant stenosis.  No aneurysm.   Right posterior cerebral artery:  No occlusion or significant stenosis.  No aneurysm.   Right vertebral artery:  Unremarkable.   Left internal carotid artery:  No significant  stenosis.  No aneurysm.   Left anterior cerebral artery:  No occlusion or significant stenosis.  No aneurysm.   Left middle cerebral artery:  No occlusion or significant stenosis.  No aneurysm.   Left posterior cerebral artery:  No occlusion or significant stenosis.  No aneurysm.   Left vertebral artery:  Unremarkable.   Basilar artery:  No occlusion or significant stenosis.  No aneurysm.  HEAD:   Brain:  No hemorrhage.  No edema.  Normal enhancement.   Ventricles:  Unremarkable.  No ventriculomegaly.   Bones joints:  No acute fracture.   Soft tissues:  Unremarkable.   Sinuses:  Unremarkable.  No acute sinusitis.   Mastoid air cells:  Unremarkable.  No mastoid effusion. IMPRESSION:       No significant stenosis. No hemorrhage, hydrocephalus, mass effect, or herniation.    Electronically signed by Caty Joseph MD on 03-27-24 at 0207       MEDICATIONS GIVEN IN ER  Medications   sodium chloride 0.9 % flush 10 mL (has no administration in time range)   sodium chloride 0.9 % bolus 500 mL (0 mL Intravenous Stopped 3/27/24 0243)   diphenhydrAMINE (BENADRYL) injection 12.5 mg (12.5 mg Intravenous Given 3/27/24 0051)   prochlorperazine (COMPAZINE) injection 5 mg (5 mg Intravenous Given 3/27/24 0050)   iopamidol (ISOVUE-370) 76 % injection 95 mL (95 mL Intravenous Given 3/27/24 0133)           OUTPATIENT MEDICATION MANAGEMENT:  Current Facility-Administered Medications Ordered in Epic   Medication Dose Route Frequency Provider Last Rate Last Admin    sodium chloride 0.9 % flush 10 mL  10 mL Intravenous PRN Lian Tom APRN         Current Outpatient Medications Ordered in Epic   Medication Sig Dispense Refill    acetaminophen (TYLENOL) 500 MG tablet Take 1 tablet by mouth Every 6 (Six) Hours As Needed for Mild Pain. 30 tablet 0    amitriptyline (ELAVIL) 25 MG tablet Take 1 tablet by mouth Every Night. 90 tablet 1    amLODIPine (NORVASC) 5 MG tablet Take 1 tablet by mouth Daily. (Patient taking differently: Take 1  tablet by mouth Every Night.) 30 tablet 6    aspirin 81 MG chewable tablet Chew 1 tablet Daily.      atorvastatin (LIPITOR) 20 MG tablet TAKE 1 TABLET BY MOUTH EVERY NIGHT 90 tablet 1    CRANBERRY PO Take  by mouth.      esomeprazole (nexIUM) 40 MG capsule TAKE 1 CAPSULE BY MOUTH EVERY MORNING BEFORE BREAKFAST 90 capsule 1    estradiol (ESTRACE VAGINAL) 0.1 MG/GM vaginal cream Insert 1 g into the vagina 2 (Two) Times a Week. 42.5 g 6    levothyroxine (SYNTHROID, LEVOTHROID) 75 MCG tablet TAKE 1 TABLET BY MOUTH DAILY 90 tablet 1    lisinopril (PRINIVIL,ZESTRIL) 40 MG tablet Take 1 tablet by mouth Daily. 90 tablet 0    loratadine (CLARITIN) 10 MG tablet Take 1 tablet by mouth Daily.      multivitamin (THERAGRAN) tablet tablet Take  by mouth Daily.      neomycin-polymyxin-dexamethamethasone (POLYDEX) 3.5-27477-7.1 ointment ophthalmic ointment APPLY 1 APPLICATION IN LEFT EYE THREE TIMES DAILY      nitrofurantoin, macrocrystal-monohydrate, (MACROBID) 100 MG capsule Take 1 capsule by mouth Daily.      VITAMIN D PO Take  by mouth.               PROGRESS, DATA ANALYSIS, CONSULTS, AND MEDICAL DECISION MAKING  ORDERS PLACED DURING THIS VISIT:  Orders Placed This Encounter   Procedures    CT Angiogram Head    CT venogram head    Comprehensive Metabolic Panel    Single High Sensitivity Troponin T    CBC Auto Differential    Monitor Blood Pressure    Continuous Pulse Oximetry    ECG 12 Lead Other; HTN    Insert Peripheral IV    CBC & Differential       All labs have been independently interpreted by me.  All radiology studies have been reviewed by me. All EKG's have been independently viewed by me.  Discussion below represents my analysis of pertinent findings related to patient's condition, differential diagnosis, treatment plan and final disposition.    Differential diagnosis includes but is not limited to:   Complex migraine, tension headache, electrolyte imbalance, ICH, etc.    ED Course/Progress Notes/MDM:  ED Course as of  03/27/24 0320   Wed Mar 27, 2024   0048 I discussed the case with Dr. Herrera and they agree to evaluate the patient at the bedside.    [AR]   0051 #20g IV to the right AC x 1 time placed by me.  Patient tolerated well. [AR]   0158 EKG interpreted myself:  2300, sinus rhythm rate of 78, no acute ST segment changes or T wave inversions. [FS]   0212 CT head interpreted myself:  No hemorrhage or midline shift [FS]   0308 BP: 123/64 [AR]   0314 The patient was reexamined.  They have had symptomatic improvement during their ED stay.  I discussed today's findings with the patient, explaining the pertinent positives and negatives from today's visit, and the plan of care.  Discussed plan for discharge as there is no emergent indication for admission.  Discussed limitation of the ED work-up and that this is to rule out life-threatening emergencies but that they could require further testing as determined by their primary care and or any referred specialist patient is agreeable and understands need for follow-up and repeat exam/testing.  Patient is aware that discharge does not mean there is nothing wrong, indicates no emergency is present, and that they must continue their care with their primary care physician and/or any referred specialist.  They were given appropriate follow-up with their primary care physician and/or specialist.  I had an extensive discussion on the expected clinical course and return precautions.  Patient understands to return to the emergency department for continuation, worsening, or new symptoms.  I answered any of the patient's questions. Patient was discharged home in a stable condition.  Ambulatory with steady gait.   [AR]      ED Course User Index  [AR] Lian Tom APRN  [FS] Nitish Herrera MD     Discharge instructions:  You have been given emergency department evaluation.  This evaluation is intended to rule out life-threatening conditions.  Is not a complete evaluation.  You could require  further testing as determined by your primary care physician or any referred specialist.  Please follow-up with all doctors that you are referred to.  Rest.  Drink plenty of fluids, stay hydrated.  Please be sure to take your prescribed medications and follow any specific instructions in the discharge instructions.  Please follow-up with your primary care physician within 48 hours.  Please have your primary care provider recheck your blood pressure.  Please return to the emergency department if you experience chest pain, shortness of breath, abdominal pain, fever greater than 102, intractable vomiting.  Please return to the emergency department if your symptoms continue or worsen, or if you begin to experience any other concerning symptom.      AS OF 03:14 EDT VITALS:    BP - 123/64  HR - 74  TEMP - 98.1 °F (36.7 °C) (Tympanic)  O2 SATS - 97%    MDM:  Patient is an 88-year-old female who presents to the emergency department with complaints of a left occipital headache followed by lightheadedness and noted elevated blood pressure.  Labs and imaging unremarkable.  Blood pressure improved after migraine cocktail.  Patient reports symptom relief while in ED.  Patient will be discharged home, she is agreeable.  Patient is ambulatory with steady gait.  Strict return precautions given.      COMPLEXITY OF CARE  Admission was considered but after careful review of the patient's presentation, physical examination, diagnostic results, and response to treatment the patient may be safely discharged with outpatient follow-up.        DIAGNOSIS  Final diagnoses:   Acute nonintractable headache, unspecified headache type   Light headedness   Elevated blood pressure reading         DISPOSITION  ED Disposition       ED Disposition   Discharge    Condition   Stable    Comment   --                    Please note that portions of this document were completed with a voice recognition program.    Note Disclaimer: At [facility], we  believe that sharing information builds trust and better relationships. You are receiving this note because you recently visited [facility]. It is possible you will see health information before a provider has talked with you about it. This kind of information can be easy to misunderstand. To help you fully understand what it means for your health, we urge you to discuss this note with your provider.     Lian Tom, ED  03/27/24 0321

## 2024-03-27 NOTE — DISCHARGE INSTRUCTIONS
You have been given emergency department evaluation.  This evaluation is intended to rule out life-threatening conditions.  Is not a complete evaluation.  You could require further testing as determined by your primary care physician or any referred specialist.  Please follow-up with all doctors that you are referred to.  Rest.  Drink plenty of fluids, stay hydrated.  Please be sure to take your prescribed medications and follow any specific instructions in the discharge instructions.  Please follow-up with your primary care physician within 48 hours.  Please have your primary care provider recheck your blood pressure.  Please return to the emergency department if you experience chest pain, shortness of breath, abdominal pain, fever greater than 102, intractable vomiting.  Please return to the emergency department if your symptoms continue or worsen, or if you begin to experience any other concerning symptom.

## 2024-03-28 ENCOUNTER — OFFICE VISIT (OUTPATIENT)
Dept: INTERNAL MEDICINE | Facility: CLINIC | Age: 89
End: 2024-03-28
Payer: MEDICARE

## 2024-03-28 VITALS
SYSTOLIC BLOOD PRESSURE: 128 MMHG | WEIGHT: 160 LBS | BODY MASS INDEX: 28.35 KG/M2 | HEIGHT: 63 IN | OXYGEN SATURATION: 99 % | HEART RATE: 79 BPM | DIASTOLIC BLOOD PRESSURE: 64 MMHG

## 2024-03-28 DIAGNOSIS — F41.9 ANXIETY: Primary | ICD-10-CM

## 2024-03-28 DIAGNOSIS — D49.9 NEOPLASM: ICD-10-CM

## 2024-03-28 PROCEDURE — 99214 OFFICE O/P EST MOD 30 MIN: CPT | Performed by: NURSE PRACTITIONER

## 2024-03-28 RX ORDER — SERTRALINE HYDROCHLORIDE 25 MG/1
25 TABLET, FILM COATED ORAL DAILY
Qty: 30 TABLET | Refills: 6 | Status: SHIPPED | OUTPATIENT
Start: 2024-03-28

## 2024-03-28 NOTE — PROGRESS NOTES
Subjective   Isabel Handy is a 88 y.o. female.      History of Present Illness   The patient is here today to F/U on lab work. Did not have lab work drawn.   Her son is worried about her getting hyper and gets worried about things. She has been to the ER for HTN and headaches. Wonders if this is related.     She has tried lexapro but caused heart fluttering.     Has a growth on her left forearm that is itchy. She scratched it a while ago and it has not healed.     The following portions of the patient's history were reviewed and updated as appropriate: allergies, current medications, past family history, past medical history, past social history, past surgical history and problem list.    Review of Systems   Constitutional: Negative.    Respiratory: Negative.     Cardiovascular:  Positive for leg swelling (at baseline). Negative for chest pain and palpitations.   Psychiatric/Behavioral:  Positive for depressed mood. Negative for suicidal ideas. The patient is nervous/anxious.        Objective   Physical Exam  Constitutional:       Appearance: Normal appearance. She is well-developed.   Neck:      Thyroid: No thyromegaly.   Cardiovascular:      Rate and Rhythm: Normal rate and regular rhythm.      Heart sounds: Normal heart sounds. Murmur heard.      Systolic murmur is present with a grade of 3/6.   Pulmonary:      Effort: Pulmonary effort is normal.      Breath sounds: Normal breath sounds.   Musculoskeletal:      Cervical back: Normal range of motion and neck supple.      Right lower leg: Edema (mod) present.      Left lower leg: Edema (trace- mild) present.   Lymphadenopathy:      Cervical: No cervical adenopathy.   Skin:     General: Skin is warm and dry.             Comments: Tiny raised pink papular area, some area is open, slight erythema surrounding    Neurological:      Mental Status: She is alert.   Psychiatric:         Mood and Affect: Mood is anxious.         Behavior: Behavior normal.         Thought  Content: Thought content normal.         Judgment: Judgment normal.         Vitals:    03/28/24 1352   BP: 128/64   Pulse: 79   SpO2: 99%     Body mass index is 28.35 kg/m².      Current Outpatient Medications:     acetaminophen (TYLENOL) 500 MG tablet, Take 1 tablet by mouth Every 6 (Six) Hours As Needed for Mild Pain., Disp: 30 tablet, Rfl: 0    amitriptyline (ELAVIL) 25 MG tablet, Take 1 tablet by mouth Every Night., Disp: 90 tablet, Rfl: 1    amLODIPine (NORVASC) 5 MG tablet, Take 1 tablet by mouth Daily. (Patient taking differently: Take 1 tablet by mouth Every Night.), Disp: 30 tablet, Rfl: 6    aspirin 81 MG chewable tablet, Chew 1 tablet Daily., Disp: , Rfl:     atorvastatin (LIPITOR) 20 MG tablet, TAKE 1 TABLET BY MOUTH EVERY NIGHT, Disp: 90 tablet, Rfl: 1    CRANBERRY PO, Take  by mouth., Disp: , Rfl:     esomeprazole (nexIUM) 40 MG capsule, TAKE 1 CAPSULE BY MOUTH EVERY MORNING BEFORE BREAKFAST, Disp: 90 capsule, Rfl: 1    estradiol (ESTRACE VAGINAL) 0.1 MG/GM vaginal cream, Insert 1 g into the vagina 2 (Two) Times a Week., Disp: 42.5 g, Rfl: 6    levothyroxine (SYNTHROID, LEVOTHROID) 75 MCG tablet, TAKE 1 TABLET BY MOUTH DAILY, Disp: 90 tablet, Rfl: 1    lisinopril (PRINIVIL,ZESTRIL) 40 MG tablet, Take 1 tablet by mouth Daily., Disp: 90 tablet, Rfl: 0    loratadine (CLARITIN) 10 MG tablet, Take 1 tablet by mouth Daily., Disp: , Rfl:     multivitamin (THERAGRAN) tablet tablet, Take  by mouth Daily., Disp: , Rfl:     neomycin-polymyxin-dexamethamethasone (POLYDEX) 3.5-69675-6.1 ointment ophthalmic ointment, APPLY 1 APPLICATION IN LEFT EYE THREE TIMES DAILY, Disp: , Rfl:     VITAMIN D PO, Take  by mouth., Disp: , Rfl:    Admission on 03/26/2024, Discharged on 03/27/2024   Component Date Value Ref Range Status    QT Interval 03/26/2024 378  ms Final    QTC Interval 03/26/2024 431  ms Final    Glucose 03/27/2024 106 (H)  65 - 99 mg/dL Final    BUN 03/27/2024 10  8 - 23 mg/dL Final    Creatinine 03/27/2024  0.84  0.57 - 1.00 mg/dL Final    Sodium 03/27/2024 137  136 - 145 mmol/L Final    Potassium 03/27/2024 4.2  3.5 - 5.2 mmol/L Final    Slight hemolysis detected by analyzer. Result may be falsely elevated.    Chloride 03/27/2024 102  98 - 107 mmol/L Final    CO2 03/27/2024 23.0  22.0 - 29.0 mmol/L Final    Calcium 03/27/2024 9.5  8.6 - 10.5 mg/dL Final    Total Protein 03/27/2024 6.4  6.0 - 8.5 g/dL Final    Albumin 03/27/2024 4.0  3.5 - 5.2 g/dL Final    ALT (SGPT) 03/27/2024 18  1 - 33 U/L Final    AST (SGOT) 03/27/2024 20  1 - 32 U/L Final    Alkaline Phosphatase 03/27/2024 87  39 - 117 U/L Final    Total Bilirubin 03/27/2024 0.3  0.0 - 1.2 mg/dL Final    Globulin 03/27/2024 2.4  gm/dL Final    A/G Ratio 03/27/2024 1.7  g/dL Final    BUN/Creatinine Ratio 03/27/2024 11.9  7.0 - 25.0 Final    Anion Gap 03/27/2024 12.0  5.0 - 15.0 mmol/L Final    eGFR 03/27/2024 67.8  >60.0 mL/min/1.73 Final    HS Troponin T 03/27/2024 11  <14 ng/L Final    WBC 03/27/2024 10.05  3.40 - 10.80 10*3/mm3 Final    RBC 03/27/2024 5.18  3.77 - 5.28 10*6/mm3 Final    Hemoglobin 03/27/2024 14.7  12.0 - 15.9 g/dL Final    Hematocrit 03/27/2024 46.5  34.0 - 46.6 % Final    MCV 03/27/2024 89.8  79.0 - 97.0 fL Final    MCH 03/27/2024 28.4  26.6 - 33.0 pg Final    MCHC 03/27/2024 31.6  31.5 - 35.7 g/dL Final    RDW 03/27/2024 12.6  12.3 - 15.4 % Final    RDW-SD 03/27/2024 40.7  37.0 - 54.0 fl Final    MPV 03/27/2024 10.2  6.0 - 12.0 fL Final    Platelets 03/27/2024 336  140 - 450 10*3/mm3 Final    Neutrophil % 03/27/2024 63.2  42.7 - 76.0 % Final    Lymphocyte % 03/27/2024 26.7  19.6 - 45.3 % Final    Monocyte % 03/27/2024 7.1  5.0 - 12.0 % Final    Eosinophil % 03/27/2024 1.8  0.3 - 6.2 % Final    Basophil % 03/27/2024 0.5  0.0 - 1.5 % Final    Immature Grans % 03/27/2024 0.7 (H)  0.0 - 0.5 % Final    Neutrophils, Absolute 03/27/2024 6.36  1.70 - 7.00 10*3/mm3 Final    Lymphocytes, Absolute 03/27/2024 2.68  0.70 - 3.10 10*3/mm3 Final     Monocytes, Absolute 03/27/2024 0.71  0.10 - 0.90 10*3/mm3 Final    Eosinophils, Absolute 03/27/2024 0.18  0.00 - 0.40 10*3/mm3 Final    Basophils, Absolute 03/27/2024 0.05  0.00 - 0.20 10*3/mm3 Final    Immature Grans, Absolute 03/27/2024 0.07 (H)  0.00 - 0.05 10*3/mm3 Final    nRBC 03/27/2024 0.0  0.0 - 0.2 /100 WBC Final      Assessment & Plan   There are no diagnoses linked to this encounter.           1. Anxiety- try zoloft 25 mg daily, aware of dosing and SEs, f/u in 4 weeks.   2. Neoplasm- will place derm referral

## 2024-04-03 LAB — CREAT BLDA-MCNC: 0.7 MG/DL (ref 0.6–1.3)

## 2024-04-04 ENCOUNTER — TELEPHONE (OUTPATIENT)
Dept: INTERNAL MEDICINE | Facility: CLINIC | Age: 89
End: 2024-04-04

## 2024-04-04 NOTE — TELEPHONE ENCOUNTER
Caller: Isabel Handy    Relationship: Self    Best call back number: [phone]    What medication are you requesting: MEDICATION FOR UTI    What are your current symptoms: BURNING URINATION, DISCOMFORT     Have you had these symptoms before:    [x] Yes  [] No    Have you been treated for these symptoms before:   [x] Yes  [] No    If a prescription is needed, what is your preferred pharmacy and phone number: [facility] #[id number] - [facility] [address] - [phone]  - [phone]     Additional notes: PLEASE ADVISE.

## 2024-04-05 ENCOUNTER — OFFICE VISIT (OUTPATIENT)
Dept: INTERNAL MEDICINE | Facility: CLINIC | Age: 89
End: 2024-04-05
Payer: MEDICARE

## 2024-04-05 VITALS
TEMPERATURE: 98 F | DIASTOLIC BLOOD PRESSURE: 72 MMHG | HEART RATE: 79 BPM | WEIGHT: 160 LBS | OXYGEN SATURATION: 96 % | SYSTOLIC BLOOD PRESSURE: 130 MMHG | HEIGHT: 63 IN | BODY MASS INDEX: 28.35 KG/M2

## 2024-04-05 DIAGNOSIS — N30.01 ACUTE CYSTITIS WITH HEMATURIA: Primary | ICD-10-CM

## 2024-04-05 DIAGNOSIS — R31.0 GROSS HEMATURIA: ICD-10-CM

## 2024-04-05 LAB
BILIRUB BLD-MCNC: NEGATIVE MG/DL
CLARITY, POC: ABNORMAL
COLOR UR: YELLOW
EXPIRATION DATE: ABNORMAL
GLUCOSE UR STRIP-MCNC: NEGATIVE MG/DL
KETONES UR QL: NEGATIVE
LEUKOCYTE EST, POC: ABNORMAL
Lab: ABNORMAL
NITRITE UR-MCNC: NEGATIVE MG/ML
PH UR: 7 [PH] (ref 5–8)
PROT UR STRIP-MCNC: NEGATIVE MG/DL
RBC # UR STRIP: ABNORMAL /UL
SP GR UR: 1.01 (ref 1–1.03)
UROBILINOGEN UR QL: ABNORMAL

## 2024-04-05 PROCEDURE — 1160F RVW MEDS BY RX/DR IN RCRD: CPT | Performed by: NURSE PRACTITIONER

## 2024-04-05 PROCEDURE — 99214 OFFICE O/P EST MOD 30 MIN: CPT | Performed by: NURSE PRACTITIONER

## 2024-04-05 PROCEDURE — 1159F MED LIST DOCD IN RCRD: CPT | Performed by: NURSE PRACTITIONER

## 2024-04-05 PROCEDURE — 81003 URINALYSIS AUTO W/O SCOPE: CPT | Performed by: NURSE PRACTITIONER

## 2024-04-05 RX ORDER — NITROFURANTOIN 25; 75 MG/1; MG/1
100 CAPSULE ORAL 2 TIMES DAILY
Qty: 14 CAPSULE | Refills: 0 | Status: SHIPPED | OUTPATIENT
Start: 2024-04-05

## 2024-04-05 NOTE — PROGRESS NOTES
Subjective   Isabel Handy is a 88 y.o. female. Patient is here today for   Chief Complaint   Patient presents with    Blood in Urine    Urinary Tract Infection   .    History of Present Illness   C/o blood in urine for several days associated with burning with urination, frequency.  Patient is concerned because she had a UTI about a month ago.  At that time she was treated with Macrobid.  States that symptoms did improve.  She is seeing urology and is on an estrogen cream.  States that several years ago, she had a urethral dilation which did help for a while; she was seeing a urologist in [place].  She is requesting a referral to another urologist for a second opinion.    The following portions of the patient's history were reviewed and updated as appropriate: allergies, current medications, past family history, past medical history, past social history, past surgical history and problem list.    Review of Systems    Objective   Vitals:    04/05/24 1103   BP: 130/72   Pulse: 79   Temp: 98 °F (36.7 °C)   SpO2: 96%     Body mass index is 28.35 kg/m².  Physical Exam  Vitals and nursing note reviewed.   Constitutional:       Appearance: Normal appearance. She is well-developed.   Cardiovascular:      Rate and Rhythm: Normal rate and regular rhythm.      Heart sounds: Normal heart sounds.   Pulmonary:      Effort: Pulmonary effort is normal.      Breath sounds: Normal breath sounds.   Skin:     General: Skin is warm and dry.   Neurological:      Mental Status: She is alert and oriented to person, place, and time.   Psychiatric:         Speech: Speech normal.         Behavior: Behavior normal.         Thought Content: Thought content normal.       Results for orders placed or performed in visit on 04/05/24   POCT urinalysis dipstick, automated    Specimen: Urine   Result Value Ref Range    Color Yellow Yellow, Straw, Dark Yellow, Josephine    Clarity, UA Cloudy (A) Clear    Specific Gravity  1.010 1.005 - 1.030    pH,  Urine 7.0 5.0 - 8.0    Leukocytes Large (3+) (A) Negative    Nitrite, UA Negative Negative    Protein, POC Negative Negative mg/dL    Glucose, UA Negative Negative mg/dL    Ketones, UA Negative Negative    Urobilinogen, UA 0.2 E.U./dL Normal, 0.2 E.U./dL    Bilirubin Negative Negative    Blood, UA Trace (A) Negative    Lot Number [id number]     Expiration Date 12,312,024        Assessment & Plan   Diagnoses and all orders for this visit:    1. Acute cystitis with hematuria (Primary)  -     nitrofurantoin, macrocrystal-monohydrate, (Macrobid) 100 MG capsule; Take 1 capsule by mouth 2 (Two) Times a Day.  Dispense: 14 capsule; Refill: 0  -     Ambulatory Referral to Urology    2. Gross hematuria  -     POCT urinalysis dipstick, automated  -     Urine Culture - Urine, Urine, Clean Catch  -     Ambulatory Referral to Urology    Due to patient's allergies to Bactrim and gatifloxacin, will treat UTI with Macrobid.  Will call patient with urine culture results.  Will also place a referral to [facility] urology.

## 2024-04-09 LAB
BACTERIA UR CULT: ABNORMAL
BACTERIA UR CULT: ABNORMAL
OTHER ANTIBIOTIC SUSC ISLT: ABNORMAL

## 2024-04-15 RX ORDER — ESOMEPRAZOLE MAGNESIUM 40 MG/1
40 CAPSULE, DELAYED RELEASE ORAL
Qty: 90 CAPSULE | Refills: 1 | Status: SHIPPED | OUTPATIENT
Start: 2024-04-15

## 2024-04-25 LAB
25(OH)D3+25(OH)D2 SERPL-MCNC: 44.5 NG/ML (ref 30–100)
ALBUMIN SERPL-MCNC: 4.5 G/DL (ref 3.5–5.2)
ALBUMIN/GLOB SERPL: 2 G/DL
ALP SERPL-CCNC: 90 U/L (ref 39–117)
ALT SERPL-CCNC: 12 U/L (ref 1–33)
AST SERPL-CCNC: 18 U/L (ref 1–32)
BILIRUB SERPL-MCNC: 0.7 MG/DL (ref 0–1.2)
BUN SERPL-MCNC: 12 MG/DL (ref 8–23)
BUN/CREAT SERPL: 9.7 (ref 7–25)
CALCIUM SERPL-MCNC: 9.9 MG/DL (ref 8.6–10.5)
CHLORIDE SERPL-SCNC: 98 MMOL/L (ref 98–107)
CHOLEST SERPL-MCNC: 182 MG/DL (ref 0–200)
CO2 SERPL-SCNC: 22.5 MMOL/L (ref 22–29)
CREAT SERPL-MCNC: 1.24 MG/DL (ref 0.57–1)
EGFRCR SERPLBLD CKD-EPI 2021: 42.5 ML/MIN/1.73
GLOBULIN SER CALC-MCNC: 2.2 GM/DL
GLUCOSE SERPL-MCNC: 83 MG/DL (ref 65–99)
HBA1C MFR BLD: 5.5 % (ref 4.8–5.6)
HDLC SERPL-MCNC: 86 MG/DL (ref 40–60)
LDLC SERPL CALC-MCNC: 80 MG/DL (ref 0–100)
LDLC/HDLC SERPL: 0.9 {RATIO}
POTASSIUM SERPL-SCNC: 4.2 MMOL/L (ref 3.5–5.2)
PROT SERPL-MCNC: 6.7 G/DL (ref 6–8.5)
SODIUM SERPL-SCNC: 135 MMOL/L (ref 136–145)
TRIGL SERPL-MCNC: 91 MG/DL (ref 0–150)
TSH SERPL DL<=0.005 MIU/L-ACNC: 1.27 UIU/ML (ref 0.27–4.2)
VLDLC SERPL CALC-MCNC: 16 MG/DL (ref 5–40)

## 2024-05-02 ENCOUNTER — OFFICE VISIT (OUTPATIENT)
Dept: INTERNAL MEDICINE | Facility: CLINIC | Age: 89
End: 2024-05-02
Payer: MEDICARE

## 2024-05-02 VITALS
WEIGHT: 157 LBS | DIASTOLIC BLOOD PRESSURE: 68 MMHG | SYSTOLIC BLOOD PRESSURE: 122 MMHG | HEIGHT: 63 IN | HEART RATE: 75 BPM | BODY MASS INDEX: 27.82 KG/M2 | OXYGEN SATURATION: 98 %

## 2024-05-02 DIAGNOSIS — M25.552 PAIN OF LEFT HIP: ICD-10-CM

## 2024-05-02 DIAGNOSIS — F41.9 ANXIETY: Primary | ICD-10-CM

## 2024-05-02 DIAGNOSIS — M54.2 NECK PAIN: ICD-10-CM

## 2024-05-02 DIAGNOSIS — I10 ESSENTIAL HYPERTENSION: ICD-10-CM

## 2024-05-02 PROCEDURE — 1159F MED LIST DOCD IN RCRD: CPT | Performed by: NURSE PRACTITIONER

## 2024-05-02 PROCEDURE — 99214 OFFICE O/P EST MOD 30 MIN: CPT | Performed by: NURSE PRACTITIONER

## 2024-05-02 PROCEDURE — 1160F RVW MEDS BY RX/DR IN RCRD: CPT | Performed by: NURSE PRACTITIONER

## 2024-05-02 RX ORDER — BUPROPION HYDROCHLORIDE 150 MG/1
150 TABLET ORAL DAILY
Qty: 90 TABLET | Refills: 1 | Status: SHIPPED | OUTPATIENT
Start: 2024-05-02

## 2024-05-02 NOTE — PROGRESS NOTES
Subjective   Isabel Handy is a 88 y.o. female.      History of Present Illness   The patient is here today to F/U on anxiety.  She took zoloft X 2 days and had upset stomach so stopped.       Started with left ear pain a few weeks ago. Went to [facility] and tried tizanidine with no relief. She did try a soft collar from her neighbor which does help some. But its not going away. Going across shoulders and down neck.   The following portions of the patient's history were reviewed and updated as appropriate: allergies, current medications, past family history, past medical history, past social history, past surgical history and problem list.    Review of Systems   Constitutional: Negative.    Respiratory: Negative.     Cardiovascular: Negative.    Musculoskeletal:  Positive for neck pain.       Objective   Physical Exam  Constitutional:       Appearance: Normal appearance. She is well-developed.   Neck:      Thyroid: No thyromegaly.   Cardiovascular:      Rate and Rhythm: Normal rate and regular rhythm.      Heart sounds: Normal heart sounds.   Pulmonary:      Effort: Pulmonary effort is normal.      Breath sounds: Normal breath sounds.   Musculoskeletal:      Cervical back: Normal range of motion and neck supple. Spasms and tenderness present. No swelling, deformity, signs of trauma or bony tenderness. Normal range of motion.        Back:    Lymphadenopathy:      Cervical: No cervical adenopathy.   Skin:     General: Skin is warm and dry.   Neurological:      Mental Status: She is alert.   Psychiatric:         Behavior: Behavior normal.         Thought Content: Thought content normal.         Judgment: Judgment normal.         Vitals:    05/02/24 1400   BP: 122/68   Pulse: 75   SpO2: 98%     Body mass index is 27.82 kg/m².    Current Outpatient Medications:     acetaminophen (TYLENOL) 500 MG tablet, Take 1 tablet by mouth Every 6 (Six) Hours As Needed for Mild Pain., Disp: 30 tablet, Rfl: 0    amitriptyline (ELAVIL) 25  MG tablet, Take 1 tablet by mouth Every Night., Disp: 90 tablet, Rfl: 1    amLODIPine (NORVASC) 5 MG tablet, Take 1 tablet by mouth Daily. (Patient taking differently: Take 1 tablet by mouth Every Night.), Disp: 30 tablet, Rfl: 6    aspirin 81 MG chewable tablet, Chew 1 tablet Daily., Disp: , Rfl:     atorvastatin (LIPITOR) 20 MG tablet, TAKE 1 TABLET BY MOUTH EVERY NIGHT, Disp: 90 tablet, Rfl: 1    CRANBERRY PO, Take  by mouth., Disp: , Rfl:     esomeprazole (nexIUM) 40 MG capsule, TAKE 1 CAPSULE BY MOUTH EVERY MORNING BEFORE BREAKFAST, Disp: 90 capsule, Rfl: 1    estradiol (ESTRACE VAGINAL) 0.1 MG/GM vaginal cream, Insert 1 g into the vagina 2 (Two) Times a Week., Disp: 42.5 g, Rfl: 6    levothyroxine (SYNTHROID, LEVOTHROID) 75 MCG tablet, TAKE 1 TABLET BY MOUTH DAILY, Disp: 90 tablet, Rfl: 1    lisinopril (PRINIVIL,ZESTRIL) 40 MG tablet, Take 1 tablet by mouth Daily., Disp: 90 tablet, Rfl: 0    loratadine (CLARITIN) 10 MG tablet, Take 1 tablet by mouth Daily., Disp: , Rfl:     multivitamin (THERAGRAN) tablet tablet, Take  by mouth Daily., Disp: , Rfl:     neomycin-polymyxin-dexamethamethasone (POLYDEX) 3.5-50245-2.1 ointment ophthalmic ointment, APPLY 1 APPLICATION IN LEFT EYE THREE TIMES DAILY, Disp: , Rfl:     VITAMIN D PO, Take  by mouth., Disp: , Rfl:     buPROPion XL (Wellbutrin XL) 150 MG 24 hr tablet, Take 1 tablet by mouth Daily., Disp: 90 tablet, Rfl: 1     Assessment & Plan   Diagnoses and all orders for this visit:    1. Anxiety (Primary)  -     buPROPion XL (Wellbutrin XL) 150 MG 24 hr tablet; Take 1 tablet by mouth Daily.  Dispense: 90 tablet; Refill: 1    2. Neck pain  -     Ambulatory Referral to Physical Therapy    3. Pain of left hip  -     Ambulatory Referral to Physical Therapy    4. Essential hypertension               1. Anxiety- try wellbutrin 150 mg XL, notify for any AE, could check gene sight testing.   2. Neck and left hip pain- try PT, heat or ice, if not better will need imaging  3.  Elevated creatinine- hydrate well, continue to avoid NSAIDs  4. HTN- well controlled

## 2024-05-02 NOTE — PATIENT INSTRUCTIONS
1. Anxiety- try wellbutrin 150 mg XL, notify for any AE, could check gene sight testing.   2. Neck and left hip pain- try PT, heat or ice, if not better will need imaging  3. Elevated creatinine- hydrate well, continue to avoid NSAIDs  4. HTN- well controlled

## 2024-06-03 ENCOUNTER — OFFICE VISIT (OUTPATIENT)
Dept: INTERNAL MEDICINE | Facility: CLINIC | Age: 89
End: 2024-06-03
Payer: MEDICARE

## 2024-06-03 VITALS
HEART RATE: 71 BPM | WEIGHT: 154 LBS | SYSTOLIC BLOOD PRESSURE: 128 MMHG | OXYGEN SATURATION: 99 % | HEIGHT: 63 IN | DIASTOLIC BLOOD PRESSURE: 70 MMHG | BODY MASS INDEX: 27.29 KG/M2

## 2024-06-03 DIAGNOSIS — F41.9 ANXIETY: ICD-10-CM

## 2024-06-03 DIAGNOSIS — R79.89 ELEVATED SERUM CREATININE: ICD-10-CM

## 2024-06-03 DIAGNOSIS — J40 BRONCHITIS: Primary | ICD-10-CM

## 2024-06-03 PROCEDURE — 1126F AMNT PAIN NOTED NONE PRSNT: CPT | Performed by: NURSE PRACTITIONER

## 2024-06-03 PROCEDURE — 1159F MED LIST DOCD IN RCRD: CPT | Performed by: NURSE PRACTITIONER

## 2024-06-03 PROCEDURE — 99214 OFFICE O/P EST MOD 30 MIN: CPT | Performed by: NURSE PRACTITIONER

## 2024-06-03 PROCEDURE — 1160F RVW MEDS BY RX/DR IN RCRD: CPT | Performed by: NURSE PRACTITIONER

## 2024-06-03 NOTE — PROGRESS NOTES
Subjective   Isabel Handy is a 88 y.o. female.      History of Present Illness   The patient is here today to F/U on anxiety, at last visit wellbutrin started. She took it once or twice but quit while sick with URIs. She was first on a z- pack and then medrol dose pack which helped.     HTN-Pt is doing well with current medication regimen, denies adverse reactions, compliant with medication schedule.     The following portions of the patient's history were reviewed and updated as appropriate: allergies, current medications, past family history, past medical history, past social history, past surgical history and problem list.    Review of Systems   Constitutional: Negative.    HENT: Negative.     Respiratory:  Positive for cough (some at night, improving) and wheezing (a little bit). Negative for shortness of breath.    Cardiovascular: Negative.        Objective   Physical Exam  Constitutional:       Appearance: Normal appearance. She is well-developed.   Neck:      Thyroid: No thyromegaly.   Cardiovascular:      Rate and Rhythm: Normal rate and regular rhythm.      Heart sounds: Normal heart sounds.   Pulmonary:      Effort: Pulmonary effort is normal.      Breath sounds: Normal breath sounds.   Musculoskeletal:      Cervical back: Normal range of motion and neck supple.      Right lower leg: Edema (trace) present.      Left lower leg: No edema.   Lymphadenopathy:      Cervical: No cervical adenopathy.   Skin:     General: Skin is warm and dry.   Neurological:      Mental Status: She is alert.   Psychiatric:         Behavior: Behavior normal.         Thought Content: Thought content normal.         Judgment: Judgment normal.         Vitals:    06/03/24 0948   BP: 128/70   Pulse: 71   SpO2: 99%     Body mass index is 27.29 kg/m².    Current Outpatient Medications:     acetaminophen (TYLENOL) 500 MG tablet, Take 1 tablet by mouth Every 6 (Six) Hours As Needed for Mild Pain., Disp: 30 tablet, Rfl: 0     amitriptyline (ELAVIL) 25 MG tablet, Take 1 tablet by mouth Every Night., Disp: 90 tablet, Rfl: 1    amLODIPine (NORVASC) 5 MG tablet, Take 1 tablet by mouth Daily. (Patient taking differently: Take 1 tablet by mouth Every Night.), Disp: 30 tablet, Rfl: 6    aspirin 81 MG chewable tablet, Chew 1 tablet Daily., Disp: , Rfl:     atorvastatin (LIPITOR) 20 MG tablet, TAKE 1 TABLET BY MOUTH EVERY NIGHT, Disp: 90 tablet, Rfl: 1    CRANBERRY PO, Take  by mouth., Disp: , Rfl:     esomeprazole (nexIUM) 40 MG capsule, TAKE 1 CAPSULE BY MOUTH EVERY MORNING BEFORE BREAKFAST, Disp: 90 capsule, Rfl: 1    estradiol (ESTRACE VAGINAL) 0.1 MG/GM vaginal cream, Insert 1 g into the vagina 2 (Two) Times a Week., Disp: 42.5 g, Rfl: 6    guaiFENesin-codeine (GUAIFENESIN AC) 100-10 MG/5ML liquid, 1 tsp po  q6 hours prn, Disp: 118 mL, Rfl: 0    levothyroxine (SYNTHROID, LEVOTHROID) 75 MCG tablet, TAKE 1 TABLET BY MOUTH DAILY, Disp: 90 tablet, Rfl: 1    lisinopril (PRINIVIL,ZESTRIL) 40 MG tablet, Take 1 tablet by mouth Daily., Disp: 90 tablet, Rfl: 0    loratadine (CLARITIN) 10 MG tablet, Take 1 tablet by mouth Daily., Disp: , Rfl:     methylPREDNISolone (MEDROL) 4 MG dose pack, Take as directed on package instructions., Disp: 21 tablet, Rfl: 0    multivitamin (THERAGRAN) tablet tablet, Take  by mouth Daily., Disp: , Rfl:     neomycin-polymyxin-dexamethamethasone (POLYDEX) 3.5-00328-9.1 ointment ophthalmic ointment, APPLY 1 APPLICATION IN LEFT EYE THREE TIMES DAILY, Disp: , Rfl:     VITAMIN D PO, Take  by mouth., Disp: , Rfl:     buPROPion XL (Wellbutrin XL) 150 MG 24 hr tablet, Take 1 tablet by mouth Daily., Disp: 90 tablet, Rfl: 1     Assessment & Plan   Diagnoses and all orders for this visit:    1. Bronchitis (Primary)    2. Elevated serum creatinine  -     Basic Metabolic Panel    3. Anxiety               1. Bronchitis- resolving, if symptoms return, come in for eval, try benadryl at night for PND,   2. Elevated creatinine- recheck  today   3. Anxiety- ok to re start wellbutrin when ready

## 2024-06-04 LAB
BUN SERPL-MCNC: 13 MG/DL (ref 8–23)
BUN/CREAT SERPL: 16.9 (ref 7–25)
CALCIUM SERPL-MCNC: 10 MG/DL (ref 8.6–10.5)
CHLORIDE SERPL-SCNC: 99 MMOL/L (ref 98–107)
CO2 SERPL-SCNC: 23.6 MMOL/L (ref 22–29)
CREAT SERPL-MCNC: 0.77 MG/DL (ref 0.57–1)
EGFRCR SERPLBLD CKD-EPI 2021: 75.2 ML/MIN/1.73
GLUCOSE SERPL-MCNC: 88 MG/DL (ref 65–99)
POTASSIUM SERPL-SCNC: 4.3 MMOL/L (ref 3.5–5.2)
SODIUM SERPL-SCNC: 138 MMOL/L (ref 136–145)

## 2024-06-28 ENCOUNTER — OFFICE VISIT (OUTPATIENT)
Dept: CARDIOLOGY | Facility: CLINIC | Age: 89
End: 2024-06-28
Payer: MEDICARE

## 2024-06-28 VITALS
RESPIRATION RATE: 18 BRPM | WEIGHT: 153 LBS | HEIGHT: 63 IN | HEART RATE: 76 BPM | OXYGEN SATURATION: 97 % | SYSTOLIC BLOOD PRESSURE: 140 MMHG | BODY MASS INDEX: 27.11 KG/M2 | DIASTOLIC BLOOD PRESSURE: 78 MMHG

## 2024-06-28 DIAGNOSIS — I10 ESSENTIAL HYPERTENSION: Primary | ICD-10-CM

## 2024-06-28 DIAGNOSIS — E78.5 HYPERLIPIDEMIA, UNSPECIFIED HYPERLIPIDEMIA TYPE: ICD-10-CM

## 2024-06-28 DIAGNOSIS — I49.3 PVC'S (PREMATURE VENTRICULAR CONTRACTIONS): ICD-10-CM

## 2024-06-28 PROCEDURE — 99214 OFFICE O/P EST MOD 30 MIN: CPT | Performed by: INTERNAL MEDICINE

## 2024-06-28 PROCEDURE — 1159F MED LIST DOCD IN RCRD: CPT | Performed by: INTERNAL MEDICINE

## 2024-06-28 PROCEDURE — 93000 ELECTROCARDIOGRAM COMPLETE: CPT | Performed by: INTERNAL MEDICINE

## 2024-06-28 PROCEDURE — 1160F RVW MEDS BY RX/DR IN RCRD: CPT | Performed by: INTERNAL MEDICINE

## 2024-06-28 NOTE — PROGRESS NOTES
Date of Office Visit: 2024  Encounter Provider: Jannette Mcmahon MD  Place of Service: [facility]  Patient Name: Isabel Handy  :1935      Patient ID:  Isabel Handy is a 88 y.o. female is here for  followup for PVCs and hypertension.        History of Present Illness    She has history of hypertension, hyperlipidemia, PVCs, left lower extremity DVT in , history of esophageal strictures requiring dilation.     She woke with palpitations morning of 3/15/2016.  She was sent to the chest pain center.  Her troponin was negative.  Her TSH was well-controlled.  She had a dobutamine stress echocardiogram which was done 3/16/2016 which showed no evidence of ischemia, baseline ejection fraction was 64%. She also had a Holter recording done 3/15/2016.  This showed 100 PVCs per hour.  There were rare PACs.     She is very strong family history of cardiac disease.  Both of her parents of myocardial infarctions and she's had 2 brothers with myocardial infarctions.     Echo on 2020 shows ejection fraction 66% with grade 1 diastolic dysfunction, aortic valve calcification without stenosis, mild tricuspid insufficiency.  Vascular screening, carotids only, done 2021 showed mild right carotid artery stenosis and plaque of the left carotid artery without stenosis.     She developed a blood clot in her left lower extremity 3/2023, treated with Eliquis.  Carotid duplex exam 2023 showed no evidence of carotid stenosis.  Echocardiogram done 2023 shows ejection action 60% with normal diastolic function, mild aortic valve calcification, mild mitral insufficiency, mild tricuspid insufficiency, normal RVSP.  She had a normal left lower extremity venous duplex study in 2023.     Labs in 6/3/2024 show normal BMP.  Labs in 2024 show sodium 135, creatinine 1.24, otherwise normal CMP, normal hemoglobin A1c, normal TSH, total cholesterol 182, HDL 86,  LDL 80, triglycerides 91, VLDL 16.   CT angiogram of the head done 3/26/2024 showed no significant stenosis, no aneurysm.  CT head showed no acute abnormalities, no hydrocephalus mass effect or herniation.  CT venogram head done 3/26/2024 showed no venous sinus thrombosis.    She walks for exercise.  She still drives.  She does not feel her heart racing or skipping.  She has had no dizziness, syncope or falls.  She has no orthopnea or PND.  She has no chest pain or pressure.  She has no exertional dyspnea.  She takes her medications as directed and generally feels well.    Past Medical History:   Diagnosis Date    Arrhythmia     Arthritis     DVT (deep venous thrombosis)     GERD (gastroesophageal reflux disease)     History of pneumonia     Hyperlipidemia     Hypertension     Hypothyroidism     Skin cancer     Subarachnoid hemorrhage     Vasodepressor syncope          Past Surgical History:   Procedure Laterality Date    APPENDECTOMY N/A     BLEPHAROPLASTY Bilateral     BREAST CYST EXCISION      to remove cysts    BREAST IMPLANT SURGERY Bilateral 05/20/2002    Dr. Mraissa Cobos,  [place]    BUNIONECTOMY Bilateral     CATARACT EXTRACTION Bilateral     COLONOSCOPY N/A 9/21/2018    Procedure: COLONOSCOPY into cecum and T.I. with cold biopsy polypectomy;  Surgeon: Roly Be MD;  Location: [facility] ENDOSCOPY;  Service: Gastroenterology    COLONOSCOPY N/A 2/7/2024    Procedure: COLONOSCOPY to cecum and ti;  Surgeon: Roly Be MD;  Location: [facility] ENDOSCOPY;  Service: Gastroenterology;  Laterality: N/A;  pre- abnormal imaging   post- diverticulosis    CORRECTION HAMMER TOE Left 12/09/2013    Revision correction through an altered surgical field of the left second hammertoe, correciton of the left third hammertoe, DuVries plantar condylectomy of the left second metatarsal, Plantar DuVries condylectomy of the left third metatarsal, partial excision of the left fifth metatarsal head for alleviation of  bunionette deformity-Dr. Jordan Montilla, [facility]    ENDOSCOPY N/A 9/21/2018    Procedure: ESOPHAGOGASTRODUODENOSCOPY with esophageal dilation #54 Castellanos;  Surgeon: Roly Be MD;  Location: [facility] ENDOSCOPY;  Service: Gastroenterology    ENDOSCOPY AND COLONOSCOPY N/A 12/29/2010    Hiatal hernia, chronic gastritis: biopsied, normal examined duodenum, normal examined ileum, mild diverticulosis sigmoid colon-Dr. Roly Be, [facility]    TOTAL ABDOMINAL HYSTERECTOMY Bilateral        Current Outpatient Medications on File Prior to Visit   Medication Sig Dispense Refill    acetaminophen (TYLENOL) 500 MG tablet Take 1 tablet by mouth Every 6 (Six) Hours As Needed for Mild Pain. 30 tablet 0    amitriptyline (ELAVIL) 25 MG tablet Take 1 tablet by mouth Every Night. 90 tablet 1    amLODIPine (NORVASC) 5 MG tablet Take 1 tablet by mouth Daily. (Patient taking differently: Take 1 tablet by mouth Every Night.) 30 tablet 6    aspirin 81 MG chewable tablet Chew 1 tablet Daily.      atorvastatin (LIPITOR) 20 MG tablet TAKE 1 TABLET BY MOUTH EVERY NIGHT 90 tablet 1    buPROPion XL (Wellbutrin XL) 150 MG 24 hr tablet Take 1 tablet by mouth Daily. 90 tablet 1    CRANBERRY PO Take  by mouth.      esomeprazole (nexIUM) 40 MG capsule TAKE 1 CAPSULE BY MOUTH EVERY MORNING BEFORE BREAKFAST 90 capsule 1    estradiol (ESTRACE VAGINAL) 0.1 MG/GM vaginal cream Insert 1 g into the vagina 2 (Two) Times a Week. 42.5 g 6    guaiFENesin-codeine (GUAIFENESIN AC) 100-10 MG/5ML liquid 1 tsp po  q6 hours prn 118 mL 0    levothyroxine (SYNTHROID, LEVOTHROID) 75 MCG tablet TAKE 1 TABLET BY MOUTH DAILY 90 tablet 1    lisinopril (PRINIVIL,ZESTRIL) 40 MG tablet Take 1 tablet by mouth Daily. 90 tablet 0    loratadine (CLARITIN) 10 MG tablet Take 1 tablet by mouth Daily.      methylPREDNISolone (MEDROL) 4 MG dose pack Take as directed on package instructions. 21 tablet 0    multivitamin (THERAGRAN) tablet tablet Take  by mouth Daily.       "neomycin-polymyxin-dexamethamethasone (POLYDEX) 3.5-94623-5.1 ointment ophthalmic ointment APPLY 1 APPLICATION IN LEFT EYE THREE TIMES DAILY      VITAMIN D PO Take  by mouth.      [DISCONTINUED] escitalopram (Lexapro) 10 MG tablet Take 1 tablet by mouth Daily. 30 tablet 6     No current facility-administered medications on file prior to visit.       Social History     Socioeconomic History    Marital status:     Number of children: 3   Tobacco Use    Smoking status: Former     Current packs/day: 0.00     Average packs/day: 0.3 packs/day for 2.0 years (0.5 ttl pk-yrs)     Types: Cigarettes     Start date: 10/10/1992     Quit date: 10/10/1994     Years since quittin.7     Passive exposure: Past    Smokeless tobacco: Never   Vaping Use    Vaping status: Never Used   Substance and Sexual Activity    Alcohol use: Never     Alcohol/week: 1.0 standard drink of alcohol     Types: 1 Glasses of wine per week    Drug use: No    Sexual activity: Defer             Procedures    ECG 12 Lead    Date/Time: 2024 11:55 AM  Performed by: Jannette Mcmahon MD    Authorized by: Jannette Mcmahon MD  Comparison: compared with previous ECG   Similar to previous ECG  Rhythm: sinus rhythm    Clinical impression: normal ECG              Objective:      Vitals:    24 1133   BP: 140/78   BP Location: Left arm   Patient Position: Sitting   Cuff Size: Adult   Pulse: 76   Resp: 18   SpO2: 97%   Weight: 69.4 kg (153 lb)   Height: 160 cm (63\")     Body mass index is 27.1 kg/m².    Vitals reviewed.   Constitutional:       General: Not in acute distress.     Appearance: Not diaphoretic.   Neck:      Vascular: No carotid bruit or JVD.   Pulmonary:      Effort: Pulmonary effort is normal.      Breath sounds: Normal breath sounds.   Cardiovascular:      Normal rate. Regular rhythm.      Murmurs: There is a grade 3/6 midsystolic murmur at the URSB and ULSB.      No gallop.  No rub.   Pulses:     Intact distal pulses.      " Carotid: 2+ bilaterally.     Radial: 2+ bilaterally.     Dorsalis pedis: 2+ bilaterally.     Posterior tibial: 2+ bilaterally.  Edema:     Peripheral edema absent.   Neurological:      Cranial Nerves: No cranial nerve deficit.         Lab Review:       Assessment:      Diagnosis Plan   1. Essential hypertension        2. Hyperlipidemia, unspecified hyperlipidemia type        3. PVC's (premature ventricular contractions)          PVCs.  Well-controlled with exercise.  Hypertension, goal <120/80.  Controlled with lisinopril and amlodipine.  Bilateral carotid bruits, atheromatous disease of the carotids without stenosis.  Family history cardiovascular disease.    History of left lower extremity DVT in 2023 treated with eliquis.   History of elevated hemoglobin, stopped her estradiol due to this.  Aortic valve calcification-causing murmur.       Plan:       See Kody in 1 year, no medication changes, advised continued exercise, no other testing at this time.

## 2024-07-01 RX ORDER — ATORVASTATIN CALCIUM 20 MG/1
20 TABLET, FILM COATED ORAL NIGHTLY
Qty: 90 TABLET | Refills: 1 | Status: SHIPPED | OUTPATIENT
Start: 2024-07-01

## 2024-07-09 RX ORDER — LISINOPRIL 40 MG/1
40 TABLET ORAL DAILY
Qty: 90 TABLET | Refills: 0 | Status: SHIPPED | OUTPATIENT
Start: 2024-07-09

## 2024-07-17 DIAGNOSIS — I10 ESSENTIAL HYPERTENSION: ICD-10-CM

## 2024-07-17 DIAGNOSIS — E03.9 HYPOTHYROIDISM, UNSPECIFIED TYPE: ICD-10-CM

## 2024-07-17 RX ORDER — AMLODIPINE BESYLATE 5 MG/1
5 TABLET ORAL DAILY
Qty: 90 TABLET | Refills: 1 | Status: SHIPPED | OUTPATIENT
Start: 2024-07-17

## 2024-07-17 RX ORDER — LEVOTHYROXINE SODIUM 0.07 MG/1
75 TABLET ORAL DAILY
Qty: 90 TABLET | Refills: 1 | OUTPATIENT
Start: 2024-07-17

## 2024-08-28 ENCOUNTER — LAB (OUTPATIENT)
Dept: OTHER | Facility: HOSPITAL | Age: 89
End: 2024-08-28
Payer: MEDICARE

## 2024-08-28 ENCOUNTER — OFFICE VISIT (OUTPATIENT)
Dept: ONCOLOGY | Facility: CLINIC | Age: 89
End: 2024-08-28
Payer: MEDICARE

## 2024-08-28 VITALS
WEIGHT: 154.4 LBS | RESPIRATION RATE: 14 BRPM | BODY MASS INDEX: 27.36 KG/M2 | SYSTOLIC BLOOD PRESSURE: 114 MMHG | HEART RATE: 79 BPM | OXYGEN SATURATION: 98 % | DIASTOLIC BLOOD PRESSURE: 72 MMHG | HEIGHT: 63 IN | TEMPERATURE: 98.2 F

## 2024-08-28 DIAGNOSIS — Z86.718 HISTORY OF DEEP VEIN THROMBOSIS: ICD-10-CM

## 2024-08-28 DIAGNOSIS — D75.1 POLYCYTHEMIA: ICD-10-CM

## 2024-08-28 DIAGNOSIS — D75.1 POLYCYTHEMIA: Primary | ICD-10-CM

## 2024-08-28 DIAGNOSIS — D72.820 LYMPHOCYTOSIS: ICD-10-CM

## 2024-08-28 LAB
ALBUMIN SERPL-MCNC: 4.1 G/DL (ref 3.5–5.2)
ALBUMIN/GLOB SERPL: 1.6 G/DL
ALP SERPL-CCNC: 91 U/L (ref 39–117)
ALT SERPL W P-5'-P-CCNC: 14 U/L (ref 1–33)
ANION GAP SERPL CALCULATED.3IONS-SCNC: 8.3 MMOL/L (ref 5–15)
AST SERPL-CCNC: 22 U/L (ref 1–32)
BASOPHILS # BLD AUTO: 0.09 10*3/MM3 (ref 0–0.2)
BASOPHILS NFR BLD AUTO: 0.7 % (ref 0–1.5)
BILIRUB SERPL-MCNC: 0.3 MG/DL (ref 0–1.2)
BUN SERPL-MCNC: 16 MG/DL (ref 8–23)
BUN/CREAT SERPL: 18.2 (ref 7–25)
CALCIUM SPEC-SCNC: 9.7 MG/DL (ref 8.6–10.5)
CHLORIDE SERPL-SCNC: 100 MMOL/L (ref 98–107)
CO2 SERPL-SCNC: 28.7 MMOL/L (ref 22–29)
CREAT SERPL-MCNC: 0.88 MG/DL (ref 0.57–1)
DEPRECATED RDW RBC AUTO: 44.5 FL (ref 37–54)
EGFRCR SERPLBLD CKD-EPI 2021: 64.1 ML/MIN/1.73
EOSINOPHIL # BLD AUTO: 0.27 10*3/MM3 (ref 0–0.4)
EOSINOPHIL NFR BLD AUTO: 2.2 % (ref 0.3–6.2)
ERYTHROCYTE [DISTWIDTH] IN BLOOD BY AUTOMATED COUNT: 13.2 % (ref 12.3–15.4)
GLOBULIN UR ELPH-MCNC: 2.5 GM/DL
GLUCOSE SERPL-MCNC: 95 MG/DL (ref 65–99)
HCT VFR BLD AUTO: 46 % (ref 34–46.6)
HGB BLD-MCNC: 14.9 G/DL (ref 12–15.9)
IMM GRANULOCYTES # BLD AUTO: 0.05 10*3/MM3 (ref 0–0.05)
IMM GRANULOCYTES NFR BLD AUTO: 0.4 % (ref 0–0.5)
LDH SERPL-CCNC: 208 U/L (ref 135–214)
LYMPHOCYTES # BLD AUTO: 3.13 10*3/MM3 (ref 0.7–3.1)
LYMPHOCYTES NFR BLD AUTO: 25.7 % (ref 19.6–45.3)
MCH RBC QN AUTO: 30 PG (ref 26.6–33)
MCHC RBC AUTO-ENTMCNC: 32.4 G/DL (ref 31.5–35.7)
MCV RBC AUTO: 92.6 FL (ref 79–97)
MONOCYTES # BLD AUTO: 0.92 10*3/MM3 (ref 0.1–0.9)
MONOCYTES NFR BLD AUTO: 7.6 % (ref 5–12)
NEUTROPHILS NFR BLD AUTO: 63.4 % (ref 42.7–76)
NEUTROPHILS NFR BLD AUTO: 7.72 10*3/MM3 (ref 1.7–7)
NRBC BLD AUTO-RTO: 0 /100 WBC (ref 0–0.2)
PLATELET # BLD AUTO: 339 10*3/MM3 (ref 140–450)
PMV BLD AUTO: 10.8 FL (ref 6–12)
POTASSIUM SERPL-SCNC: 4.9 MMOL/L (ref 3.5–5.2)
PROT SERPL-MCNC: 6.6 G/DL (ref 6–8.5)
RBC # BLD AUTO: 4.97 10*6/MM3 (ref 3.77–5.28)
SODIUM SERPL-SCNC: 137 MMOL/L (ref 136–145)
URATE SERPL-MCNC: 5.5 MG/DL (ref 2.4–5.7)
WBC NRBC COR # BLD AUTO: 12.18 10*3/MM3 (ref 3.4–10.8)

## 2024-08-28 PROCEDURE — 83615 LACTATE (LD) (LDH) ENZYME: CPT | Performed by: INTERNAL MEDICINE

## 2024-08-28 PROCEDURE — 80053 COMPREHEN METABOLIC PANEL: CPT | Performed by: INTERNAL MEDICINE

## 2024-08-28 PROCEDURE — 36415 COLL VENOUS BLD VENIPUNCTURE: CPT

## 2024-08-28 PROCEDURE — 1159F MED LIST DOCD IN RCRD: CPT | Performed by: INTERNAL MEDICINE

## 2024-08-28 PROCEDURE — 1160F RVW MEDS BY RX/DR IN RCRD: CPT | Performed by: INTERNAL MEDICINE

## 2024-08-28 PROCEDURE — 99213 OFFICE O/P EST LOW 20 MIN: CPT | Performed by: INTERNAL MEDICINE

## 2024-08-28 PROCEDURE — 1126F AMNT PAIN NOTED NONE PRSNT: CPT | Performed by: INTERNAL MEDICINE

## 2024-08-28 PROCEDURE — 85025 COMPLETE CBC W/AUTO DIFF WBC: CPT | Performed by: INTERNAL MEDICINE

## 2024-08-28 PROCEDURE — 84550 ASSAY OF BLOOD/URIC ACID: CPT | Performed by: INTERNAL MEDICINE

## 2024-08-28 NOTE — PROGRESS NOTES
Subjective     CHIEF COMPLAINT:      Chief Complaint   Patient presents with    Follow-up     HISTORY OF PRESENT ILLNESS:     Isabel Handy is a 88 y.o. female patient who returns today for follow up on her lymphocytosis and polycythemia.  She returns today for follow-up reporting that she developed some discomfort in the right tonsil area about 10 days ago.  She did a home COVID test which was positive.  She went to urgent care and COVID test was positive.  She was placed on Paxlovid.  However, she did not tolerate it and she felt worse while she was taking at.  She stopped taking it after taking a few days of this medication.  She reports that she is back to her normal.  No fever or chills.  No shortness of breath.  She is not noticing lymph node enlargement.    ROS:  Pertinent ROS is in the HPI.     Past medical, surgical, social and family history were reviewed.     MEDICATIONS:    Current Outpatient Medications:     acetaminophen (TYLENOL) 500 MG tablet, Take 1 tablet by mouth Every 6 (Six) Hours As Needed for Mild Pain., Disp: 30 tablet, Rfl: 0    amitriptyline (ELAVIL) 25 MG tablet, Take 1 tablet by mouth Every Night., Disp: 90 tablet, Rfl: 1    aspirin 81 MG chewable tablet, Chew 1 tablet Daily., Disp: , Rfl:     atorvastatin (LIPITOR) 20 MG tablet, TAKE 1 TABLET BY MOUTH EVERY NIGHT, Disp: 90 tablet, Rfl: 1    CRANBERRY PO, Take  by mouth., Disp: , Rfl:     esomeprazole (nexIUM) 40 MG capsule, TAKE 1 CAPSULE BY MOUTH EVERY MORNING BEFORE BREAKFAST, Disp: 90 capsule, Rfl: 1    levothyroxine (SYNTHROID, LEVOTHROID) 75 MCG tablet, TAKE 1 TABLET BY MOUTH DAILY, Disp: 90 tablet, Rfl: 1    lisinopril (PRINIVIL,ZESTRIL) 40 MG tablet, Take 1 tablet by mouth Daily., Disp: 90 tablet, Rfl: 0    multivitamin (THERAGRAN) tablet tablet, Take  by mouth Daily., Disp: , Rfl:     VITAMIN D PO, Take  by mouth., Disp: , Rfl:     amLODIPine (NORVASC) 5 MG tablet, TAKE 1 TABLET BY MOUTH DAILY (Patient not taking: Reported on  "8/28/2024), Disp: 90 tablet, Rfl: 1    buPROPion XL (Wellbutrin XL) 150 MG 24 hr tablet, Take 1 tablet by mouth Daily. (Patient not taking: Reported on 8/28/2024), Disp: 90 tablet, Rfl: 1    estradiol (ESTRACE VAGINAL) 0.1 MG/GM vaginal cream, Insert 1 g into the vagina 2 (Two) Times a Week. (Patient not taking: Reported on 8/28/2024), Disp: 42.5 g, Rfl: 6    guaiFENesin-codeine (GUAIFENESIN AC) 100-10 MG/5ML liquid, 1 tsp po  q6 hours prn (Patient not taking: Reported on 8/28/2024), Disp: 118 mL, Rfl: 0    loratadine (CLARITIN) 10 MG tablet, Take 1 tablet by mouth Daily. (Patient not taking: Reported on 8/28/2024), Disp: , Rfl:     neomycin-polymyxin-dexamethamethasone (POLYDEX) 3.5-74831-3.1 ointment ophthalmic ointment, APPLY 1 APPLICATION IN LEFT EYE THREE TIMES DAILY (Patient not taking: Reported on 8/28/2024), Disp: , Rfl:     Objective     VITAL SIGNS:     Vitals:    08/28/24 1429   BP: 114/72   Pulse: 79   Resp: 14   Temp: 98.2 °F (36.8 °C)   SpO2: 98%   Weight: 70 kg (154 lb 6.4 oz)   Height: 160 cm (62.99\")   PainSc: 0-No pain     Body mass index is 27.36 kg/m².     Wt Readings from Last 5 Encounters:   08/28/24 70 kg (154 lb 6.4 oz)   08/19/24 72.6 kg (160 lb)   06/28/24 69.4 kg (153 lb)   06/03/24 69.9 kg (154 lb)   05/29/24 71.2 kg (156 lb 15.5 oz)     PHYSICAL EXAMINATION:   GENERAL: The patient appears in good general condition, not in acute distress.   SKIN: No ecchymosis.  EYES: No jaundice. No Pallor.  LYMPHATIC: No cervical supraclavicular axillary lymphadenopathy.  CHEST: Normal respiratory effort.   CVS: No edema.  ABDOMEN: Soft. No tenderness. No Hepatomegaly. No Splenomegaly. No masses.  EXTREMITIES: No joint deformity.     DIAGNOSTIC DATA:     Results from last 7 days   Lab Units 08/28/24  1426   WBC 10*3/mm3 12.18*   NEUTROS ABS 10*3/mm3 7.72*   HEMOGLOBIN g/dL 14.9   HEMATOCRIT % 46.0   PLATELETS 10*3/mm3 339     Results from last 7 days   Lab Units 08/28/24  1426   SODIUM mmol/L 137 "   POTASSIUM mmol/L 4.9   CHLORIDE mmol/L 100   CO2 mmol/L 28.7   BUN mg/dL 16   CREATININE mg/dL 0.88   CALCIUM mg/dL 9.7   ALBUMIN g/dL 4.1   BILIRUBIN mg/dL 0.3   ALK PHOS U/L 91   ALT (SGPT) U/L 14   AST (SGOT) U/L 22   GLUCOSE mg/dL 95     Component      Latest Ref Rng 8/28/2024   LDH      135 - 214 U/L 208    Uric Acid      2.4 - 5.7 mg/dL 5.5      Assessment & Plan    *Polycythemia.    It was identified in the labs dating back to 2015.    Patient reports being told that she needed to donate blood but she never did so.    Hemoglobin was 16.3 with a hematocrit of 49.3% on 11/24/2021.    Patient does not have underlying lung disease or sleep apnea.  She does not smoke.    Testing for JAK2 V617F mutation, JAK2 exam 12-15 mutation, CALReticulin and MPL was negative.  Patient was suspected of having an element of hemoconcentration.  We advised the patient to increase fluid intake and cut down on intake of iron rich food.  Hemoglobin decreased to 15.1 and hematocrit decreased to 47% today.  hemoglobin increased to 16.3 on 1/26/2022.  Hematocrit was 49.3%.   We advised the patient to maintain adequate hydration and reduce intake of iron rich food.  8/10/2022: Hemoglobin improved to 15.0 and hematocrit improved to 47.8%.  3/31/2023: Hemoglobin 15.4 and hematocrit of 50.2%.  Patient reported that she does not drink enough fluids regularly.  Patient had a new DVT in the left leg that was diagnosed in early March 2023.  I explained that the DVT may have been in part secondary to the polycythemia and this may be an indicator of an underlying polycythemia vera.  3/31/2023: Labs revealed no evidence of JAK2, MPL or PEGGY reticulin mutation.  Patient increased her fluid intake.  She switched to a multivitamin with no iron.  6/14/2023: Hemoglobin 15.5.  Hematocrit 48.3%.  12/13/2023: Hemoglobin 15.2.  Hematocrit 47.7%.  Her levels improved after she increased hydration and reducing intake of iron rich food.    8/28/2024:  Hemoglobin 14.9. Hematocrit 46%.  Hemoglobin and hematocrit levels improved with hydration.  An underlying condition like polycythemia vera is considered less likely.    *Lymphocytosis.    Lymphocyte count increased to 3600 on 5/17/2018 and has been on the high side since that time.    Lymphocyte count was 3850 on 11/24/2021.    Peripheral blood flow cytometry on 11/24/2021 revealed kappa restricted B cells.  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    This may also be attributed to underlying reactive process.   Total WBC increased to 12,940 on 8/10/2022 with increase in the neutrophil count to 8,150. This is attributed to a recent UTI.   3/31/2023: Lymphocyte count was 4210.  6/14/2023: Lymphocyte count improved to 3330.  12/13/2023: Lymphocyte count improved to 3260.  3/27/2024: Lymphocyte count improved to 2680-normal.  8/28/2024: Lymphocyte count 3130-the increase since March is likely secondary to the recent COVID infection.  She has no lymphadenopathy or splenomegaly.    I reassured the patient that the improvement in the lymphocyte count since 2023 indicates that there is no underlying lymphoproliferative disorder and that the increase in the lymphocyte count is a benign process.      *History of left calf DVT.  Patient developed pain in the left leg on 3/4/2023 and had difficulty putting weight on it.    Venous Doppler on 3/5/2023 revealed no flow and no compressibility within the left anterior tibial vein.  The DVT developed after she had a fall and injury to the left leg resulting in decreased mobility.  Her leg was in a boot.  The DVT is therefore considered provoked episode.  The leg pain decreased after she started anticoagulation.   She has prominent superficial veins in the left lower extremity but with no signs of thrombosis.  Since the DVT was a calf vein DVT, I recommended 3 months of anticoagulation.  Venous Doppler in 6/5/2023 showed resolution  of the DVT.    After 3 months of anticoagulation, she was switched to aspirin 81 mg daily.  I recommended continuing aspirin to decrease risk for recurrent thrombosis.    PLAN:    1.  Continue aspirin 81 mg daily.  2.  Continue to maintain adequate hydration.  3.  Since there is no evidence of an underlying lymphoproliferative disorder, I did not schedule her for a follow-up visit.  She has close follow-up with her PCP.  We will be available to see her in the future if there are any questions or concerns.         Devan Salmeron MD  08/28/24

## 2024-10-07 DIAGNOSIS — E03.9 HYPOTHYROIDISM, UNSPECIFIED TYPE: ICD-10-CM

## 2024-10-07 RX ORDER — LEVOTHYROXINE SODIUM 75 UG/1
75 TABLET ORAL DAILY
Qty: 90 TABLET | Refills: 1 | Status: SHIPPED | OUTPATIENT
Start: 2024-10-07

## 2024-10-21 RX ORDER — ESOMEPRAZOLE MAGNESIUM 40 MG/1
40 CAPSULE, DELAYED RELEASE ORAL
Qty: 90 CAPSULE | Refills: 1 | Status: SHIPPED | OUTPATIENT
Start: 2024-10-21

## 2024-10-30 ENCOUNTER — LAB (OUTPATIENT)
Dept: INTERNAL MEDICINE | Facility: CLINIC | Age: 89
End: 2024-10-30
Payer: MEDICARE

## 2024-10-30 DIAGNOSIS — E55.9 VITAMIN D DEFICIENCY: ICD-10-CM

## 2024-10-30 DIAGNOSIS — I10 ESSENTIAL HYPERTENSION: ICD-10-CM

## 2024-10-30 DIAGNOSIS — E03.9 HYPOTHYROIDISM, UNSPECIFIED TYPE: Primary | ICD-10-CM

## 2024-10-30 DIAGNOSIS — R73.03 PREDIABETES: ICD-10-CM

## 2024-10-31 LAB
25(OH)D3+25(OH)D2 SERPL-MCNC: 60.3 NG/ML (ref 30–100)
ALBUMIN SERPL-MCNC: 4.4 G/DL (ref 3.5–5.2)
ALBUMIN/GLOB SERPL: 1.8 G/DL
ALP SERPL-CCNC: 99 U/L (ref 39–117)
ALT SERPL-CCNC: 17 U/L (ref 1–33)
AST SERPL-CCNC: 21 U/L (ref 1–32)
BASOPHILS # BLD AUTO: 0.08 10*3/MM3 (ref 0–0.2)
BASOPHILS NFR BLD AUTO: 0.5 % (ref 0–1.5)
BILIRUB SERPL-MCNC: 0.9 MG/DL (ref 0–1.2)
BUN SERPL-MCNC: 9 MG/DL (ref 8–23)
BUN/CREAT SERPL: 10.7 (ref 7–25)
CALCIUM SERPL-MCNC: 10 MG/DL (ref 8.6–10.5)
CHLORIDE SERPL-SCNC: 100 MMOL/L (ref 98–107)
CHOLEST SERPL-MCNC: 187 MG/DL (ref 0–200)
CHOLEST/HDLC SERPL: 1.95 {RATIO}
CO2 SERPL-SCNC: 26.2 MMOL/L (ref 22–29)
CREAT SERPL-MCNC: 0.84 MG/DL (ref 0.57–1)
EGFRCR SERPLBLD CKD-EPI 2021: 67.8 ML/MIN/1.73
EOSINOPHIL # BLD AUTO: 0.16 10*3/MM3 (ref 0–0.4)
EOSINOPHIL NFR BLD AUTO: 0.9 % (ref 0.3–6.2)
ERYTHROCYTE [DISTWIDTH] IN BLOOD BY AUTOMATED COUNT: 12.2 % (ref 12.3–15.4)
GLOBULIN SER CALC-MCNC: 2.4 GM/DL
GLUCOSE SERPL-MCNC: 95 MG/DL (ref 65–99)
HBA1C MFR BLD: 5.5 % (ref 4.8–5.6)
HCT VFR BLD AUTO: 48.3 % (ref 34–46.6)
HDLC SERPL-MCNC: 96 MG/DL (ref 40–60)
HGB BLD-MCNC: 15.7 G/DL (ref 12–15.9)
IMM GRANULOCYTES # BLD AUTO: 0.09 10*3/MM3 (ref 0–0.05)
IMM GRANULOCYTES NFR BLD AUTO: 0.5 % (ref 0–0.5)
LDLC SERPL CALC-MCNC: 78 MG/DL (ref 0–100)
LYMPHOCYTES # BLD AUTO: 3.22 10*3/MM3 (ref 0.7–3.1)
LYMPHOCYTES NFR BLD AUTO: 19 % (ref 19.6–45.3)
MCH RBC QN AUTO: 29.6 PG (ref 26.6–33)
MCHC RBC AUTO-ENTMCNC: 32.5 G/DL (ref 31.5–35.7)
MCV RBC AUTO: 91 FL (ref 79–97)
MONOCYTES # BLD AUTO: 0.89 10*3/MM3 (ref 0.1–0.9)
MONOCYTES NFR BLD AUTO: 5.3 % (ref 5–12)
NEUTROPHILS # BLD AUTO: 12.51 10*3/MM3 (ref 1.7–7)
NEUTROPHILS NFR BLD AUTO: 73.8 % (ref 42.7–76)
NRBC BLD AUTO-RTO: 0 /100 WBC (ref 0–0.2)
PLATELET # BLD AUTO: 322 10*3/MM3 (ref 140–450)
POTASSIUM SERPL-SCNC: 4.3 MMOL/L (ref 3.5–5.2)
PROT SERPL-MCNC: 6.8 G/DL (ref 6–8.5)
RBC # BLD AUTO: 5.31 10*6/MM3 (ref 3.77–5.28)
SODIUM SERPL-SCNC: 138 MMOL/L (ref 136–145)
TRIGL SERPL-MCNC: 69 MG/DL (ref 0–150)
TSH SERPL DL<=0.005 MIU/L-ACNC: 0.94 UIU/ML (ref 0.27–4.2)
VLDLC SERPL CALC-MCNC: 13 MG/DL (ref 5–40)
WBC # BLD AUTO: 16.95 10*3/MM3 (ref 3.4–10.8)

## 2024-10-31 NOTE — PROGRESS NOTES
Follow up for results as scheduled.   [place] [facility] [place]

## 2024-11-06 ENCOUNTER — OFFICE VISIT (OUTPATIENT)
Dept: INTERNAL MEDICINE | Facility: CLINIC | Age: 89
End: 2024-11-06
Payer: MEDICARE

## 2024-11-06 VITALS
HEIGHT: 63 IN | DIASTOLIC BLOOD PRESSURE: 88 MMHG | HEART RATE: 76 BPM | SYSTOLIC BLOOD PRESSURE: 138 MMHG | BODY MASS INDEX: 27.75 KG/M2 | WEIGHT: 156.6 LBS | TEMPERATURE: 97.7 F | OXYGEN SATURATION: 98 %

## 2024-11-06 DIAGNOSIS — I10 ESSENTIAL HYPERTENSION: ICD-10-CM

## 2024-11-06 DIAGNOSIS — Z00.00 MEDICARE ANNUAL WELLNESS VISIT, SUBSEQUENT: Primary | ICD-10-CM

## 2024-11-06 DIAGNOSIS — D72.829 LEUKOCYTOSIS, UNSPECIFIED TYPE: ICD-10-CM

## 2024-11-06 PROCEDURE — G0439 PPPS, SUBSEQ VISIT: HCPCS | Performed by: NURSE PRACTITIONER

## 2024-11-06 PROCEDURE — 1170F FXNL STATUS ASSESSED: CPT | Performed by: NURSE PRACTITIONER

## 2024-11-06 PROCEDURE — 1126F AMNT PAIN NOTED NONE PRSNT: CPT | Performed by: NURSE PRACTITIONER

## 2024-11-06 PROCEDURE — 1159F MED LIST DOCD IN RCRD: CPT | Performed by: NURSE PRACTITIONER

## 2024-11-06 PROCEDURE — 1160F RVW MEDS BY RX/DR IN RCRD: CPT | Performed by: NURSE PRACTITIONER

## 2024-11-06 RX ORDER — ESTRADIOL 10 UG/1
1 INSERT VAGINAL 2 TIMES WEEKLY
Qty: 8 TABLET | Refills: 5 | Status: SHIPPED | OUTPATIENT
Start: 2024-11-07

## 2024-11-06 NOTE — PROGRESS NOTES
Subjective   The ABCs of the Annual Wellness Visit  Medicare Wellness Visit      Isabel Handy is a 88 y.o. patient who presents for a Medicare Wellness Visit.    The following portions of the patient's history were reviewed and   updated as appropriate: allergies, current medications, past family history, past medical history, past social history, past surgical history, and problem list.    Compared to one year ago, the patient's physical   health is the same.  Compared to one year ago, the patient's mental   health is the same.    Recent Hospitalizations:  She was not admitted to the hospital during the last year.     Current Medical Providers:  Patient Care Team:  Beth Figueredo APRN as PCP - General (Family Medicine)  Beth Figueredo APRN as PCP - Internal Medicine (Internal Medicine)  Beth Figueredo APRN as Referring Physician (Family Medicine)  Devan Salmeron MD as Consulting Physician (Hematology and Oncology)    Outpatient Medications Prior to Visit   Medication Sig Dispense Refill    acetaminophen (TYLENOL) 500 MG tablet Take 1 tablet by mouth Every 6 (Six) Hours As Needed for Mild Pain. 30 tablet 0    aspirin 81 MG chewable tablet Chew 1 tablet Daily.      atorvastatin (LIPITOR) 20 MG tablet TAKE 1 TABLET BY MOUTH EVERY NIGHT 90 tablet 1    CRANBERRY PO Take  by mouth.      esomeprazole (nexIUM) 40 MG capsule TAKE 1 CAPSULE BY MOUTH EVERY MORNING BEFORE BREAKFAST 90 capsule 1    levothyroxine (SYNTHROID, LEVOTHROID) 75 MCG tablet TAKE 1 TABLET BY MOUTH DAILY 90 tablet 1    lisinopril (PRINIVIL,ZESTRIL) 40 MG tablet Take 1 tablet by mouth Daily. 90 tablet 0    multivitamin (THERAGRAN) tablet tablet Take  by mouth Daily.      VITAMIN D PO Take  by mouth.      amitriptyline (ELAVIL) 25 MG tablet Take 1 tablet by mouth Every Night. (Patient not taking: Reported on 11/6/2024) 90 tablet 1    nitrofurantoin, macrocrystal-monohydrate, (MACROBID) 100 MG capsule Take 1 capsule by mouth 2 (Two) Times a Day  "for 7 days. (Patient not taking: Reported on 11/6/2024) 14 capsule 0     No facility-administered medications prior to visit.     No opioid medication identified on active medication list. I have reviewed chart for other potential  high risk medication/s and harmful drug interactions in the elderly.      Aspirin is on active medication list. Aspirin use is indicated based on review of current medical condition/s. Pros and cons of this therapy have been discussed today. Benefits of this medication outweigh potential harm.  Patient has been encouraged to continue taking this medication.  .      Patient Active Problem List   Diagnosis    Gastroesophageal reflux disease    Menopausal syndrome    Osteoarthritis of cervical spine    Carotid atherosclerosis    Prediabetes    Low back pain    Insomnia    Hypothyroidism    Essential hypertension    Hyperlipidemia    Allergic rhinitis    Palpitations    Anxiety    PVC's (premature ventricular contractions)    Vitamin D deficiency    Lung nodule    Hyperglycemia    Occipital pain    Malaise and fatigue    Acute nonintractable headache    Elevated hematocrit    Posterior subcapsular age-related cataract of both eyes    Dry eye syndrome of bilateral lacrimal glands    Hearing loss    Nausea    Elevated hemoglobin    Hard of hearing    Acute deep vein thrombosis (DVT) of calf muscle vein of left lower extremity    History of CVA (cerebrovascular accident)     Advance Care Planning Advance Directive is not on file.  ACP discussion was held with the patient during this visit. Patient does not have an advance directive, declines further assistance.            Objective   Vitals:    11/06/24 1452   BP: 138/88   Pulse: 76   Temp: 97.7 °F (36.5 °C)   SpO2: 98%   Weight: 71 kg (156 lb 9.6 oz)   Height: 160 cm (62.99\")   PainSc: 0-No pain       Estimated body mass index is 27.75 kg/m² as calculated from the following:    Height as of this encounter: 160 cm (62.99\").    Weight as of this " encounter: 71 kg (156 lb 9.6 oz).            Does the patient have evidence of cognitive impairment? No  Lab Results   Component Value Date    CHLPL 187 10/30/2024    TRIG 69 10/30/2024    HDL 96 (H) 10/30/2024    LDL 78 10/30/2024    VLDL 13 10/30/2024    HGBA1C 5.50 10/30/2024                                                                                                Health  Risk Assessment    Smoking Status:  Social History     Tobacco Use   Smoking Status Former    Current packs/day: 0.00    Average packs/day: 0.3 packs/day for 2.0 years (0.5 ttl pk-yrs)    Types: Cigarettes    Start date: 10/10/1992    Quit date: 10/10/1994    Years since quittin.0    Passive exposure: Past   Smokeless Tobacco Never     Alcohol Consumption:  Social History     Substance and Sexual Activity   Alcohol Use Never    Alcohol/week: 1.0 standard drink of alcohol    Types: 1 Glasses of wine per week       Fall Risk Screen  STEADI Fall Risk Assessment was completed, and patient is at LOW risk for falls.Assessment completed on:2024    Depression Screenin/6/2024     2:56 PM   PHQ-2/PHQ-9 Depression Screening   Little interest or pleasure in doing things Not at all   Feeling down, depressed, or hopeless Not at all   How difficult have these problems made it for you to do your work, take care of things at home, or get along with other people? Not difficult at all     Health Habits and Functional and Cognitive Screenin/6/2024     2:57 PM   Functional & Cognitive Status   Do you have difficulty preparing food and eating? No   Do you have difficulty bathing yourself, getting dressed or grooming yourself? No   Do you have difficulty using the toilet? No   Do you have difficulty moving around from place to place? No   Do you have trouble with steps or getting out of a bed or a chair? No   Current Diet Well Balanced Diet   Dental Exam Up to date   Eye Exam Up to date   Exercise (times per week) Other   Current  Exercises Include No Regular Exercise   Do you need help using the phone?  No   Are you deaf or do you have serious difficulty hearing?  Yes   Do you need help to go to places out of walking distance? No   Do you need help shopping? No   Do you need help preparing meals?  No   Do you need help with housework?  No   Do you need help with laundry? No   Do you need help taking your medications? No   Do you need help managing money? No   Do you ever drive or ride in a car without wearing a seat belt? No   Have you felt unusual stress, anger or loneliness in the last month? No   Who do you live with? Alone   If you need help, do you have trouble finding someone available to you? No   Have you been bothered in the last four weeks by sexual problems? No   Do you have difficulty concentrating, remembering or making decisions? No           Age-appropriate Screening Schedule:  Refer to the list below for future screening recommendations based on patient's age, sex and/or medical conditions. Orders for these recommended tests are listed in the plan section. The patient has been provided with a written plan.    Health Maintenance List  Health Maintenance   Topic Date Due    ZOSTER VACCINE (1 of 2) Never done    MAMMOGRAM  04/13/2022    INFLUENZA VACCINE  08/01/2024    COVID-19 Vaccine (4 - 2024-25 season) 09/01/2024    ANNUAL WELLNESS VISIT  09/26/2024    RSV Vaccine - Adults (1 - 1-dose 75+ series) 03/28/2025 (Originally 11/27/2012)    BMI FOLLOWUP  05/02/2025    LIPID PANEL  10/30/2025    DXA SCAN  10/27/2027    COLORECTAL CANCER SCREENING  02/07/2029    TDAP/TD VACCINES (2 - Td or Tdap) 10/08/2032    Pneumococcal Vaccine 65+  Completed                                                                                                                                                CMS Preventative Services Quick Reference  Risk Factors Identified During Encounter  Hearing Problem:  has hearing aids  Immunizations  "Discussed/Encouraged: Influenza, Shingrix, COVID19, and RSV (Respiratory Syncytial Virus)    The above risks/problems have been discussed with the patient.  Pertinent information has been shared with the patient in the After Visit Summary.  An After Visit Summary and PPPS were made available to the patient.    Follow Up:   Next Medicare Wellness visit to be scheduled in 1 year.         Additional E&M Note during same encounter follows:  Patient has additional, significant, and separately identifiable condition(s)/problem(s) that require work above and beyond the Medicare Wellness Visit     Chief Complaint  Medicare Wellness-subsequent and Blood in Urine    Subjective   HPI    HTN- checking at home and running normally, does run high in the office.   Pt is doing well with current medication regimen, denies adverse reactions, compliant with medication schedule.     Recent [facility] [place] [facility] 10/30-Urine cx e-coli- placed on z[facility] Hall is also being seen today for additional medical problem/s.    Review of Systems   Constitutional:  Positive for fatigue (from working yesterday). Negative for chills and fever.   Respiratory: Negative.     Cardiovascular: Negative.    Psychiatric/Behavioral: Negative.                Objective   Vital Signs:  /88   Pulse 76   Temp 97.7 °F (36.5 °C)   Ht 160 cm (62.99\")   Wt 71 kg (156 lb 9.6 oz)   SpO2 98%   BMI 27.75 kg/m²   Physical Exam  Constitutional:       Appearance: Normal appearance. She is well-developed.   Neck:      Thyroid: No thyromegaly.   Cardiovascular:      Rate and Rhythm: Normal rate and regular rhythm.      Heart sounds: Normal heart sounds.      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      Breath sounds: Normal breath sounds.   Musculoskeletal:      Cervical back: Normal range of motion and neck supple.      Right lower leg: Edema present.      Left lower leg: Edema present. "   Lymphadenopathy:      Cervical: No cervical adenopathy.   Skin:     General: Skin is warm and dry.   Neurological:      Mental Status: She is alert.   Psychiatric:         Behavior: Behavior normal.         Thought Content: Thought content normal.         Judgment: Judgment normal.         The following data was reviewed by: ED Morris on 11/06/2024:        Assessment and Plan Additional age appropriate preventative wellness advice topics were discussed during today's preventative wellness exam(some topics already addressed during AWV portion of the note above):    Physical Activity: Advised cardiovascular activity 150 minutes per week as tolerated. (example brisk walk for 30 minutes, 5 days a week).              Leukocytosis, unspecified type    Essential hypertension  Hypertension is stable and controlled  Continue current treatment regimen.  Blood pressure will be reassessed in 6 months.  Medicare annual wellness visit, subsequent    No orders of the defined types were placed in this encounter.    Leukocytosis- had UTI when labs were drawn, will try to send in vaginal estrogen   2. HTN- running well at home  3. Recent UTI- recheck UA in 13 days.   4. Hypothyroidism- continue same   New Medications Ordered This Visit   Medications    estradiol (VAGIFEM) 10 MCG tablet vaginal tablet     Sig: Insert 1 tablet into the vagina 2 (Two) Times a Week.     Dispense:  8 tablet     Refill:  5        I spent 20 minutes caring for Isabel on this date of service. This time includes time spent by me in the following activities:preparing for the visit, reviewing tests, performing a medically appropriate examination and/or evaluation , counseling and educating the patient/family/caregiver, documenting information in the medical record, and independently interpreting results and communicating that information with the patient/family/caregiver  Follow Up   No follow-ups on file.  Patient was given instructions and  counseling regarding her condition or for health maintenance advice. Please see specific information pulled into the AVS if appropriate.

## 2024-11-11 RX ORDER — LISINOPRIL 40 MG/1
40 TABLET ORAL DAILY
Qty: 90 TABLET | Refills: 0 | Status: SHIPPED | OUTPATIENT
Start: 2024-11-11

## 2025-01-13 RX ORDER — ATORVASTATIN CALCIUM 20 MG/1
20 TABLET, FILM COATED ORAL NIGHTLY
Qty: 90 TABLET | Refills: 1 | Status: SHIPPED | OUTPATIENT
Start: 2025-01-13

## 2025-02-18 RX ORDER — LISINOPRIL 40 MG/1
40 TABLET ORAL DAILY
Qty: 90 TABLET | Refills: 0 | Status: SHIPPED | OUTPATIENT
Start: 2025-02-18

## 2025-04-13 DIAGNOSIS — E03.9 HYPOTHYROIDISM, UNSPECIFIED TYPE: ICD-10-CM

## 2025-04-14 RX ORDER — LEVOTHYROXINE SODIUM 75 UG/1
75 TABLET ORAL DAILY
Qty: 90 TABLET | Refills: 1 | Status: SHIPPED | OUTPATIENT
Start: 2025-04-14

## 2025-04-22 RX ORDER — OMEPRAZOLE 40 MG/1
40 CAPSULE, DELAYED RELEASE ORAL DAILY
Qty: 90 CAPSULE | Refills: 1 | OUTPATIENT
Start: 2025-04-22

## 2025-04-22 RX ORDER — ESOMEPRAZOLE MAGNESIUM 40 MG/1
40 CAPSULE, DELAYED RELEASE ORAL
Qty: 90 CAPSULE | Refills: 1 | Status: SHIPPED | OUTPATIENT
Start: 2025-04-22

## 2025-04-30 DIAGNOSIS — E55.9 VITAMIN D DEFICIENCY: ICD-10-CM

## 2025-04-30 DIAGNOSIS — R73.03 PREDIABETES: ICD-10-CM

## 2025-04-30 DIAGNOSIS — I10 ESSENTIAL HYPERTENSION: ICD-10-CM

## 2025-04-30 DIAGNOSIS — E03.9 HYPOTHYROIDISM, UNSPECIFIED TYPE: Primary | ICD-10-CM

## 2025-05-02 LAB
25(OH)D3+25(OH)D2 SERPL-MCNC: 52.4 NG/ML (ref 30–100)
ALBUMIN SERPL-MCNC: 4.3 G/DL (ref 3.5–5.2)
ALBUMIN/GLOB SERPL: 2.3 G/DL
ALP SERPL-CCNC: 96 U/L (ref 39–117)
ALT SERPL-CCNC: 14 U/L (ref 1–33)
AST SERPL-CCNC: 17 U/L (ref 1–32)
BASOPHILS # BLD AUTO: 0.08 10*3/MM3 (ref 0–0.2)
BASOPHILS NFR BLD AUTO: 0.7 % (ref 0–1.5)
BILIRUB SERPL-MCNC: 0.7 MG/DL (ref 0–1.2)
BUN SERPL-MCNC: 9 MG/DL (ref 8–23)
BUN/CREAT SERPL: 10.5 (ref 7–25)
CALCIUM SERPL-MCNC: 10 MG/DL (ref 8.6–10.5)
CHLORIDE SERPL-SCNC: 97 MMOL/L (ref 98–107)
CHOLEST SERPL-MCNC: 171 MG/DL (ref 0–200)
CHOLEST/HDLC SERPL: 1.9 {RATIO}
CO2 SERPL-SCNC: 25.8 MMOL/L (ref 22–29)
CREAT SERPL-MCNC: 0.86 MG/DL (ref 0.57–1)
EGFRCR SERPLBLD CKD-EPI 2021: 65.5 ML/MIN/1.73
EOSINOPHIL # BLD AUTO: 0.24 10*3/MM3 (ref 0–0.4)
EOSINOPHIL NFR BLD AUTO: 2.2 % (ref 0.3–6.2)
ERYTHROCYTE [DISTWIDTH] IN BLOOD BY AUTOMATED COUNT: 11.9 % (ref 12.3–15.4)
GLOBULIN SER CALC-MCNC: 1.9 GM/DL
GLUCOSE SERPL-MCNC: 101 MG/DL (ref 65–99)
HBA1C MFR BLD: 5.4 % (ref 4.8–5.6)
HCT VFR BLD AUTO: 44.6 % (ref 34–46.6)
HDLC SERPL-MCNC: 90 MG/DL (ref 40–60)
HGB BLD-MCNC: 14.9 G/DL (ref 12–15.9)
IMM GRANULOCYTES # BLD AUTO: 0.04 10*3/MM3 (ref 0–0.05)
IMM GRANULOCYTES NFR BLD AUTO: 0.4 % (ref 0–0.5)
LDLC SERPL CALC-MCNC: 67 MG/DL (ref 0–100)
LYMPHOCYTES # BLD AUTO: 3.91 10*3/MM3 (ref 0.7–3.1)
LYMPHOCYTES NFR BLD AUTO: 36.3 % (ref 19.6–45.3)
MCH RBC QN AUTO: 30.1 PG (ref 26.6–33)
MCHC RBC AUTO-ENTMCNC: 33.4 G/DL (ref 31.5–35.7)
MCV RBC AUTO: 90.1 FL (ref 79–97)
MONOCYTES # BLD AUTO: 0.83 10*3/MM3 (ref 0.1–0.9)
MONOCYTES NFR BLD AUTO: 7.7 % (ref 5–12)
NEUTROPHILS # BLD AUTO: 5.66 10*3/MM3 (ref 1.7–7)
NEUTROPHILS NFR BLD AUTO: 52.7 % (ref 42.7–76)
NRBC BLD AUTO-RTO: 0 /100 WBC (ref 0–0.2)
PLATELET # BLD AUTO: 349 10*3/MM3 (ref 140–450)
POTASSIUM SERPL-SCNC: 5.2 MMOL/L (ref 3.5–5.2)
PROT SERPL-MCNC: 6.2 G/DL (ref 6–8.5)
RBC # BLD AUTO: 4.95 10*6/MM3 (ref 3.77–5.28)
SODIUM SERPL-SCNC: 134 MMOL/L (ref 136–145)
TRIGL SERPL-MCNC: 75 MG/DL (ref 0–150)
TSH SERPL DL<=0.005 MIU/L-ACNC: 0.98 UIU/ML (ref 0.27–4.2)
VLDLC SERPL CALC-MCNC: 14 MG/DL (ref 5–40)
WBC # BLD AUTO: 10.76 10*3/MM3 (ref 3.4–10.8)

## 2025-05-08 ENCOUNTER — OFFICE VISIT (OUTPATIENT)
Dept: INTERNAL MEDICINE | Facility: CLINIC | Age: 89
End: 2025-05-08
Payer: MEDICARE

## 2025-05-08 VITALS
WEIGHT: 159.5 LBS | HEIGHT: 63 IN | SYSTOLIC BLOOD PRESSURE: 114 MMHG | BODY MASS INDEX: 28.26 KG/M2 | DIASTOLIC BLOOD PRESSURE: 62 MMHG | HEART RATE: 80 BPM | OXYGEN SATURATION: 98 %

## 2025-05-08 DIAGNOSIS — R35.0 URINARY FREQUENCY: ICD-10-CM

## 2025-05-08 DIAGNOSIS — I10 ESSENTIAL HYPERTENSION: Primary | ICD-10-CM

## 2025-05-08 DIAGNOSIS — E78.5 HYPERLIPIDEMIA, UNSPECIFIED HYPERLIPIDEMIA TYPE: ICD-10-CM

## 2025-05-08 DIAGNOSIS — E03.9 HYPOTHYROIDISM, UNSPECIFIED TYPE: ICD-10-CM

## 2025-05-08 LAB
BILIRUB BLD-MCNC: NEGATIVE MG/DL
CLARITY, POC: CLEAR
COLOR UR: YELLOW
EXPIRATION DATE: NORMAL
GLUCOSE UR STRIP-MCNC: NEGATIVE MG/DL
KETONES UR QL: NEGATIVE
LEUKOCYTE EST, POC: NEGATIVE
Lab: NORMAL
NITRITE UR-MCNC: NEGATIVE MG/ML
PH UR: 6.5 [PH] (ref 5–8)
PROT UR STRIP-MCNC: NEGATIVE MG/DL
RBC # UR STRIP: NEGATIVE /UL
SP GR UR: 1.01 (ref 1–1.03)
UROBILINOGEN UR QL: NORMAL

## 2025-05-08 NOTE — PROGRESS NOTES
Subjective   Isabel Handy is a 89 y.o. female.      History of Present Illness   The patient is here today to F/U on lab work. She is feeling well.   Having some trouble with remembering names. She is not forgetting anything else.     HTN-Pt is doing well with current medication regimen, denies adverse reactions, compliant with medication schedule.   Vit D -Pt is doing well with current medication regimen, denies adverse reactions, compliant with medication schedule.   Hypothyroidism-Pt is doing well with current medication regimen, denies adverse reactions, compliant with medication schedule.     Frequent UTIs- seeing urologist in [place], had cysto, having fewer now.     Niurka handy is her grand daughter and is pregnant.     The following portions of the patient's history were reviewed and updated as appropriate: allergies, current medications, past family history, past medical history, past social history, past surgical history and problem list.    Review of Systems   Constitutional:  Negative for chills and fever.   Respiratory: Negative.     Cardiovascular:  Positive for leg swelling (at baseline, does not want compression stockings).   Psychiatric/Behavioral:  Positive for depressed mood (occ). Negative for dysphoric mood and suicidal ideas. The patient is not nervous/anxious.        Objective   Physical Exam  Constitutional:       Appearance: Normal appearance. She is well-developed.   Neck:      Thyroid: No thyromegaly.   Cardiovascular:      Rate and Rhythm: Normal rate and regular rhythm.      Pulses:           Carotid pulses are 2+ on the right side and 2+ on the left side.     Heart sounds: Murmur heard.      Systolic murmur is present with a grade of 3/6.      Comments: Moderate   Pulmonary:      Effort: Pulmonary effort is normal.      Breath sounds: Normal breath sounds.   Musculoskeletal:      Cervical back: Normal range of motion and neck supple.      Right lower leg: Edema present.       Left lower leg: Edema present.   Lymphadenopathy:      Cervical: No cervical adenopathy.   Skin:     General: Skin is warm and dry.   Neurological:      Mental Status: She is alert.   Psychiatric:         Behavior: Behavior normal.         Thought Content: Thought content normal.         Judgment: Judgment normal.         Vitals:    05/08/25 1434   BP: 114/62   Pulse: 80   SpO2: 98%     Body mass index is 28.26 kg/m².      Current Outpatient Medications:     acetaminophen (TYLENOL) 500 MG tablet, Take 1 tablet by mouth Every 6 (Six) Hours As Needed for Mild Pain., Disp: 30 tablet, Rfl: 0    aspirin 81 MG chewable tablet, Chew 1 tablet Daily., Disp: , Rfl:     atorvastatin (LIPITOR) 20 MG tablet, TAKE 1 TABLET BY MOUTH EVERY NIGHT, Disp: 90 tablet, Rfl: 1    cetirizine (zyrTEC) 5 MG tablet, Take 1 tablet by mouth Daily., Disp: 30 tablet, Rfl: 0    CRANBERRY PO, Take  by mouth., Disp: , Rfl:     esomeprazole (nexIUM) 40 MG capsule, TAKE 1 CAPSULE BY MOUTH EVERY MORNING BEFORE BREAKFAST, Disp: 90 capsule, Rfl: 1    levothyroxine (SYNTHROID, LEVOTHROID) 75 MCG tablet, TAKE 1 TABLET BY MOUTH DAILY, Disp: 90 tablet, Rfl: 1    lisinopril (PRINIVIL,ZESTRIL) 40 MG tablet, TAKE ONE TABLET BY MOUTH EVERY DAY FOR BLOOD PRESSURE, Disp: 90 tablet, Rfl: 0    multivitamin (THERAGRAN) tablet tablet, Take  by mouth Daily., Disp: , Rfl:     VITAMIN D PO, Take  by mouth., Disp: , Rfl:    Assessment & Plan   Diagnoses and all orders for this visit:    1. Essential hypertension (Primary)    2. Urinary frequency  -     POCT urinalysis dipstick, automated    3. Hyperlipidemia, unspecified hyperlipidemia type    4. Hypothyroidism, unspecified type             Orders Only on 04/30/2025   Component Date Value Ref Range Status    WBC 05/01/2025 10.76  3.40 - 10.80 10*3/mm3 Final    RBC 05/01/2025 4.95  3.77 - 5.28 10*6/mm3 Final    Hemoglobin 05/01/2025 14.9  12.0 - 15.9 g/dL Final    Hematocrit 05/01/2025 44.6  34.0 - 46.6 % Final    MCV  05/01/2025 90.1  79.0 - 97.0 fL Final    MCH 05/01/2025 30.1  26.6 - 33.0 pg Final    MCHC 05/01/2025 33.4  31.5 - 35.7 g/dL Final    RDW 05/01/2025 11.9 (L)  12.3 - 15.4 % Final    Platelets 05/01/2025 349  140 - 450 10*3/mm3 Final    Neutrophil Rel % 05/01/2025 52.7  42.7 - 76.0 % Final    Lymphocyte Rel % 05/01/2025 36.3  19.6 - 45.3 % Final    Monocyte Rel % 05/01/2025 7.7  5.0 - 12.0 % Final    Eosinophil Rel % 05/01/2025 2.2  0.3 - 6.2 % Final    Basophil Rel % 05/01/2025 0.7  0.0 - 1.5 % Final    Neutrophils Absolute 05/01/2025 5.66  1.70 - 7.00 10*3/mm3 Final    Lymphocytes Absolute 05/01/2025 3.91 (H)  0.70 - 3.10 10*3/mm3 Final    Monocytes Absolute 05/01/2025 0.83  0.10 - 0.90 10*3/mm3 Final    Eosinophils Absolute 05/01/2025 0.24  0.00 - 0.40 10*3/mm3 Final    Basophils Absolute 05/01/2025 0.08  0.00 - 0.20 10*3/mm3 Final    Immature Granulocyte Rel % 05/01/2025 0.4  0.0 - 0.5 % Final    Immature Grans Absolute 05/01/2025 0.04  0.00 - 0.05 10*3/mm3 Final    nRBC 05/01/2025 0.0  0.0 - 0.2 /100 WBC Final    Glucose 05/01/2025 101 (H)  65 - 99 mg/dL Final    BUN 05/01/2025 9  8 - 23 mg/dL Final    Creatinine 05/01/2025 0.86  0.57 - 1.00 mg/dL Final    EGFR Result 05/01/2025 65.5  >60.0 mL/min/1.73 Final    Comment: GFR Categories in Chronic Kidney Disease (CKD)  GFR Category          GFR (mL/min/1.73)    Interpretation  G1                    90 or greater        Normal or high  (1)  G2                    60-89                Mild decrease  (1)  G3a                   45-59                Mild to moderate  decrease  G3b                   30-44                Moderate to  severe decrease  G4                    15-29                Severe decrease  G5                    14 or less           Kidney failure  (1)In the absence of evidence of kidney disease, neither  GFR category G1 or G2 fulfill the criteria for CKD.  eGFR calculation 2021 CKD-EPI creatinine equation, which  does not include race as a factor       BUN/Creatinine Ratio 05/01/2025 10.5  7.0 - 25.0 Final    Sodium 05/01/2025 134 (L)  136 - 145 mmol/L Final    Potassium 05/01/2025 5.2  3.5 - 5.2 mmol/L Final    Chloride 05/01/2025 97 (L)  98 - 107 mmol/L Final    Total CO2 05/01/2025 25.8  22.0 - 29.0 mmol/L Final    Calcium 05/01/2025 10.0  8.6 - 10.5 mg/dL Final    Total Protein 05/01/2025 6.2  6.0 - 8.5 g/dL Final    Albumin 05/01/2025 4.3  3.5 - 5.2 g/dL Final    Globulin 05/01/2025 1.9  gm/dL Final    A/G Ratio 05/01/2025 2.3  g/dL Final    Total Bilirubin 05/01/2025 0.7  0.0 - 1.2 mg/dL Final    Alkaline Phosphatase 05/01/2025 96  39 - 117 U/L Final    AST (SGOT) 05/01/2025 17  1 - 32 U/L Final    ALT (SGPT) 05/01/2025 14  1 - 33 U/L Final    Total Cholesterol 05/01/2025 171  0 - 200 mg/dL Final    Comment: Cholesterol Reference Ranges  (U.S. Department of Health and Human Services ATP III  Classifications)  Desirable          <200 mg/dL  Borderline High    200-239 mg/dL  High Risk          >240 mg/dL  Triglyceride Reference Ranges  (U.S. Department of Health and Human Services ATP III  Classifications)  Normal           <150 mg/dL  Borderline High  150-199 mg/dL  High             200-499 mg/dL  Very High        >500 mg/dL  HDL Reference Ranges  (U.S. Department of Health and Human Services ATP III  Classifications)  Low     <40 mg/dl (major risk factor for CHD)  High    >60 mg/dl ('negative' risk factor for CHD)  LDL Reference Ranges  (U.S. Department of Health and Human Services ATP III  Classifications)  Optimal          <100 mg/dL  Near Optimal     100-129 mg/dL  Borderline High  130-159 mg/dL  High             160-189 mg/dL  Very High        >189 mg/dL  LDL is calculated using the NIH LDL-C calculation.      Triglycerides 05/01/2025 75  0 - 150 mg/dL Final    HDL Cholesterol 05/01/2025 90 (H)  40 - 60 mg/dL Final    VLDL Cholesterol [place] 05/01/2025 14  5 - 40 mg/dL Final    LDL Chol Calc (NIH) 05/01/2025 67  0 - 100 mg/dL Final    Chol/HDL Ratio  05/01/2025 1.90   Final    TSH 05/01/2025 0.976  0.270 - 4.200 uIU/mL Final    25 Hydroxy, Vitamin D 05/01/2025 52.4  30.0 - 100.0 ng/ml Final    Comment: Reference Range for Total Vitamin D 25(OH)  Deficiency <20.0 ng/mL  Insufficiency 21-29 ng/mL  Sufficiency  ng/mL  Toxicity >100 ng/ml      Hemoglobin A1C 05/01/2025 5.40  4.80 - 5.60 % Final    Comment: Hemoglobin A1C Ranges:  Increased Risk for Diabetes  5.7% to 6.4%  Diabetes                     >= 6.5%  Diabetic Goal                < 7.0%          1. HTN- well managed   2. Hypothyroidism- continue levo at 75 mcg daily   3. Frequent utis- improved, discussed vaginal estrogen - she prefers to wait on this, continue cranberry supp   4. HPL-continue lipitor 20 mg daily     Discussed vaccines

## 2025-05-27 RX ORDER — LISINOPRIL 40 MG/1
40 TABLET ORAL DAILY
Qty: 90 TABLET | Refills: 0 | Status: SHIPPED | OUTPATIENT
Start: 2025-05-27

## 2025-07-07 ENCOUNTER — OFFICE VISIT (OUTPATIENT)
Age: 89
End: 2025-07-07
Payer: MEDICARE

## 2025-07-07 VITALS
SYSTOLIC BLOOD PRESSURE: 130 MMHG | BODY MASS INDEX: 28.35 KG/M2 | HEIGHT: 63 IN | OXYGEN SATURATION: 96 % | WEIGHT: 160 LBS | DIASTOLIC BLOOD PRESSURE: 78 MMHG

## 2025-07-07 DIAGNOSIS — I10 ESSENTIAL HYPERTENSION: Primary | ICD-10-CM

## 2025-07-07 DIAGNOSIS — E78.5 HYPERLIPIDEMIA, UNSPECIFIED HYPERLIPIDEMIA TYPE: ICD-10-CM

## 2025-07-07 DIAGNOSIS — I49.3 PVC'S (PREMATURE VENTRICULAR CONTRACTIONS): ICD-10-CM

## 2025-07-07 PROCEDURE — 1160F RVW MEDS BY RX/DR IN RCRD: CPT | Performed by: PHYSICIAN ASSISTANT

## 2025-07-07 PROCEDURE — 1159F MED LIST DOCD IN RCRD: CPT | Performed by: PHYSICIAN ASSISTANT

## 2025-07-07 PROCEDURE — 99214 OFFICE O/P EST MOD 30 MIN: CPT | Performed by: PHYSICIAN ASSISTANT

## 2025-07-07 PROCEDURE — 93000 ELECTROCARDIOGRAM COMPLETE: CPT | Performed by: PHYSICIAN ASSISTANT

## 2025-07-07 NOTE — PROGRESS NOTES
CARDIOLOGY        Patient Name: Isabel Handy  :1935  Age: 89 y.o.  Primary Cardiologist: Jannette Mcmahon MD  Encounter Provider:  Kody Cote PA-C    Date of Service: 25            CHIEF COMPLAINT / REASON FOR OFFICE VISIT     1 year follow-up    HISTORY OF PRESENT ILLNESS       HPI  Isabel Handy is a 89 y.o. female who presents today for 1 year follow-up.     Pt has a  history significant for hypertension, hyperlipidemia, and PVCs presents for 1 year follow-up.  She was last seen on 2024 where she denied any palpitations, dizziness, syncope, falls, orthopnea, or PND.  She had no exertional dyspnea.  She was walking for exercise and still drives.  She had no changes and was to follow-up in 1 year.    Patient has been doing well over the past year.  She denies any chest pain, shortness of breath, palpitations, orthopnea, or lightheadedness or dizziness.  She mentions she is not exercising as much due to a foot and ankle injury.  She has gained some weight due to this.  She has some intermittent swelling to her ankles but relates it to being more sedentary and not watching her salt intake.      Below is a brief summary of patient's pertinent cardiac history  She woke with palpitations morning of 3/15/2016.  She was sent to the chest pain center.  Her troponin was negative. Her TSH was well-controlled.  She had a dobutamine stress echocardiogram which was done 3/16/2016 which showed no evidence of ischemia, baseline ejection fraction was 64%. She also had a Holter recording done 3/15/2016.  This showed 100 PVCs per hour.  There were rare PACs.     She is very strong family history of cardiac disease.  Both of her parents of myocardial infarctions and she's had 2 brothers with myocardial infarctions.     Echo on 2020 shows ejection fraction 66% with grade 1 diastolic dysfunction, aortic valve calcification without stenosis, mild tricuspid insufficiency.  Vascular  "screening, carotids only, done 5/24/2021 showed mild right carotid artery stenosis and plaque of the left carotid artery without stenosis.     She developed a blood clot in her left lower extremity 3/2023, treated with Eliquis.  Carotid duplex exam 4/25/2023 showed no evidence of carotid stenosis.  Echocardiogram done 4/25/2023 shows ejection action 60% with normal diastolic function, mild aortic valve calcification, mild mitral insufficiency, mild tricuspid insufficiency, normal RVSP.  She had a normal left lower extremity venous duplex study in 6/5/2023.      Labs in 6/3/2024 show normal BMP.  Labs in 4/25/2024 show sodium 135, creatinine 1.24, otherwise normal CMP, normal hemoglobin A1c, normal TSH, total cholesterol 182, HDL 86, LDL 80, triglycerides 91, VLDL 16.   CT angiogram of the head done 3/26/2024 showed no significant stenosis, no aneurysm.  CT head showed no acute abnormalities, no hydrocephalus mass effect or herniation.  CT venogram head done 3/26/2024 showed no venous sinus thrombosis.    The following portions of the patient's history were reviewed and updated as appropriate: allergies, current medications, past family history, past medical history, past social history, past surgical history and problem list.      VITAL SIGNS     Visit Vitals  /78 (BP Location: Left arm, Patient Position: Sitting, Cuff Size: Adult)   Ht 160 cm (63\")   Wt 72.6 kg (160 lb)   LMP  (LMP Unknown)   SpO2 96%   BMI 28.34 kg/m²       @[email or web address]  Wt Readings from Last 3 Encounters:   07/07/25 72.6 kg (160 lb)   06/11/25 74.8 kg (164 lb 14.5 oz)   05/30/25 74.8 kg (165 lb)     Body mass index is 28.34 kg/m².        PHYSICAL EXAMINATION     Constitutional:       General: Awake. Not in acute distress.     Appearance: Not in distress.   Pulmonary:      Effort: Pulmonary effort is normal.      Breath sounds: Normal breath sounds.   Cardiovascular:      Normal rate. Regular rhythm.      Murmurs: There is a " systolic murmur.   Edema:     Pretibial: bilateral 1+ edema of the pretibial area.  Skin:     General: Skin is warm.   Neurological:      Mental Status: Alert.   Psychiatric:         Behavior: Behavior is cooperative.           REVIEWED DATA       ECG 12 Lead    Date/Time: 7/7/2025 2:42 PM  Performed by: Kody Cote PA-C    Authorized by: Kody Cote PA-C  Comparison: compared with previous ECG   Similar to previous ECG  Rhythm: sinus rhythm  Rate: normal  BPM: 71  QRS axis: normal    Clinical impression: normal ECG          Cardiac Procedures:  Transthoracic echo on 4/25/2023      Left ventricular systolic function is normal. Calculated left ventricular EF = 60.1%    Left ventricular diastolic function was normal.    There is mild calcification of the aortic valve.    Estimated right ventricular systolic pressure from tricuspid regurgitation is normal (<35 mmHg). Calculated right ventricular systolic pressure from tricuspid regurgitation is 25 mmHg.     Ultrasound carotid bilaterally on 4/25/2023  IMPRESSION:  1. Mild to moderate atheromatous plaque in the carotid vessels as  detailed above.  2. No Doppler ultrasound evidence of hemodynamically significant carotid  stenosis.     This report was yesy  Lipid Panel          10/30/2024    09:27 5/1/2025    07:57   Lipid Panel   Total Cholesterol 187  171    Triglycerides 69  75    HDL Cholesterol 96  90    VLDL Cholesterol 13  14    LDL Cholesterol  78  67        Lab Results   Component Value Date     (L) 05/01/2025     10/30/2024    K 5.2 05/01/2025    K 4.3 10/30/2024    CL 97 (L) 05/01/2025     10/30/2024    CO2 25.8 05/01/2025    CO2 26.2 10/30/2024    BUN 9 05/01/2025    BUN 9 10/30/2024    CREATININE 0.86 05/01/2025    CREATININE 0.84 10/30/2024    EGFRIFNONA 57 (L) 10/28/2021    EGFRIFNONA 65 03/23/2021    EGFRIFAFRI 66 10/28/2021    EGFRIFAFRI 78 03/23/2021    GLUCOSE 101 (H) 05/01/2025    GLUCOSE 95 10/30/2024    CALCIUM  10.0 05/01/2025    CALCIUM 10.0 10/30/2024    ALBUMIN 4.3 05/01/2025    ALBUMIN 4.4 10/30/2024    BILITOT 0.7 05/01/2025    BILITOT 0.9 10/30/2024    AST 17 05/01/2025    AST 21 10/30/2024    ALT 14 05/01/2025    ALT 17 10/30/2024     Lab Results   Component Value Date    WBC 10.76 05/01/2025    WBC 16.95 (H) 10/30/2024    HGB 14.9 05/01/2025    HGB 15.7 10/30/2024    HCT 44.6 05/01/2025    HCT 48.3 (H) 10/30/2024    MCV 90.1 05/01/2025    MCV 91.0 10/30/2024     05/01/2025     10/30/2024     Lab Results   Component Value Date    PROBNP 175.9 09/06/2018    PROBNP 73.6 08/24/2018    BNP 53 01/23/2025     Lab Results   Component Value Date    CKTOTAL 96 09/07/2014    TROPONINT 11 03/27/2024     Lab Results   Component Value Date    TSH 0.976 05/01/2025    TSH 0.939 10/30/2024             ASSESSMENT & PLAN     Diagnoses and all orders for this visit:    1. Essential hypertension (Primary)    2. Hyperlipidemia, unspecified hyperlipidemia type    3. PVC's (premature ventricular contractions)      PVCs.  Well-controlled with exercise.  Hypertension, goal <120/80.  Controlled with lisinopril and amlodipine.  Bilateral carotid bruits, atheromatous disease of the carotids without stenosis.  Family history cardiovascular disease.    History of left lower extremity DVT in 2023 treated with eliquis.   History of elevated hemoglobin, stopped her estradiol due to this.  Aortic valve calcification-causing murmur.      Patient has been doing well over the past year.  Patient was encouraged to start riding stationary bike to help with her exercising.  Spoke to her about limitations of her salt intake to help her blood pressure and ankle swelling.  She otherwise is doing well.  I will have her follow-up with Dr. Mcmahon in 1 year.    Return in about 1 year (around 7/7/2026) for Dr. Mcmahon.    Future Appointments         Provider Department Center    11/12/2025 9:30 AM (Arrive by 9:15 AM) [facility]  [facility]    11/18/2025 11:00 AM (Arrive by 10:45 AM) Beth Figueredo APRN [facility]                MEDICATIONS         Discharge Medications            Accurate as of July 7, 2025  2:53 PM. If you have any questions, ask your nurse or doctor.                Continue These Medications        Instructions Start Date   acetaminophen 500 MG tablet  Commonly known as: TYLENOL   500 mg, Oral, Every 6 Hours PRN      aspirin 81 MG chewable tablet   81 mg, Daily      atorvastatin 20 MG tablet  Commonly known as: LIPITOR   20 mg, Oral, Nightly      cetirizine 5 MG tablet  Commonly known as: zyrTEC   5 mg, Oral, Daily      cetirizine 5 MG tablet  Commonly known as: zyrTEC   5 mg, Oral, Daily      CRANBERRY PO   Take  by mouth.      esomeprazole 40 MG capsule  Commonly known as: nexIUM   40 mg, Oral, Every Morning Before Breakfast      levothyroxine 75 MCG tablet  Commonly known as: SYNTHROID, LEVOTHROID   75 mcg, Oral, Daily      lisinopril 40 MG tablet  Commonly known as: PRINIVIL,ZESTRIL   40 mg, Oral, Daily, for blood pressure      multivitamin tablet tablet   Daily      VITAMIN D PO   Take  by mouth.                   **Dragon Disclaimer:   Much of this encounter note is an electronic transcription/translation of spoken language to printed text. The electronic translation of spoken language may permit erroneous, or at times, nonsensical words or phrases to be inadvertently transcribed. Although I have reviewed the note for such errors, some may still exist.

## 2025-07-14 RX ORDER — ATORVASTATIN CALCIUM 20 MG/1
20 TABLET, FILM COATED ORAL NIGHTLY
Qty: 90 TABLET | Refills: 1 | Status: SHIPPED | OUTPATIENT
Start: 2025-07-14

## 2025-08-17 ENCOUNTER — APPOINTMENT (OUTPATIENT)
Dept: CT IMAGING | Facility: HOSPITAL | Age: 89
End: 2025-08-17
Payer: MEDICARE

## 2025-08-17 ENCOUNTER — APPOINTMENT (OUTPATIENT)
Dept: ULTRASOUND IMAGING | Facility: HOSPITAL | Age: 89
End: 2025-08-17
Payer: MEDICARE

## 2025-08-17 ENCOUNTER — HOSPITAL ENCOUNTER (EMERGENCY)
Facility: HOSPITAL | Age: 89
Discharge: HOME | End: 2025-08-17
Attending: EMERGENCY MEDICINE | Admitting: EMERGENCY MEDICINE
Payer: MEDICARE

## 2025-08-17 ENCOUNTER — APPOINTMENT (OUTPATIENT)
Dept: GENERAL RADIOLOGY | Facility: HOSPITAL | Age: 89
End: 2025-08-17
Payer: MEDICARE

## 2025-08-17 VITALS
DIASTOLIC BLOOD PRESSURE: 58 MMHG | HEART RATE: 79 BPM | HEIGHT: 63 IN | OXYGEN SATURATION: 97 % | RESPIRATION RATE: 18 BRPM | TEMPERATURE: 98.3 F | BODY MASS INDEX: 28.35 KG/M2 | WEIGHT: 160 LBS | SYSTOLIC BLOOD PRESSURE: 123 MMHG

## 2025-08-17 DIAGNOSIS — R60.0 LEG EDEMA, RIGHT: ICD-10-CM

## 2025-08-17 DIAGNOSIS — M25.561 ACUTE PAIN OF RIGHT KNEE: Primary | ICD-10-CM

## 2025-08-17 PROCEDURE — 99284 EMERGENCY DEPT VISIT MOD MDM: CPT | Performed by: EMERGENCY MEDICINE

## 2025-08-17 PROCEDURE — 93971 EXTREMITY STUDY: CPT

## 2025-08-17 PROCEDURE — 73700 CT LOWER EXTREMITY W/O DYE: CPT

## 2025-08-17 PROCEDURE — 73562 X-RAY EXAM OF KNEE 3: CPT

## (undated) DEVICE — THE TORRENT IRRIGATION SCOPE CONNECTOR IS USED WITH THE TORRENT IRRIGATION TUBING TO PROVIDE IRRIGATION FLUIDS SUCH AS STERILE WATER DURING GASTROINTESTINAL ENDOSCOPIC PROCEDURES WHEN USED IN CONJUNCTION WITH AN IRRIGATION PUMP (OR ELECTROSURGICAL UNIT).: Brand: TORRENT

## (undated) DEVICE — CANN O2 ETCO2 FITS ALL CONN CO2 SMPL A/ 7IN DISP LF

## (undated) DEVICE — CANN NASL CO2 TRULINK W/O2 A/

## (undated) DEVICE — ADAPT CLN BIOGUARD AIR/H2O DISP

## (undated) DEVICE — BITEBLOCK OMNI BLOC

## (undated) DEVICE — TBG SXN CONN/F UNIV 1/4IN 10FT LF STRL

## (undated) DEVICE — KT ORCA ORCAPOD DISP STRL

## (undated) DEVICE — Device: Brand: DEFENDO AIR/WATER/SUCTION AND BIOPSY VALVE

## (undated) DEVICE — LN SMPL CO2 SHTRM SD STREAM W/M LUER

## (undated) DEVICE — SINGLE-USE BIOPSY FORCEPS: Brand: RADIAL JAW 4

## (undated) DEVICE — TUBING, SUCTION, 1/4" X 10', STRAIGHT: Brand: MEDLINE

## (undated) DEVICE — SENSR O2 OXIMAX FNGR A/ 18IN NONSTR